# Patient Record
Sex: MALE | Race: WHITE | NOT HISPANIC OR LATINO | Employment: PART TIME | URBAN - METROPOLITAN AREA
[De-identification: names, ages, dates, MRNs, and addresses within clinical notes are randomized per-mention and may not be internally consistent; named-entity substitution may affect disease eponyms.]

---

## 2020-07-07 ENCOUNTER — HISTORICAL (OUTPATIENT)
Dept: LAB | Facility: HOSPITAL | Age: 55
End: 2020-07-07

## 2020-07-07 LAB — SARS-COV-2 RNA RESP QL NAA+PROBE: NOT DETECTED

## 2020-12-01 ENCOUNTER — HISTORICAL (OUTPATIENT)
Dept: ADMINISTRATIVE | Facility: HOSPITAL | Age: 55
End: 2020-12-01

## 2020-12-23 LAB
ABS NEUT (OLG): 4.56 X10(3)/MCL (ref 2.1–9.2)
BUN SERPL-MCNC: 22.6 MG/DL (ref 8.4–25.7)
CALCIUM SERPL-MCNC: 9.1 MG/DL (ref 8.4–10.2)
CHLORIDE SERPL-SCNC: 110 MMOL/L (ref 98–107)
CO2 SERPL-SCNC: 22 MMOL/L (ref 22–29)
CREAT SERPL-MCNC: 1.94 MG/DL (ref 0.72–1.25)
CREAT/UREA NIT SERPL: 12
ERYTHROCYTE [DISTWIDTH] IN BLOOD BY AUTOMATED COUNT: 13.6 % (ref 11.5–17)
GLUCOSE SERPL-MCNC: 118 MG/DL (ref 74–100)
HCT VFR BLD AUTO: 40 % (ref 42–52)
HGB BLD-MCNC: 12.5 GM/DL (ref 14–18)
MCH RBC QN AUTO: 26.3 PG (ref 27–31)
MCHC RBC AUTO-ENTMCNC: 31.3 GM/DL (ref 33–36)
MCV RBC AUTO: 84.2 FL (ref 80–94)
NRBC BLD AUTO-RTO: 0 % (ref 0–0.2)
PLATELET # BLD AUTO: 260 X10(3)/MCL (ref 130–400)
PMV BLD AUTO: 11.3 FL (ref 7.4–10.4)
POTASSIUM SERPL-SCNC: 3.8 MMOL/L (ref 3.5–5.1)
RBC # BLD AUTO: 4.75 X10(6)/MCL (ref 4.7–6.1)
SARS-COV-2 RNA RESP QL NAA+PROBE: NOT DETECTED
SODIUM SERPL-SCNC: 142 MMOL/L (ref 136–145)
WBC # SPEC AUTO: 6.7 X10(3)/MCL (ref 4.5–11.5)

## 2020-12-28 ENCOUNTER — HISTORICAL (OUTPATIENT)
Dept: SURGERY | Facility: HOSPITAL | Age: 55
End: 2020-12-28

## 2021-03-19 ENCOUNTER — HISTORICAL (OUTPATIENT)
Dept: RADIOLOGY | Facility: HOSPITAL | Age: 56
End: 2021-03-19

## 2021-03-19 LAB
ABS NEUT (OLG): 4.8 X10(3)/MCL (ref 2.1–9.2)
ALBUMIN SERPL-MCNC: 3.9 GM/DL (ref 3.5–5)
ALBUMIN/GLOB SERPL: 1.2 RATIO (ref 1.1–2)
ALP SERPL-CCNC: 74 UNIT/L (ref 40–150)
ALT SERPL-CCNC: 34 UNIT/L (ref 0–55)
APPEARANCE, UA: CLEAR
AST SERPL-CCNC: 37 UNIT/L (ref 5–34)
BACTERIA SPEC CULT: ABNORMAL /HPF
BILIRUB SERPL-MCNC: 0.5 MG/DL (ref 0.2–1.2)
BILIRUB UR QL STRIP: NEGATIVE
BILIRUBIN DIRECT+TOT PNL SERPL-MCNC: 0.2 MG/DL (ref 0–0.5)
BILIRUBIN DIRECT+TOT PNL SERPL-MCNC: 0.3 MG/DL (ref 0–0.8)
BUN SERPL-MCNC: 44.9 MG/DL (ref 8.4–25.7)
CALCIUM SERPL-MCNC: 9.8 MG/DL (ref 8.4–10.2)
CHLORIDE SERPL-SCNC: 106 MMOL/L (ref 98–107)
CO2 SERPL-SCNC: 25 MMOL/L (ref 22–29)
COLOR UR: YELLOW
CREAT SERPL-MCNC: 2.71 MG/DL (ref 0.72–1.25)
CREAT UR-MCNC: 56.2 MG/DL (ref 58–161)
ERYTHROCYTE [DISTWIDTH] IN BLOOD BY AUTOMATED COUNT: 15.3 % (ref 11.5–17)
GLOBULIN SER-MCNC: 3.3 GM/DL (ref 2.4–3.5)
GLUCOSE (UA): ABNORMAL
GLUCOSE SERPL-MCNC: 122 MG/DL (ref 74–100)
HBV SURFACE AG SERPL QL IA: NONREACTIVE
HCT VFR BLD AUTO: 40 % (ref 42–52)
HCV AB SERPL QL IA: REACTIVE
HGB BLD-MCNC: 12.6 GM/DL (ref 14–18)
HGB UR QL STRIP: NEGATIVE
KETONES UR QL STRIP: NEGATIVE
LEUKOCYTE ESTERASE UR QL STRIP: NEGATIVE
MCH RBC QN AUTO: 26.6 PG (ref 27–31)
MCHC RBC AUTO-ENTMCNC: 31.5 GM/DL (ref 33–36)
MCV RBC AUTO: 84.4 FL (ref 80–94)
NITRITE UR QL STRIP: NEGATIVE
NRBC BLD AUTO-RTO: 0 % (ref 0–0.2)
PH UR STRIP: 7 [PH] (ref 5–7)
PHOSPHATE SERPL-MCNC: 3.8 MG/DL (ref 2.3–4.7)
PLATELET # BLD AUTO: 287 X10(3)/MCL (ref 130–400)
PMV BLD AUTO: 10.1 FL (ref 7.4–10.4)
POTASSIUM SERPL-SCNC: 4.8 MMOL/L (ref 3.5–5.1)
PROT SERPL-MCNC: 6.8 GM/DL
PROT SERPL-MCNC: 7.2 GM/DL (ref 6.4–8.3)
PROT UR QL STRIP: ABNORMAL
PROT UR STRIP-MCNC: 420.9 MG/DL
PROT/CREAT UR-RTO: 7489.3 MG/GM CR
PTH-INTACT SERPL-MCNC: 66.6 PG/ML (ref 8.7–77.1)
RBC # BLD AUTO: 4.74 X10(6)/MCL (ref 4.7–6.1)
RBC #/AREA URNS HPF: 0 /[HPF]
SODIUM SERPL-SCNC: 138 MMOL/L (ref 136–145)
SP GR UR STRIP: 1.02 (ref 1–1.03)
SQUAMOUS EPITHELIAL, UA: ABNORMAL /LPF
UROBILINOGEN UR STRIP-ACNC: NEGATIVE
WBC # SPEC AUTO: 7 X10(3)/MCL (ref 4.5–11.5)
WBC #/AREA URNS HPF: ABNORMAL /HPF

## 2021-04-19 ENCOUNTER — HISTORICAL (OUTPATIENT)
Dept: ADMINISTRATIVE | Facility: HOSPITAL | Age: 56
End: 2021-04-19

## 2021-04-30 ENCOUNTER — HISTORICAL (OUTPATIENT)
Dept: RADIOLOGY | Facility: HOSPITAL | Age: 56
End: 2021-04-30

## 2021-07-08 ENCOUNTER — HISTORICAL (OUTPATIENT)
Dept: RADIOLOGY | Facility: HOSPITAL | Age: 56
End: 2021-07-08

## 2021-07-21 ENCOUNTER — HISTORICAL (OUTPATIENT)
Dept: ADMINISTRATIVE | Facility: HOSPITAL | Age: 56
End: 2021-07-21

## 2021-07-21 LAB
ABS NEUT (OLG): 4.89 X10(3)/MCL (ref 2.1–9.2)
ALBUMIN SERPL-MCNC: 2.9 GM/DL (ref 3.5–5)
ALBUMIN/GLOB SERPL: 0.9 RATIO (ref 1.1–2)
ALP SERPL-CCNC: 72 UNIT/L (ref 40–150)
ALT SERPL-CCNC: 40 UNIT/L (ref 0–55)
APPEARANCE, UA: CLEAR
AST SERPL-CCNC: 39 UNIT/L (ref 5–34)
BACTERIA SPEC CULT: ABNORMAL /HPF
BASOPHILS # BLD AUTO: 0.1 X10(3)/MCL (ref 0–0.2)
BASOPHILS NFR BLD AUTO: 1 %
BILIRUB SERPL-MCNC: 0.4 MG/DL
BILIRUB UR QL STRIP: NEGATIVE
BILIRUBIN DIRECT+TOT PNL SERPL-MCNC: 0.2 MG/DL (ref 0–0.5)
BILIRUBIN DIRECT+TOT PNL SERPL-MCNC: 0.2 MG/DL (ref 0–0.8)
BUN SERPL-MCNC: 18.8 MG/DL (ref 8.4–25.7)
CALCIUM SERPL-MCNC: 7.4 MG/DL (ref 8.4–10.2)
CHLORIDE SERPL-SCNC: 107 MMOL/L (ref 98–107)
CHOLEST SERPL-MCNC: 184 MG/DL
CHOLEST/HDLC SERPL: 5 {RATIO} (ref 0–5)
CO2 SERPL-SCNC: 24 MMOL/L (ref 22–29)
COLOR UR: YELLOW
CREAT SERPL-MCNC: 2.32 MG/DL (ref 0.73–1.18)
CREAT UR-MCNC: 24.9 MG/DL (ref 58–161)
EOSINOPHIL # BLD AUTO: 0.3 X10(3)/MCL (ref 0–0.9)
EOSINOPHIL NFR BLD AUTO: 4 %
ERYTHROCYTE [DISTWIDTH] IN BLOOD BY AUTOMATED COUNT: 13.3 % (ref 11.5–17)
GLOBULIN SER-MCNC: 3.4 GM/DL (ref 2.4–3.5)
GLUCOSE (UA): ABNORMAL
GLUCOSE SERPL-MCNC: 237 MG/DL (ref 74–100)
HCT VFR BLD AUTO: 38.4 % (ref 42–52)
HDLC SERPL-MCNC: 36 MG/DL (ref 35–60)
HGB BLD-MCNC: 12.2 GM/DL (ref 14–18)
HGB UR QL STRIP: ABNORMAL
KETONES UR QL STRIP: NEGATIVE
LDLC SERPL CALC-MCNC: 75 MG/DL (ref 50–140)
LEUKOCYTE ESTERASE UR QL STRIP: NEGATIVE
LYMPHOCYTES # BLD AUTO: 1.1 X10(3)/MCL (ref 0.6–4.6)
LYMPHOCYTES NFR BLD AUTO: 16 %
MCH RBC QN AUTO: 27.5 PG (ref 27–31)
MCHC RBC AUTO-ENTMCNC: 31.8 GM/DL (ref 33–36)
MCV RBC AUTO: 86.5 FL (ref 80–94)
MICROALBUMIN UR-MCNC: >500 UG/ML
MICROALBUMIN/CREAT RATIO PNL UR: >2008 MG/GM CR (ref 0–30)
MONOCYTES # BLD AUTO: 0.5 X10(3)/MCL (ref 0.1–1.3)
MONOCYTES NFR BLD AUTO: 7 %
NEUTROPHILS # BLD AUTO: 4.89 X10(3)/MCL (ref 2.1–9.2)
NEUTROPHILS NFR BLD AUTO: 71 %
NITRITE UR QL STRIP: NEGATIVE
PH UR STRIP: 7.5 [PH] (ref 5–9)
PLATELET # BLD AUTO: 250 X10(3)/MCL (ref 130–400)
PMV BLD AUTO: 11.5 FL (ref 9.4–12.4)
POTASSIUM SERPL-SCNC: 5.5 MMOL/L (ref 3.5–5.1)
PROT SERPL-MCNC: 6.3 GM/DL (ref 6.4–8.3)
PROT UR QL STRIP: ABNORMAL
RBC # BLD AUTO: 4.44 X10(6)/MCL (ref 4.7–6.1)
RBC #/AREA URNS HPF: ABNORMAL /[HPF]
SODIUM SERPL-SCNC: 143 MMOL/L (ref 136–145)
SP GR UR STRIP: 1.01 (ref 1–1.03)
SQUAMOUS EPITHELIAL, UA: ABNORMAL /HPF (ref 0–4)
TRIGL SERPL-MCNC: 364 MG/DL (ref 34–140)
UROBILINOGEN UR STRIP-ACNC: 0.2
VLDLC SERPL CALC-MCNC: 73 MG/DL
WBC # SPEC AUTO: 6.9 X10(3)/MCL (ref 4.5–11.5)
WBC #/AREA URNS HPF: ABNORMAL /HPF

## 2021-09-03 ENCOUNTER — HISTORICAL (OUTPATIENT)
Dept: LAB | Facility: HOSPITAL | Age: 56
End: 2021-09-03

## 2021-09-03 LAB
ABS NEUT (OLG): 4.25 X10(3)/MCL (ref 2.1–9.2)
ALBUMIN SERPL-MCNC: 3.2 GM/DL (ref 3.5–5)
ALBUMIN/GLOB SERPL: 0.9 RATIO (ref 1.1–2)
ALP SERPL-CCNC: 61 UNIT/L (ref 40–150)
ALT SERPL-CCNC: 39 UNIT/L (ref 0–55)
APPEARANCE, UA: CLEAR
AST SERPL-CCNC: 40 UNIT/L (ref 5–34)
BACTERIA SPEC CULT: ABNORMAL
BASOPHILS # BLD AUTO: 0.11 X10(3)/MCL (ref 0–0.2)
BASOPHILS NFR BLD AUTO: 1.6 % (ref 0–0.9)
BILIRUB SERPL-MCNC: 0.4 MG/DL (ref 0.2–1.2)
BILIRUB UR QL STRIP: NEGATIVE
BILIRUBIN DIRECT+TOT PNL SERPL-MCNC: 0.1 MG/DL (ref 0–0.5)
BILIRUBIN DIRECT+TOT PNL SERPL-MCNC: 0.3 MG/DL (ref 0–0.8)
BUN SERPL-MCNC: 20.1 MG/DL (ref 8.4–25.7)
CALCIUM SERPL-MCNC: 8.9 MG/DL (ref 8.4–10.2)
CHLORIDE SERPL-SCNC: 109 MMOL/L (ref 98–107)
CHOLEST SERPL-MCNC: 195 MG/DL
CHOLEST/HDLC SERPL: 6 {RATIO} (ref 0–5)
CO2 SERPL-SCNC: 24 MMOL/L (ref 22–29)
COLOR UR: YELLOW
CREAT SERPL-MCNC: 2.79 MG/DL (ref 0.72–1.25)
CREAT UR-MCNC: 58.8 MG/DL (ref 58–161)
EOSINOPHIL # BLD AUTO: 0.31 X10(3)/MCL (ref 0–0.9)
EOSINOPHIL NFR BLD AUTO: 4.6 % (ref 0–6.5)
ERYTHROCYTE [DISTWIDTH] IN BLOOD BY AUTOMATED COUNT: 13.8 % (ref 11.5–17)
EST. AVERAGE GLUCOSE BLD GHB EST-MCNC: 214.5 MG/DL
GLOBULIN SER-MCNC: 3.5 GM/DL (ref 2.4–3.5)
GLUCOSE (UA): ABNORMAL
GLUCOSE SERPL-MCNC: 84 MG/DL (ref 74–100)
HBA1C MFR BLD: 9.1 %
HCT VFR BLD AUTO: 38.4 % (ref 42–52)
HDLC SERPL-MCNC: 33 MG/DL (ref 40–60)
HGB BLD-MCNC: 12.3 GM/DL (ref 14–18)
HGB UR QL STRIP: ABNORMAL
IMM GRANULOCYTES # BLD AUTO: 0.04 10*3/UL (ref 0–0.02)
IMM GRANULOCYTES NFR BLD AUTO: 0.6 % (ref 0–0.43)
KETONES UR QL STRIP: NEGATIVE
LDLC SERPL CALC-MCNC: 107 MG/DL (ref 50–140)
LEUKOCYTE ESTERASE UR QL STRIP: NEGATIVE
LYMPHOCYTES # BLD AUTO: 1.57 X10(3)/MCL (ref 0.6–4.6)
LYMPHOCYTES NFR BLD AUTO: 23.1 % (ref 16.2–38.3)
MCH RBC QN AUTO: 28.1 PG (ref 27–31)
MCHC RBC AUTO-ENTMCNC: 32 GM/DL (ref 33–36)
MCV RBC AUTO: 87.7 FL (ref 80–94)
MONOCYTES # BLD AUTO: 0.51 X10(3)/MCL (ref 0.1–1.3)
MONOCYTES NFR BLD AUTO: 7.5 % (ref 4.7–11.3)
NEUTROPHILS # BLD AUTO: 4.25 X10(3)/MCL (ref 2.1–9.2)
NEUTROPHILS NFR BLD AUTO: 62.6 % (ref 49.1–73.4)
NITRITE UR QL STRIP: NEGATIVE
NRBC BLD AUTO-RTO: 0 % (ref 0–0.2)
PH UR STRIP: 7 [PH] (ref 5–7)
PHOSPHATE SERPL-MCNC: 3.6 MG/DL (ref 2.3–4.7)
PLATELET # BLD AUTO: 283 X10(3)/MCL (ref 130–400)
PMV BLD AUTO: 10.4 FL (ref 7.4–10.4)
POTASSIUM SERPL-SCNC: 4.2 MMOL/L (ref 3.5–5.1)
PROT SERPL-MCNC: 6.7 GM/DL (ref 6.4–8.3)
PROT UR QL STRIP: ABNORMAL
PROT UR STRIP-MCNC: 701.6 MG/DL
PSA SERPL-MCNC: 0.69 NG/ML
PTH-INTACT SERPL-MCNC: 133 PG/ML (ref 8.7–77.1)
RBC # BLD AUTO: 4.38 X10(6)/MCL (ref 4.7–6.1)
RBC #/AREA URNS HPF: ABNORMAL /HPF
SODIUM SERPL-SCNC: 142 MMOL/L (ref 136–145)
SP GR UR STRIP: 1.02 (ref 1–1.03)
SQUAMOUS EPITHELIAL, UA: ABNORMAL /LPF
TRIGL SERPL-MCNC: 276 MG/DL (ref 0–150)
UROBILINOGEN UR STRIP-ACNC: NEGATIVE
VLDLC SERPL CALC-MCNC: 55 MG/DL
WBC # SPEC AUTO: 6.8 X10(3)/MCL (ref 4.5–11.5)
WBC #/AREA URNS HPF: ABNORMAL /HPF

## 2021-11-09 ENCOUNTER — HISTORICAL (OUTPATIENT)
Dept: ADMINISTRATIVE | Facility: HOSPITAL | Age: 56
End: 2021-11-09

## 2021-11-09 LAB
ABS NEUT (OLG): 5.47 X10(3)/MCL (ref 2.1–9.2)
BASOPHILS # BLD AUTO: 0.1 X10(3)/MCL (ref 0–0.2)
BASOPHILS NFR BLD AUTO: 1 %
BUN SERPL-MCNC: 41.3 MG/DL (ref 8.4–25.7)
CALCIUM SERPL-MCNC: 9.5 MG/DL (ref 8.7–10.5)
CHLORIDE SERPL-SCNC: 101 MMOL/L (ref 98–107)
CO2 SERPL-SCNC: 23 MMOL/L (ref 22–29)
CREAT SERPL-MCNC: 3.91 MG/DL (ref 0.73–1.18)
CREAT/UREA NIT SERPL: 11
EOSINOPHIL # BLD AUTO: 0.3 X10(3)/MCL (ref 0–0.9)
EOSINOPHIL NFR BLD AUTO: 4 %
ERYTHROCYTE [DISTWIDTH] IN BLOOD BY AUTOMATED COUNT: 12.2 % (ref 11.5–17)
GLUCOSE SERPL-MCNC: 407 MG/DL (ref 74–100)
HCT VFR BLD AUTO: 38.3 % (ref 42–52)
HGB BLD-MCNC: 12.7 GM/DL (ref 14–18)
LYMPHOCYTES # BLD AUTO: 1.6 X10(3)/MCL (ref 0.6–4.6)
LYMPHOCYTES NFR BLD AUTO: 20 %
MCH RBC QN AUTO: 28.7 PG (ref 27–31)
MCHC RBC AUTO-ENTMCNC: 33.2 GM/DL (ref 33–36)
MCV RBC AUTO: 86.7 FL (ref 80–94)
MONOCYTES # BLD AUTO: 0.7 X10(3)/MCL (ref 0.1–1.3)
MONOCYTES NFR BLD AUTO: 8 %
NEUTROPHILS # BLD AUTO: 5.47 X10(3)/MCL (ref 2.1–9.2)
NEUTROPHILS NFR BLD AUTO: 66 %
PLATELET # BLD AUTO: 304 X10(3)/MCL (ref 130–400)
PMV BLD AUTO: 11.5 FL (ref 9.4–12.4)
POTASSIUM SERPL-SCNC: 5.6 MMOL/L (ref 3.5–5.1)
RBC # BLD AUTO: 4.42 X10(6)/MCL (ref 4.7–6.1)
SODIUM SERPL-SCNC: 134 MMOL/L (ref 136–145)
WBC # SPEC AUTO: 8.3 X10(3)/MCL (ref 4.5–11.5)

## 2022-01-04 ENCOUNTER — HISTORICAL (OUTPATIENT)
Dept: RADIOLOGY | Facility: HOSPITAL | Age: 57
End: 2022-01-04

## 2022-01-20 ENCOUNTER — HISTORICAL (OUTPATIENT)
Dept: RADIOLOGY | Facility: HOSPITAL | Age: 57
End: 2022-01-20

## 2022-02-25 ENCOUNTER — HISTORICAL (OUTPATIENT)
Dept: ADMINISTRATIVE | Facility: HOSPITAL | Age: 57
End: 2022-02-25

## 2022-02-25 LAB — DEPRECATED CALCIDIOL+CALCIFEROL SERPL-MC: 32.3 NG/ML (ref 30–80)

## 2022-03-21 ENCOUNTER — HISTORICAL (OUTPATIENT)
Dept: ADMINISTRATIVE | Facility: HOSPITAL | Age: 57
End: 2022-03-21

## 2022-03-21 LAB
ABS NEUT (OLG): 6.85 (ref 2.1–9.2)
BASOPHILS # BLD AUTO: 0.1 10*3/UL (ref 0–0.2)
BASOPHILS NFR BLD AUTO: 1 %
EOSINOPHIL # BLD AUTO: 0.4 10*3/UL (ref 0–0.9)
EOSINOPHIL NFR BLD AUTO: 4 %
ERYTHROCYTE [DISTWIDTH] IN BLOOD BY AUTOMATED COUNT: 17.7 % (ref 11.5–17)
HCT VFR BLD AUTO: 27.7 % (ref 42–52)
HGB BLD-MCNC: 8.8 G/DL (ref 14–18)
LYMPHOCYTES # BLD AUTO: 1 10*3/UL (ref 0.6–4.6)
LYMPHOCYTES NFR BLD AUTO: 11 %
MANUAL DIFF? (OHS): NO
MCH RBC QN AUTO: 27.4 PG (ref 27–31)
MCHC RBC AUTO-ENTMCNC: 31.8 G/DL (ref 33–36)
MCV RBC AUTO: 86.3 FL (ref 80–94)
MONOCYTES # BLD AUTO: 0.8 10*3/UL (ref 0.1–1.3)
MONOCYTES NFR BLD AUTO: 9 %
NEUTROPHILS # BLD AUTO: 6.85 10*3/UL (ref 2.1–9.2)
NEUTROPHILS NFR BLD AUTO: 75 %
PLATELET # BLD AUTO: 342 10*3/UL (ref 130–400)
PMV BLD AUTO: 10.6 FL (ref 9.4–12.4)
RBC # BLD AUTO: 3.21 10*6/UL (ref 4.7–6.1)
WBC # SPEC AUTO: 9.2 10*3/UL (ref 4.5–11.5)

## 2022-04-10 ENCOUNTER — HISTORICAL (OUTPATIENT)
Dept: ADMINISTRATIVE | Facility: HOSPITAL | Age: 57
End: 2022-04-10
Payer: COMMERCIAL

## 2022-04-26 ENCOUNTER — HISTORICAL (OUTPATIENT)
Dept: ADMINISTRATIVE | Facility: HOSPITAL | Age: 57
End: 2022-04-26
Payer: COMMERCIAL

## 2022-04-26 VITALS
WEIGHT: 241.63 LBS | SYSTOLIC BLOOD PRESSURE: 140 MMHG | OXYGEN SATURATION: 97 % | HEIGHT: 69 IN | BODY MASS INDEX: 35.79 KG/M2 | DIASTOLIC BLOOD PRESSURE: 62 MMHG

## 2022-04-26 DIAGNOSIS — D64.9 ANEMIA, UNSPECIFIED TYPE: ICD-10-CM

## 2022-04-26 DIAGNOSIS — N18.4 CKD (CHRONIC KIDNEY DISEASE) STAGE 4, GFR 15-29 ML/MIN: Primary | ICD-10-CM

## 2022-04-26 DIAGNOSIS — I10 HYPERTENSION, UNSPECIFIED TYPE: ICD-10-CM

## 2022-04-26 LAB
FERRITIN SERPL-MCNC: 47.7 NG/ML (ref 21.81–274.66)
FOLATE SERPL-MCNC: 16.8 NG/ML (ref 7–31.4)
IRON SATN MFR SERPL: 48 % (ref 20–50)
IRON SERPL-MCNC: 170 UG/DL (ref 65–175)
TIBC SERPL-MCNC: 183 UG/DL (ref 69–240)
TIBC SERPL-MCNC: 353 UG/DL (ref 250–450)
TRANSFERRIN SERPL-MCNC: 321 MG/DL (ref 174–364)
VIT B12 SERPL-MCNC: 375 PG/ML (ref 213–816)

## 2022-04-28 ENCOUNTER — HISTORICAL (OUTPATIENT)
Dept: INFUSION THERAPY | Facility: HOSPITAL | Age: 57
End: 2022-04-28
Payer: COMMERCIAL

## 2022-05-03 NOTE — HISTORICAL [facility] CERNER
This is a historical note converted from [employer]. Formatting and pictures may have been removed.  Please reference [employer] for original formatting and attached multimedia. Chief Complaint  Pt here for bilateral hand pain x 6 months. Pt states having some numbness, itching, and tingling in hands. He reports having carpal tunnel sx in both hands about 20 years ago....NG  History of Present Illness  This is a patient that present today with wrist and hand pain. Patient also complains of numbness and tingling. Patient has been dealing with this pain for several weeks. The pain is moderate to severe. ?He did treat himself with wrist braces?and exercises.? He had?carpal tunnel surgery done 20+ years ago.  Review of Systems  All review of systems negative except for those stated in the HPI  Physical Exam  Vitals & Measurements  T:?98.1? ?F (Oral)? HR:?72(Peripheral)? BP:?157/70?  HT:?176.00?cm? WT:?107.950?kg? BMI:?34.85?  General: Well-developed, well-nourished.  Neuro: Alert and oriented x 3.  Psych: Normal mood and affect.  CV: Palpable radial pulses.  Resp: Smooth and unlabored.  Skin: No evidence of focal lesions or trauma.  Hem/Imm/Lymph: No evidence of lymphangitis or adenopathy.  Gait: No trendelenburg gait.  DTR: 2+, no hypo or hyperreflexia.  Coordination and Balance: No tremors or abnormal station.?  Bilateral?wrist Exam:  No obvious deformity. Negative tenderness over distal radius. Supination and pronation to 90 degrees and 90 degrees, respectively. Wrist flexion to 90 degrees and wrist extension to 70 degree. ?Positive flexion-compression test and Phanel?s test. Negative Finkelstein?s test. 4/5 strength, normal skin appearance and palpable pulses and CR<2.  Assessment/Plan  1.?Bilateral carpal tunnel syndrome?G56.03  ?We will send patient to neurologist to get a EMG/NCS test of both upper extremities.? I also placed him on gabapentin.  Ordered:  gabapentin, 300 mg = 1 cap(s), Oral, BID, # 60 tab(s), 0  Refill(s), Pharmacy: [id number], 176, cm, Height/Length Dosing, 12/01/20 12:56:00 CST, 107.95, kg, Weight Dosing, 12/01/20 12:56:00 CST  Office/Outpatient Visit Level 3 [facility] [id number] PC, Bilateral carpal tunnel syndrome, [facility], 12/01/20 13:01:00 CST  ?   Problem List/Past Medical History  Ongoing  Acid reflux  Cigarette smoker  Diabetes mellitus type 2  High cholesterol  Hypertension  Recurrent sinus infections  Historical  CVA (cerebral vascular accident)  Degenerative disc disease, lumbar  Diabetes mellitus  GERD (gastroesophageal reflux disease)  H/O: TIA  Lumbar stenosis  Numbness and tingling  Pain in back  Sleep apnea  Tinnitus  Procedure/Surgical History  Excision of intervertebral disc (04/16/2015)  Lumbar Laminectomy (.) (04/16/2015)  Other exploration and decompression of spinal canal (04/16/2015)  Ablation, soft tissue of inferior turbinates, unilateral or bilateral, any method (eg, electrocautery, radiofrequency ablation, or tissue volume reduction); intramural (ie, submucosal). (07/18/2012)  Adenoidectomy without tonsillectomy (07/18/2012)  Adenoidectomy, primary; age 12 or over. (07/18/2012)  Fracture nasal inferior turbinate(s), therapeutic. (07/18/2012)  Fracture of the turbinates (07/18/2012)  Nasal/sinus endoscopy, surgical; with ian bullosa resection. (07/18/2012)  Nasal/sinus endoscopy, surgical; with dilation of frontal sinus ostium (eg, balloon dilation). (07/18/2012)  Nasal/sinus endoscopy, surgical; with dilation of maxillary sinus ostium (eg, balloon dilation), transnasal or via canine fossa. (07/18/2012)  Other operations on nasal sinuses (07/18/2012)  Other operations on nasal sinuses (07/18/2012)  Other septoplasty (07/18/2012)  Other turbinectomy (07/18/2012)  Septoplasty or submucous resection, with or without cartilage scoring, contouring or replacement with graft. (07/18/2012)  Turbinectomy by diathermy or cryosurgery (07/18/2012)  right elbow surgery  (01/01/2012)  Myringotomy and insertion of long-term ventilation tube (07/20/2011)  Carpal tunnel release  FESS - Functional endoscopic sinus surgery  Repair of inguinal hernia  Vasectomy   Medications  Actos 30 mg oral tablet, Daily,? ?Not taking: Last Dose Date/Time Unknown  amlodipine 10 mg oral tablet, 10 mg= 1 tab(s), Oral, Daily,? ?Not taking: Last Dose Date/Time Unknown  aspirin 81 mg oral Delayed Release (EC) tablet, 81 mg= 1 tab(s), Oral, Daily  Centrum Silver Ultra Mens oral tablet, Oral, Daily  clopidogrel 75 mg oral tablet, 75 mg= 1 tab(s), Oral, Daily  Decara 50,000 intl units (1250 mcg) oral capsule, 19054 IntUnit= 1 cap(s), Oral, qWeek  fenofibrate 145 mg oral tablet, 145 mg= 1 tab(s), Oral, Daily  glimepiride 4 mg oral tablet, 4 mg= 1 tab(s), Oral, BID  Glucophage 850 mg oral tablet, BID,? ?Not taking: Last Dose Date/Time Unknown  hydrALAZINE 50 mg oral tablet, 50 mg= 1 tab(s), Oral, QID  Invokana 300 mg oral tablet, 300 mg= 1 tab(s), Oral, Daily  labetalol 100 mg oral tablet, 100 mg= 1 tab(s), Oral, BID  Lantus 100 units/mL subcutaneous inj., 20 units, Subcutaneous, At Bedtime  Lasix 20 mg oral tablet, 20 mg= 1 tab(s), Oral, Daily,? ?Not taking: Last Dose Date/Time Unknown  lisinopril 40 mg oral tablet, 40 mg= 1 tab(s), Oral, BID,? ?Not taking: Last Dose Date/Time Unknown  Pantoprazole 40 mg ORAL EC-Tablet, 40 mg= 1 tab(s), Oral, Daily  Protonix 40 mg oral granule, 1 tab(s), Oral, Daily  rosuvastatin 20 mg oral tablet, 20 mg= 1 tab(s), Oral, Daily  Stiolto Respimat 60 ACT 2.5 mcg-2.5 mcg/inh inhalation aerosol, 2 puff(s), INH, q24hr  telmisartan 80 mg oral tablet, 80 mg= 1 tab(s), Oral, Daily  Vascepa 1 g oral capsule, 2 gm= 2 cap(s), Oral, BID  Victoza 18 mg/3 mL subcutaneous injection, 1.8, Subcutaneous  Xyosted 100 mg/0.5 mL subcutaneous solution, 100 mg, Subcutaneous, qWeek  Allergies  No Known Allergies  Social History  Abuse/Neglect  No, 12/01/2020  Alcohol - Low Risk,  02/12/2015  Employment/School  Highest education level: University degree(s)., 07/14/2012  Home/Environment  Lives with Significant other. Home equipment: Glucose monitoring., 07/14/2012  Nutrition/Health  Diabetic, 07/14/2012  Substance Use - Denies Substance Abuse, 07/14/2012  Tobacco  Former smoker, quit more than 30 days ago, N/A, 12/01/2020  Family History  Family history is unknown  Immunizations  Vaccine Date Status   pneumococcal 23-polyvalent vaccine 02/13/2015 Given   Health Maintenance  Health Maintenance  ???Pending?(in the next year)  ??? ??OverDue  ??? ? ? ?Diabetes Maintenance-Fasting Lipid Profile due??02/12/16??and every 1??year(s)  ??? ? ? ?Diabetes Maintenance-Serum Creatinine due??02/14/16??and every 1??year(s)  ??? ??Due?  ??? ? ? ?ADL Screening due??12/01/20??and every 1??year(s)  ??? ? ? ?Hypertension Management-Education due??12/01/20??and every 1??year(s)  ??? ? ? ?Tetanus Vaccine due??12/01/20??and every 10??year(s)  ??? ??Due In Future?  ??? ? ? ?Obesity Screening not due until??01/01/21??and every 1??year(s)  ??? ? ? ?Alcohol Misuse Screening not due until??01/02/21??and every 1??year(s)  ???Satisfied?(in the past 1 year)  ??? ??Satisfied?  ??? ? ? ?Alcohol Misuse Screening on??12/01/20.??Satisfied by Rozina Lester  ??? ? ? ?Aspirin Therapy for CVD Prevention on??12/01/20.  ??? ? ? ?Blood Pressure Screening on??12/01/20.??Satisfied by Rozina Lester  ??? ? ? ?Body Mass Index Check on??12/01/20.??Satisfied by Rozina Lester  ??? ? ? ?Hypertension Management-Blood Pressure on??12/01/20.??Satisfied by Rozina Lester  ??? ? ? ?Obesity Screening on??12/01/20.??Satisfied by Roznia Lester  ?
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
## 2022-05-03 NOTE — HISTORICAL [facility] CERNER
This is a historical note converted from Hitesh. Formatting and pictures may have been removed.  Please reference Hitesh for original formatting and attached multimedia. OPERATIVE REPORT  ?  DATE: 12/28/2020  ?  ASSISTANT: romina Shay assistant  ?  PREOPERATIVE DIAGNOSIS:  1. ?Bilateral carpal tunnel syndrome  2. ?Left cubital tunnel syndrome  ?  POSTOPERATIVE DIAGNOSIS:  Same  ?  PROCEDURES:  1. ?Bilateral carpal tunnel release  2. ?Left cubital tunnel release  ?  ANESTHESIA:  General  ?  BLOOD LOSS:  None  ?  DVT PROPHYLAXIS:  None indicated  ?  INSTRUMENTATION:  None  ?  PROCEDURE IN DETAIL:  ?  Bilateral?carpal Tunnel Release  ?  The patient was brought to the operating room, placed on the table in supine position. Their upper extremity was prepped and draped in a normal sterile fashion. A time-out was performed to confirm the operative procedure, the patient, the allergies, and if antibiotics were given. ?  ?  A longitudinal 2cm incision was made just ulnar to the palmar crease. Carefully dissection was done towards the transverse carpal ligament. Then the transverse carpal ligament was longitudinally incised with a 15 blade. The tunnel was explored and there were no cyst or other lesions noted. The wound was thoroughly irrigated and closed with 4-0 nylon. Patient?s upper extremity was placed in a sterile dressing.?  ?  This was performed on the?right upper extremity first and then?the?same above-noted procedure?was done?on the left side afterwards.  ?  Left?cubital Tunnel Release  ?  A longitudinal 3cm incision was made between the olecranon process and medial epicondyle. Careful dissection was done until the ulnar nerve was seen. Then a vessel loop was placed around the nerve. The nerve was gently retracted out of the wound to release all adhesions with tenotomy scissors. The wound was thoroughly irrigated and closed with 2-0 vicryl and 4-0 nylon. Patient?s upper extremity was placed in a  sterile dressing.

## 2022-05-10 RX ORDER — TERAZOSIN 2 MG/1
2 CAPSULE ORAL 2 TIMES DAILY
COMMUNITY
Start: 2021-04-19

## 2022-05-10 RX ORDER — GLIMEPIRIDE 4 MG/1
4 TABLET ORAL 2 TIMES DAILY
COMMUNITY
Start: 2022-04-18 | End: 2023-07-24

## 2022-05-10 RX ORDER — TORSEMIDE 20 MG/1
40 TABLET ORAL DAILY
COMMUNITY
Start: 2022-01-25 | End: 2022-08-11 | Stop reason: SDUPTHER

## 2022-05-10 RX ORDER — NIFEDIPINE 60 MG/1
60 TABLET, EXTENDED RELEASE ORAL 2 TIMES DAILY
COMMUNITY
Start: 2021-12-07 | End: 2022-06-06

## 2022-05-10 RX ORDER — ICOSAPENT ETHYL 1000 MG/1
2 CAPSULE ORAL 2 TIMES DAILY
COMMUNITY
Start: 2022-02-01

## 2022-05-10 RX ORDER — CLOPIDOGREL BISULFATE 75 MG/1
75 TABLET ORAL DAILY
COMMUNITY
Start: 2022-01-25

## 2022-05-10 RX ORDER — HYDRALAZINE HYDROCHLORIDE 25 MG/1
25 TABLET, FILM COATED ORAL 3 TIMES DAILY
COMMUNITY
Start: 2021-11-23 | End: 2022-05-25

## 2022-05-10 RX ORDER — TELMISARTAN 80 MG/1
80 TABLET ORAL DAILY
COMMUNITY
Start: 2022-04-25 | End: 2022-05-25

## 2022-05-10 RX ORDER — TESTOSTERONE ENANTHATE 100 MG/.5ML
INJECTION SUBCUTANEOUS WEEKLY
COMMUNITY
Start: 2022-02-15 | End: 2022-06-06

## 2022-05-10 RX ORDER — INSULIN GLARGINE 100 [IU]/ML
50 INJECTION, SOLUTION SUBCUTANEOUS DAILY
COMMUNITY
Start: 2021-09-07 | End: 2022-07-20 | Stop reason: ALTCHOICE

## 2022-05-10 RX ORDER — DULAGLUTIDE 1.5 MG/.5ML
INJECTION, SOLUTION SUBCUTANEOUS WEEKLY
COMMUNITY
Start: 2022-02-01 | End: 2023-07-24

## 2022-05-10 RX ORDER — ESCITALOPRAM OXALATE 10 MG/1
10 TABLET ORAL DAILY
COMMUNITY
Start: 2022-04-08

## 2022-05-10 RX ORDER — DAPAGLIFLOZIN 10 MG/1
10 TABLET, FILM COATED ORAL DAILY
Status: ON HOLD | COMMUNITY
Start: 2022-04-11 | End: 2022-09-16 | Stop reason: HOSPADM

## 2022-05-10 RX ORDER — LABETALOL 200 MG/1
300 TABLET, FILM COATED ORAL 2 TIMES DAILY
COMMUNITY
Start: 2022-04-14

## 2022-05-10 RX ORDER — ROSUVASTATIN CALCIUM 20 MG/1
20 TABLET, FILM COATED ORAL DAILY
COMMUNITY
Start: 2022-02-01 | End: 2022-06-06

## 2022-05-10 RX ORDER — PANTOPRAZOLE SODIUM 40 MG/1
40 TABLET, DELAYED RELEASE ORAL DAILY
COMMUNITY
Start: 2022-04-13

## 2022-05-10 RX ORDER — HYDROCODONE BITARTRATE AND ACETAMINOPHEN 10; 325 MG/1; MG/1
1 TABLET ORAL EVERY 6 HOURS PRN
COMMUNITY
Start: 2021-11-27 | End: 2022-05-25

## 2022-05-10 RX ORDER — ASPIRIN 325 MG
1250 TABLET, DELAYED RELEASE (ENTERIC COATED) ORAL WEEKLY
Status: ON HOLD | COMMUNITY
Start: 2021-09-08 | End: 2022-09-16 | Stop reason: HOSPADM

## 2022-05-10 RX ORDER — FENOFIBRATE 145 MG/1
145 TABLET, FILM COATED ORAL DAILY
COMMUNITY
Start: 2022-03-16

## 2022-05-16 ENCOUNTER — HOSPITAL ENCOUNTER (OUTPATIENT)
Dept: RADIOLOGY | Facility: HOSPITAL | Age: 57
Discharge: HOME | End: 2022-05-16
Attending: INTERNAL MEDICINE
Payer: COMMERCIAL

## 2022-05-16 ENCOUNTER — OFFICE VISIT (OUTPATIENT)
Dept: NEPHROLOGY | Facility: CLINIC | Age: 57
End: 2022-05-16
Payer: COMMERCIAL

## 2022-05-16 VITALS
HEIGHT: 69 IN | DIASTOLIC BLOOD PRESSURE: 70 MMHG | TEMPERATURE: 98 F | RESPIRATION RATE: 20 BRPM | HEART RATE: 66 BPM | BODY MASS INDEX: 34.22 KG/M2 | SYSTOLIC BLOOD PRESSURE: 132 MMHG | WEIGHT: 231.06 LBS | OXYGEN SATURATION: 95 %

## 2022-05-16 DIAGNOSIS — I10 HYPERTENSION, UNSPECIFIED TYPE: ICD-10-CM

## 2022-05-16 DIAGNOSIS — D63.1 ANEMIA IN CHRONIC KIDNEY DISEASE, UNSPECIFIED CKD STAGE: ICD-10-CM

## 2022-05-16 DIAGNOSIS — N18.9 ANEMIA IN CHRONIC KIDNEY DISEASE, UNSPECIFIED CKD STAGE: ICD-10-CM

## 2022-05-16 DIAGNOSIS — E11.21 TYPE 2 DIABETES MELLITUS WITH DIABETIC NEPHROPATHY, UNSPECIFIED WHETHER LONG TERM INSULIN USE: ICD-10-CM

## 2022-05-16 DIAGNOSIS — R06.02 SHORTNESS OF BREATH: Primary | ICD-10-CM

## 2022-05-16 DIAGNOSIS — N18.4 CHRONIC KIDNEY DISEASE, STAGE IV (SEVERE): ICD-10-CM

## 2022-05-16 DIAGNOSIS — N18.4 CKD (CHRONIC KIDNEY DISEASE) STAGE 4, GFR 15-29 ML/MIN: Primary | ICD-10-CM

## 2022-05-16 DIAGNOSIS — R06.02 SHORTNESS OF BREATH: ICD-10-CM

## 2022-05-16 PROCEDURE — 99999 PR PBB SHADOW E&M-EST. PATIENT-LVL V: ICD-10-PCS | Mod: PBBFAC,,, | Performed by: INTERNAL MEDICINE

## 2022-05-16 PROCEDURE — 99214 PR OFFICE/OUTPT VISIT, EST, LEVL IV, 30-39 MIN: ICD-10-PCS | Mod: S$GLB,,, | Performed by: INTERNAL MEDICINE

## 2022-05-16 PROCEDURE — 99999 PR PBB SHADOW E&M-EST. PATIENT-LVL V: CPT | Mod: PBBFAC,,, | Performed by: INTERNAL MEDICINE

## 2022-05-16 PROCEDURE — 99214 OFFICE O/P EST MOD 30 MIN: CPT | Mod: S$GLB,,, | Performed by: INTERNAL MEDICINE

## 2022-05-16 PROCEDURE — 71046 X-RAY EXAM CHEST 2 VIEWS: CPT | Mod: TC

## 2022-05-16 RX ORDER — ASCORBIC ACID 500 MG
500 TABLET ORAL DAILY
COMMUNITY
End: 2023-07-24

## 2022-05-16 RX ORDER — ACETAMINOPHEN 500 MG
5000 TABLET ORAL DAILY
COMMUNITY
End: 2022-06-06

## 2022-05-16 NOTE — PROGRESS NOTES
"[facility] Ambulatory Progress Note      HPI   Nicholas Vera is a 56 y.o. male here for a follow up visit for CKD 4 related to diabetic nephropathy and nephrosclerosis. His renal indices vary based on fluid status (cardiorenal component).  He is complaining of worsening SOB and BLLE swelling. He has CXR scheduled for today order by PCP. He is not coughing. He denies hx of blood clots. He does complain of some epigastric CP when he is "run down" or overexerts himself. He is seeing cardiologist tomorrow.  He reports receiving 2 units PRBC 2 weeks ago after our last visit. H&H stable as of 5/12/22 (10&33)      Medical History:   Past Medical History:   Diagnosis Date    Acid reflux     Anxiety     Atrial fibrillation     CAD (coronary artery disease)     Carpal tunnel syndrome     Cervical radiculopathy     CVA (cerebral vascular accident)     GERD (gastroesophageal reflux disease)     High cholesterol     HTN (hypertension)     Sleep apnea     TIA (transient ischemic attack)     Type 2 diabetes mellitus        Surgical History:   Past Surgical History:   Procedure Laterality Date    CARPAL TUNNEL RELEASE Bilateral     CERVICAL FUSION      COLONOSCOPY      CORONARY ANGIOPLASTY WITH STENT PLACEMENT      ELBOW SURGERY Right     ELBOW SURGERY Left     EXCISION OF EXTERNAL EAR      FUNCTIONAL ENDOSCOPIC SINUS SURGERY (FESS)      INGUINAL HERNIA REPAIR      LUMBAR LAMINECTOMY      MYRINGOTOMY W/ TUBES      NASAL SEPTOPLASTY      NEUROPLASTY      SINUS ENDOSCOPY      TONSILLECTOMY, ADENOIDECTOMY      VASECTOMY         Family History:   History reviewed. No pertinent family history..     Social History:   Social History     Tobacco Use    Smoking status: Former Smoker    Smokeless tobacco: Never Used   Substance Use Topics    Alcohol use: Never       Allergies:  Review of patient's allergies indicates:  No Known Allergies    Medications:    Current Outpatient Medications:     ascorbic " acid, vitamin C, (VITAMIN C) 500 MG tablet, Take 500 mg by mouth once daily., Disp: , Rfl:     cholecalciferol, vitamin D3, (VITAMIN D3) 125 mcg (5,000 unit) Tab, Take 5,000 Units by mouth once daily., Disp: , Rfl:     cholecalciferol, vitamin D3, 1,250 mcg (50,000 unit) capsule, Take 1,250 mcg by mouth once a week., Disp: , Rfl:     clopidogreL (PLAVIX) 75 mg tablet, Take 75 mg by mouth once daily at 6am., Disp: , Rfl:     CRESTOR 20 mg tablet, Take 20 mg by mouth once daily at 6am., Disp: , Rfl:     EScitalopram oxalate (LEXAPRO) 10 MG tablet, Take 10 mg by mouth once daily at 6am., Disp: , Rfl:     FARXIGA 10 mg tablet, Take 10 mg by mouth once daily at 6am., Disp: , Rfl:     fenofibrate (TRICOR) 145 MG tablet, Take 145 mg by mouth once daily at 6am., Disp: , Rfl:     ferrous sulfate/ascorbic acid (MOL-IRON WITH VITAMIN C ORAL), Take 2 capsules by mouth once daily at 6am., Disp: , Rfl:     glimepiride (AMARYL) 4 MG tablet, Take 4 mg by mouth 2 (two) times a day., Disp: , Rfl:     hydrALAZINE (APRESOLINE) 25 MG tablet, Take 25 mg by mouth 3 (three) times daily., Disp: , Rfl:     insulin glargine (LANTUS U-100 INSULIN) 100 unit/mL injection, Inject 50 Units into the skin once daily at 6am., Disp: , Rfl:     labetaloL (NORMODYNE) 200 MG tablet, Take 200 mg by mouth 2 (two) times a day., Disp: , Rfl:     multivit-mins/iron/folic/lycop (CENTRUM MEN ORAL), Take 1 tablet by mouth once daily at 6am., Disp: , Rfl:     NIFEdipine (ADALAT CC) 60 MG TbSR, Take 60 mg by mouth 2 (two) times a day., Disp: , Rfl:     pantoprazole (PROTONIX) 40 MG tablet, Take 40 mg by mouth once daily at 6am., Disp: , Rfl:     terazosin (HYTRIN) 2 MG capsule, Take 2 mg by mouth 2 (two) times a day., Disp: , Rfl:     torsemide (DEMADEX) 20 MG Tab, Take 40 mg by mouth once daily at 6am., Disp: , Rfl:     TRULICITY 1.5 mg/0.5 mL pen injector, once a week., Disp: , Rfl:     VASCEPA 1 gram Cap, Take 2 tablets by mouth 2 (two) times  "a day., Disp: , Rfl:     XYOSTED 100 mg/0.5 mL AtIn, once a week., Disp: , Rfl:     HYDROcodone-acetaminophen (NORCO)  mg per tablet, Take 1 tablet by mouth every 6 (six) hours as needed., Disp: , Rfl:     telmisartan (MICARDIS) 80 MG Tab, Take 80 mg by mouth once daily at 6am., Disp: , Rfl:        ROS:    Constitutional: No fever, fatigue or weight loss  Skin: No wounds, no rashes; no itching  EENT: no acute visual changes, tinnitus, congestion, or sore throat  Respiratory:  No cough or wheezing; +increase SOB with very little ambulation  Cardiovascular: No chest pain, palpitations, or swelling  Gastrointestional: No abdominal pain, n/v/d/c  Genitourinary: no dysuria, hematuria, or incontinence; no foamy or foul-smelling urine; able to empty bladder  Musculoskeletal: no joint pain, myalgias, back pain, or focal weakness  Neurological: No headaches, seizures, numbness, tingling, or weakness  Hematological: No unusual bruising or bleeding  Psychiatric: No psychiatric concerns, hallucinations, or depression; no confusion      Vital Signs:  /70 (BP Location: Left arm, Patient Position: Sitting)   Pulse 66   Temp 98.4 °F (36.9 °C) (Oral)   Resp 20   Ht 5' 9" (1.753 m)   Wt 104.8 kg (231 lb 0.7 oz)   SpO2 95%   BMI 34.12 kg/m²   Body mass index is 34.12 kg/m².      Physical Exam:    General: no acute distress, awake, alert, well-nourished  Eyes: PERRLA, EOMI, conjunctiva clear with no exudate, mild periorbital edema  HENT: atraumatic, oropharynx and nasal mucosa patent without drainage  Neck: full ROM, no JVD, no thyromegaly or lymphadenopathy  Respiratory: equal, unlabored, fine basilar rales bilaterally  Cardiovascular: RRR without  rub; radial and pedal pulses intact  Edema: trace  Gastrointestinal: soft, non-tender, non-distended; positive bowel sounds; no masses to palpation  Genitourinary: no CVA tenderness upon palpation  Musculoskeletal: full ROM of all extremities and spine without " limitation or discomfort  Integumentary: warm, dry; no rashes or skin lesions  Neurological: oriented x 4, appropriate, no acute deficits      Labs:    5/12/22:  Cr: 3.8  H&H 10/33      Impression:    1. CKD (chronic kidney disease) stage 4, GFR 15-29 ml/min     2. Hypertension, unspecified type     3. Anemia in chronic kidney disease, unspecified CKD stage     4. Type 2 diabetes mellitus with diabetic nephropathy, unspecified whether long term insulin use       Cr down from 4 to 3.8 after changing meds last visit (trinidad'd micardis)    Plan:  Discussed future possibility of dialysis (HD and PD) and obtaining access (fistula) to have ready for dialysis when it becomes necessary   Long discussion patient and family about different dialysis options and therapies  They are thinking and weighing options...  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

## 2022-05-17 DIAGNOSIS — D64.9 ANEMIA, UNSPECIFIED: Primary | ICD-10-CM

## 2022-05-17 RX ORDER — ISOSORBIDE MONONITRATE 30 MG/1
30 TABLET, EXTENDED RELEASE ORAL DAILY
Qty: 90 TABLET | Refills: 0 | Status: SHIPPED | OUTPATIENT
Start: 2022-05-17 | End: 2022-07-19

## 2022-05-19 DIAGNOSIS — D63.1 ANEMIA IN CHRONIC KIDNEY DISEASE, UNSPECIFIED CKD STAGE: ICD-10-CM

## 2022-05-19 DIAGNOSIS — N18.9 ANEMIA IN CHRONIC KIDNEY DISEASE, UNSPECIFIED CKD STAGE: ICD-10-CM

## 2022-05-19 DIAGNOSIS — N18.4 CKD (CHRONIC KIDNEY DISEASE) STAGE 4, GFR 15-29 ML/MIN: Primary | ICD-10-CM

## 2022-05-19 DIAGNOSIS — I10 HYPERTENSION, UNSPECIFIED TYPE: ICD-10-CM

## 2022-05-20 DIAGNOSIS — M47.12 CERVICAL SPONDYLOSIS WITH MYELOPATHY: Primary | ICD-10-CM

## 2022-05-23 ENCOUNTER — OFFICE VISIT (OUTPATIENT)
Dept: NEUROSURGERY | Facility: CLINIC | Age: 57
End: 2022-05-23
Payer: COMMERCIAL

## 2022-05-23 ENCOUNTER — HOSPITAL ENCOUNTER (OUTPATIENT)
Dept: RADIOLOGY | Facility: HOSPITAL | Age: 57
Discharge: HOME | End: 2022-05-23
Attending: PHYSICIAN ASSISTANT
Payer: COMMERCIAL

## 2022-05-23 VITALS
HEART RATE: 65 BPM | WEIGHT: 222 LBS | RESPIRATION RATE: 16 BRPM | DIASTOLIC BLOOD PRESSURE: 65 MMHG | HEIGHT: 69 IN | SYSTOLIC BLOOD PRESSURE: 124 MMHG | BODY MASS INDEX: 32.88 KG/M2

## 2022-05-23 DIAGNOSIS — M47.12 CERVICAL SPONDYLOSIS WITH MYELOPATHY: Primary | ICD-10-CM

## 2022-05-23 DIAGNOSIS — M47.12 CERVICAL SPONDYLOSIS WITH MYELOPATHY: ICD-10-CM

## 2022-05-23 PROCEDURE — 99213 OFFICE O/P EST LOW 20 MIN: CPT | Mod: ,,, | Performed by: PHYSICIAN ASSISTANT

## 2022-05-23 PROCEDURE — 99213 PR OFFICE/OUTPT VISIT, EST, LEVL III, 20-29 MIN: ICD-10-PCS | Mod: ,,, | Performed by: PHYSICIAN ASSISTANT

## 2022-05-23 PROCEDURE — 72052 X-RAY EXAM NECK SPINE 6/>VWS: CPT | Mod: TC

## 2022-05-23 RX ORDER — TESTOSTERONE ENANTHATE 100 MG/.5ML
INJECTION SUBCUTANEOUS WEEKLY
COMMUNITY
Start: 2022-05-17

## 2022-05-23 RX ORDER — IBUPROFEN 800 MG/1
TABLET ORAL
COMMUNITY
End: 2022-07-20

## 2022-05-23 RX ORDER — PANTOPRAZOLE SODIUM 40 MG/1
TABLET, DELAYED RELEASE ORAL DAILY
COMMUNITY
Start: 2022-04-13 | End: 2022-06-06

## 2022-05-23 RX ORDER — FENOFIBRATE 145 MG/1
TABLET, FILM COATED ORAL DAILY
COMMUNITY
Start: 2022-03-16 | End: 2022-06-06

## 2022-05-23 RX ORDER — TERAZOSIN 2 MG/1
CAPSULE ORAL 2 TIMES DAILY
COMMUNITY
Start: 2022-05-09 | End: 2022-06-06

## 2022-05-23 RX ORDER — ESCITALOPRAM OXALATE 10 MG/1
TABLET ORAL DAILY
COMMUNITY
Start: 2022-04-08 | End: 2022-06-06

## 2022-05-23 RX ORDER — AMLODIPINE BESYLATE 2.5 MG/1
TABLET ORAL DAILY
COMMUNITY
End: 2022-06-06

## 2022-05-23 RX ORDER — INSULIN GLARGINE 100 [IU]/ML
60 INJECTION, SOLUTION SUBCUTANEOUS NIGHTLY
COMMUNITY
Start: 2022-05-09

## 2022-05-23 RX ORDER — CLOPIDOGREL BISULFATE 75 MG/1
TABLET ORAL DAILY
COMMUNITY
Start: 2022-04-28 | End: 2022-06-06

## 2022-05-23 RX ORDER — TELMISARTAN 80 MG/1
TABLET ORAL DAILY
COMMUNITY
Start: 2022-04-25 | End: 2022-07-20 | Stop reason: ALTCHOICE

## 2022-05-23 RX ORDER — LABETALOL 200 MG/1
TABLET, FILM COATED ORAL DAILY
COMMUNITY
Start: 2022-04-14 | End: 2022-06-06

## 2022-05-23 RX ORDER — GLIMEPIRIDE 4 MG/1
TABLET ORAL DAILY
COMMUNITY
Start: 2022-04-18 | End: 2022-06-06

## 2022-05-23 RX ORDER — DAPAGLIFLOZIN 10 MG/1
TABLET, FILM COATED ORAL DAILY
COMMUNITY
Start: 2022-04-11 | End: 2022-06-06

## 2022-05-23 RX ORDER — ROSUVASTATIN CALCIUM 20 MG/1
20 TABLET, COATED ORAL DAILY
COMMUNITY
Start: 2022-05-12

## 2022-05-23 RX ORDER — NITROGLYCERIN 0.4 MG/1
TABLET SUBLINGUAL
COMMUNITY
Start: 2022-05-17

## 2022-05-23 RX ORDER — NIFEDIPINE 30 MG/1
30 TABLET, EXTENDED RELEASE ORAL DAILY
COMMUNITY
Start: 2022-05-19 | End: 2023-07-24

## 2022-05-23 RX ORDER — DULAGLUTIDE 1.5 MG/.5ML
INJECTION, SOLUTION SUBCUTANEOUS WEEKLY
COMMUNITY
Start: 2022-04-27 | End: 2022-06-06

## 2022-05-23 RX ORDER — UMECLIDINIUM BROMIDE AND VILANTEROL TRIFENATATE 62.5; 25 UG/1; UG/1
1 POWDER RESPIRATORY (INHALATION) DAILY
COMMUNITY
Start: 2022-04-27 | End: 2024-01-31

## 2022-05-23 RX ORDER — ASPIRIN 81 MG/1
TABLET ORAL DAILY
COMMUNITY
End: 2022-07-20

## 2022-05-23 RX ORDER — ICOSAPENT ETHYL 1000 MG/1
CAPSULE ORAL 2 TIMES DAILY
COMMUNITY
Start: 2022-04-28 | End: 2022-06-06

## 2022-05-23 RX ORDER — TORSEMIDE 20 MG/1
TABLET ORAL DAILY
COMMUNITY
Start: 2022-04-14 | End: 2022-06-06

## 2022-05-23 NOTE — PROGRESS NOTES
[facility]  History & Physical  Neurosurgery      Nicholas Vera   [id number]   1965       HPI:  Nicholas Vera is a 56 y.o. male who presents for neurosurgical evaluation.  On 11/26/2021, the patient underwent C5-6 and C6-7 ACDF with Dr. Jett.  Currently he denies pain.  His neck feels good.  The numbness and pain that he was experiencing in his hands has improved.  He only feels discomfort in the hands intermittently.  He has tolerated increasing his level of activity.  He is participating in all activities he desires at home and at work.  He also played golf recently and tolerated that well.  He seen today for follow-up at 6 months postop.      Past Medical History:   Diagnosis Date    Arthritis of left shoulder region     CAD (coronary artery disease)     Cervical spondylosis with myelopathy     Cervicalgia     COPD (chronic obstructive pulmonary disease)     CVA (cerebral vascular accident)     DM (diabetes mellitus)     GERD (gastroesophageal reflux disease)     History of kidney problems     HTN (hypertension)     Hyperlipidemia     Spinal stenosis in cervical region        Past Surgical History:   Procedure Laterality Date    ANTERIOR CERVICAL DISCECTOMY W/ FUSION  11/26/2021    C5-6, C6-7 ACDF- Dr. Jett    CARPAL TUNNEL RELEASE      bilateral CTR & left UND- 2004 & 2020    CORONARY STENT PLACEMENT  2019    1 vessel    MICRODISCECTOMY OF SPINE  04/17/2015    L4-5. L5-S1 decompression; left L4-5 microdiscectomy- Dr. Amaro    VASECTOMY         Family History   Problem Relation Age of Onset    Multiple sclerosis Daughter        Social History     Socioeconomic History    Marital status:    Tobacco Use    Smoking status: Former Smoker    Smokeless tobacco: Never Used   Substance and Sexual Activity    Alcohol use: Yes     Alcohol/week: 1.0 - 2.0 standard drink     Types: 1 - 2 Shots of liquor per week    Drug use: Not Currently       Current Outpatient  Medications   Medication Sig Dispense Refill    amLODIPine (NORVASC) 2.5 MG tablet once daily.      ANORO ELLIPTA 62.5-25 mcg/actuation DsDv as needed.      BASAGLAR KWIKPEN U-100 INSULIN glargine 100 units/mL (3mL) SubQ pen once daily.      BUTALBITAL-ASPIRIN-CAFFEINE ORAL as needed.      canagliflozin (INVOKANA ORAL) Invokana Take No date recorded No form recorded No frequency recorded No route recorded No set duration recorded No set duration amount recorded active No dosage strength recorded No dosage strength units of measure recorded      clopidogreL (PLAVIX) 75 mg tablet once daily.      EScitalopram oxalate (LEXAPRO) 10 MG tablet once daily.      EZETIMIBE ORAL ezetimibe Take No date recorded No form recorded No frequency recorded No route recorded No set duration recorded No set duration amount recorded active No dosage strength recorded No dosage strength units of measure recorded      FARXIGA 10 mg tablet once daily.      fenofibrate (TRICOR) 145 MG tablet once daily.      glimepiride (AMARYL) 4 MG tablet once daily.      labetaloL (NORMODYNE) 200 MG tablet once daily.      METOPROLOL SUCCINATE ORAL once daily.      NIFEdipine (ADALAT CC) 60 MG TbSR Take by mouth 2 (two) times a day.      nitroGLYCERIN (NITROSTAT) 0.4 MG SL tablet as needed.      pantoprazole (PROTONIX) 40 MG tablet once daily.      pioglitazone HCl (ACTOS ORAL) once daily.      rosuvastatin (CRESTOR) 20 MG tablet once daily.      terazosin (HYTRIN) 2 MG capsule 2 (two) times a day.      torsemide (DEMADEX) 20 MG Tab once daily.      TRULICITY 1.5 mg/0.5 mL pen injector once a week.      ubidecarenone (COENZYME Q10, BULK, MISC) once daily.      VASCEPA 1 gram Cap 2 (two) times a day.      XYOSTED 100 mg/0.5 mL AtIn Inject into the skin once a week.      aspirin (ECOTRIN) 81 MG EC tablet once daily.      ibuprofen (ADVIL,MOTRIN) 800 MG tablet as needed.      telmisartan (MICARDIS) 80 MG Tab once daily.       No  current facility-administered medications for this visit.       Review of patient's allergies indicates:  No Known Allergies     ROS:    Review of Systems   Constitutional: Negative for chills and fever.   HENT: Negative for nosebleeds and sore throat.    Eyes: Negative for pain and visual disturbance.   Respiratory: Negative for cough, chest tightness and shortness of breath.    Cardiovascular: Negative for chest pain.   Gastrointestinal: Negative for diarrhea, nausea and vomiting.   Genitourinary: Negative for difficulty urinating, dysuria and hematuria.   Musculoskeletal: Negative for gait problem and myalgias.   Skin: Negative for rash.   Neurological: Negative for dizziness, facial asymmetry and headaches.   Psychiatric/Behavioral: Negative for confusion and sleep disturbance. The patient is not nervous/anxious.        PE:    Vitals:    05/23/22 1307   BP: 124/65   Pulse: 65   Resp: 16       General:  Pleasant. Well-nourished. Well-groomed.    Lungs:  Quiet, non-labored     Neurological:    Oriented to Person, Place, Time   Gait is normal.  Coordination is normal    Cervical x-rays were obtained on 05/23/2022.  Position of the hardware is stable.  There has been superior migration of the plate that has not changed when compared to x-rays from 01/04/2022.  The fusion at C5-6 and C6-7 does not appear to be solid yet.  With flexion and extension, there is no movement at the C5-6 level.  There is slight movement at the C6-7 level.    ASSESSMENT/PLAN:     1. Cervical spondylosis with myelopathy  - X-Ray Cervical Spine 5 View W Flex Extxt; Future  - X-Ray Cervical Spine 5 View W Flex Extxt; Future       Overall, the patient is doing very well.  He has continued to wear his bone growth stimulator.  He will continue with this until he is fused.  He will return for follow-up at 1 year postop with x-rays.  If he desires to check his fusion at 9 months postop, he will obtain x-rays and I can call him with those results.   This will determine if he will be able to stop using the bone growth stimulator.  All of his questions were answered.    Chart review included Dr. Jett's operative report, and cervical x-rays comparing new to the old.

## 2022-05-25 ENCOUNTER — OFFICE VISIT (OUTPATIENT)
Dept: HEMATOLOGY/ONCOLOGY | Facility: CLINIC | Age: 57
End: 2022-05-25
Payer: COMMERCIAL

## 2022-05-25 VITALS
TEMPERATURE: 98 F | RESPIRATION RATE: 18 BRPM | WEIGHT: 222.69 LBS | HEART RATE: 54 BPM | SYSTOLIC BLOOD PRESSURE: 101 MMHG | HEIGHT: 69 IN | DIASTOLIC BLOOD PRESSURE: 50 MMHG | OXYGEN SATURATION: 96 % | BODY MASS INDEX: 32.98 KG/M2

## 2022-05-25 DIAGNOSIS — D50.0 IRON DEFICIENCY ANEMIA DUE TO CHRONIC BLOOD LOSS: ICD-10-CM

## 2022-05-25 DIAGNOSIS — N18.5 ANEMIA DUE TO STAGE 5 CHRONIC KIDNEY DISEASE, NOT ON CHRONIC DIALYSIS: ICD-10-CM

## 2022-05-25 DIAGNOSIS — D63.1 ANEMIA DUE TO STAGE 5 CHRONIC KIDNEY DISEASE, NOT ON CHRONIC DIALYSIS: ICD-10-CM

## 2022-05-25 LAB
ALBUMIN SERPL-MCNC: 4.3 GM/DL (ref 3.5–5)
ALBUMIN/GLOB SERPL: 1.3 RATIO (ref 1.1–2)
ALP SERPL-CCNC: 59 UNIT/L (ref 40–150)
ALT SERPL-CCNC: 33 UNIT/L (ref 0–55)
AST SERPL-CCNC: 37 UNIT/L (ref 5–34)
BASOPHILS # BLD AUTO: 0.09 X10(3)/MCL (ref 0–0.2)
BASOPHILS NFR BLD AUTO: 1.3 %
BILIRUBIN DIRECT+TOT PNL SERPL-MCNC: 0.5 MG/DL
BUN SERPL-MCNC: 54.7 MG/DL (ref 8.4–25.7)
CALCIUM SERPL-MCNC: 10.1 MG/DL (ref 8.4–10.2)
CHLORIDE SERPL-SCNC: 107 MMOL/L (ref 98–107)
CO2 SERPL-SCNC: 20 MMOL/L (ref 22–29)
CREAT SERPL-MCNC: 4.21 MG/DL (ref 0.73–1.18)
EOSINOPHIL # BLD AUTO: 0.29 X10(3)/MCL (ref 0–0.9)
EOSINOPHIL NFR BLD AUTO: 4 %
ERYTHROCYTE [DISTWIDTH] IN BLOOD BY AUTOMATED COUNT: 14.3 % (ref 11.5–17)
FERRITIN SERPL-MCNC: 100.44 NG/ML (ref 21.81–274.66)
GLOBULIN SER-MCNC: 3.4 GM/DL (ref 2.4–3.5)
GLUCOSE SERPL-MCNC: 171 MG/DL (ref 74–100)
HCT VFR BLD AUTO: 40 % (ref 42–52)
HGB BLD-MCNC: 12.5 GM/DL (ref 14–18)
IMM GRANULOCYTES # BLD AUTO: 0.03 X10(3)/MCL (ref 0–0.02)
IMM GRANULOCYTES NFR BLD AUTO: 0.4 % (ref 0–0.43)
IRON SATN MFR SERPL: 15 % (ref 20–50)
IRON SERPL-MCNC: 50 UG/DL (ref 65–175)
LYMPHOCYTES # BLD AUTO: 1.01 X10(3)/MCL (ref 0.6–4.6)
LYMPHOCYTES NFR BLD AUTO: 14.1 %
MCH RBC QN AUTO: 26.7 PG (ref 27–31)
MCHC RBC AUTO-ENTMCNC: 31.3 MG/DL (ref 33–36)
MCV RBC AUTO: 85.5 FL (ref 80–94)
MONOCYTES # BLD AUTO: 0.6 X10(3)/MCL (ref 0.1–1.3)
MONOCYTES NFR BLD AUTO: 8.4 %
NEUTROPHILS # BLD AUTO: 5.2 X10(3)/MCL (ref 2.1–9.2)
NEUTROPHILS NFR BLD AUTO: 71.8 %
PLATELET # BLD AUTO: 326 X10(3)/MCL (ref 130–400)
PMV BLD AUTO: 9.5 FL (ref 9.4–12.4)
POTASSIUM SERPL-SCNC: 5.6 MMOL/L (ref 3.5–5.1)
PROT SERPL-MCNC: 7.7 GM/DL (ref 6.4–8.3)
RBC # BLD AUTO: 4.68 X10(6)/MCL (ref 4.7–6.1)
SODIUM SERPL-SCNC: 138 MMOL/L (ref 136–145)
TIBC SERPL-MCNC: 280 UG/DL (ref 69–240)
TIBC SERPL-MCNC: 330 UG/DL (ref 250–450)
TRANSFERRIN SERPL-MCNC: 315 MG/DL (ref 174–364)
WBC # SPEC AUTO: 7.2 X10(3)/MCL (ref 4.5–11.5)

## 2022-05-25 PROCEDURE — 99999 PR PBB SHADOW E&M-EST. PATIENT-LVL V: ICD-10-PCS | Mod: PBBFAC,,, | Performed by: INTERNAL MEDICINE

## 2022-05-25 PROCEDURE — 82728 ASSAY OF FERRITIN: CPT | Performed by: INTERNAL MEDICINE

## 2022-05-25 PROCEDURE — 82668 ASSAY OF ERYTHROPOIETIN: CPT | Performed by: INTERNAL MEDICINE

## 2022-05-25 PROCEDURE — 36415 COLL VENOUS BLD VENIPUNCTURE: CPT | Performed by: INTERNAL MEDICINE

## 2022-05-25 PROCEDURE — 80053 COMPREHEN METABOLIC PANEL: CPT | Performed by: INTERNAL MEDICINE

## 2022-05-25 PROCEDURE — 99205 PR OFFICE/OUTPT VISIT, NEW, LEVL V, 60-74 MIN: ICD-10-PCS | Mod: S$GLB,,, | Performed by: INTERNAL MEDICINE

## 2022-05-25 PROCEDURE — 84165 PROTEIN E-PHORESIS SERUM: CPT | Performed by: INTERNAL MEDICINE

## 2022-05-25 PROCEDURE — 99999 PR PBB SHADOW E&M-EST. PATIENT-LVL V: CPT | Mod: PBBFAC,,, | Performed by: INTERNAL MEDICINE

## 2022-05-25 PROCEDURE — 83540 ASSAY OF IRON: CPT | Performed by: INTERNAL MEDICINE

## 2022-05-25 PROCEDURE — 99205 OFFICE O/P NEW HI 60 MIN: CPT | Mod: S$GLB,,, | Performed by: INTERNAL MEDICINE

## 2022-05-25 PROCEDURE — 85025 COMPLETE CBC W/AUTO DIFF WBC: CPT | Performed by: INTERNAL MEDICINE

## 2022-05-25 PROCEDURE — 82525 ASSAY OF COPPER: CPT | Performed by: INTERNAL MEDICINE

## 2022-05-25 NOTE — PROGRESS NOTES
Subjective:       Patient ID: Nicholas Vera is a 56 y.o. male.    Chief Complaint: Consult ([facility]--anemia)  Tired and anemic    Diagnosis:  1. Normocytic Anemia  2. Iron deficiency anemia  3. Acute blood loss anemia  4. CKD stage 5    Current Treatment:   1. Pending work-up    Treatment History:  1. Blood transfusions     HPI   Patient with coronary artery disease, CKD stage 5 (follows with Dr. Guido Lozada), diabetes type 2, hypertension, TIA who noticed blood per rectum in February 2022. He underwent a colonoscopy on 02/18/2022, unfortunately had a poor prep.  Repeat was done on 02/25/2022, also with poor prep.  The patient then had another colonoscopy on 03/14/2022 that revealed nonbleeding internal hemorrhoids, multiple small mouth diverticula in the sigmoid colon without evidence of bleed.  There are multiple polyps and there was a single solitary 15 mm ulcer in the transverse colon with stigmata of recent bleeding.  Three hemostatic clips were successfully placed.  Multiple biopsies were taken and these all returned benign.  The patient also had an EGD on 03/14/2022 that showed no evidence of bleeding.  Patient required multiple blood transfusions.  He was found to be iron deficient.  He also was found to have significantly elevated serum creatinine.  He was referred to Hematology for further evaluation of anemia.  I initially saw the patient on 05/25/2022.  He states he had very significant fatigue.      Interval History:   Patient presents to clinic for initial consult.    Past Medical History:   Diagnosis Date    Acid reflux     Anxiety     Atrial fibrillation     CAD (coronary artery disease)     Carpal tunnel syndrome     Cervical radiculopathy     CVA (cerebral vascular accident)     GERD (gastroesophageal reflux disease)     High cholesterol     HTN (hypertension)     Sleep apnea     TIA (transient ischemic attack)     Type 2 diabetes mellitus       Past Surgical History:   Procedure  Laterality Date    CARPAL TUNNEL RELEASE Bilateral     CERVICAL FUSION      COLONOSCOPY      CORONARY ANGIOPLASTY WITH STENT PLACEMENT      ELBOW SURGERY Right     ELBOW SURGERY Left     EXCISION OF EXTERNAL EAR      FUNCTIONAL ENDOSCOPIC SINUS SURGERY (FESS)      INGUINAL HERNIA REPAIR      LUMBAR LAMINECTOMY      MYRINGOTOMY W/ TUBES      NASAL SEPTOPLASTY      NEUROPLASTY      SINUS ENDOSCOPY      TONSILLECTOMY, ADENOIDECTOMY      VASECTOMY       Social History     Socioeconomic History    Marital status:    Tobacco Use    Smoking status: Former Smoker    Smokeless tobacco: Never Used   Substance and Sexual Activity    Alcohol use: Never    Drug use: Never      Family History   Family history unknown: Yes      Review of patient's allergies indicates:  No Known Allergies   Review of Systems   Constitutional: Positive for fatigue. Negative for appetite change and unexpected weight change.   HENT: Negative for mouth sores.    Eyes: Negative for visual disturbance.   Respiratory: Positive for shortness of breath. Negative for cough.    Cardiovascular: Negative for chest pain.   Gastrointestinal: Negative for abdominal pain and diarrhea.   Genitourinary: Negative for frequency.   Musculoskeletal: Negative for back pain.   Integumentary:  Negative for rash.   Neurological: Negative for headaches.   Hematological: Negative for adenopathy.   Psychiatric/Behavioral: The patient is not nervous/anxious.          Objective:      Physical Exam  Vitals reviewed.   Constitutional:       General: He is awake.      Appearance: Normal appearance.   HENT:      Head: Normocephalic and atraumatic.      Right Ear: Hearing normal.      Left Ear: Hearing normal.      Nose: Nose normal.   Eyes:      General: Lids are normal. Vision grossly intact.      Extraocular Movements: Extraocular movements intact.      Conjunctiva/sclera: Conjunctivae normal.   Cardiovascular:      Rate and Rhythm: Normal rate and  regular rhythm.      Pulses: Normal pulses.      Heart sounds: Normal heart sounds.   Pulmonary:      Effort: Pulmonary effort is normal.      Breath sounds: Normal breath sounds. No wheezing, rhonchi or rales.   Chest:   Breasts:      Right: No axillary adenopathy or supraclavicular adenopathy.      Left: No axillary adenopathy or supraclavicular adenopathy.       Abdominal:      General: Bowel sounds are normal.      Palpations: Abdomen is soft.      Tenderness: There is no abdominal tenderness.   Musculoskeletal:      Cervical back: Full passive range of motion without pain and normal range of motion.      Right lower leg: No edema.      Left lower leg: No edema.   Lymphadenopathy:      Cervical: No cervical adenopathy.      Upper Body:      Right upper body: No supraclavicular or axillary adenopathy.      Left upper body: No supraclavicular or axillary adenopathy.   Skin:     General: Skin is warm.   Neurological:      General: No focal deficit present.      Mental Status: He is alert and oriented to person, place, and time.      Cranial Nerves: Cranial nerves are intact.   Psychiatric:         Attention and Perception: Attention normal.         Mood and Affect: Mood and affect normal.         Behavior: Behavior is cooperative.         LABS AND IMAGING REVIEWED IN EPIC          Assessment:   1. Normocytic Anemia  2. Iron deficiency anemia  3. Acute blood loss anemia  4. CKD stage 5        Plan:       Patient has normocytic anemia.  Please note, he also has iron deficiency and CKD 5.  He will likely need hemodialysis in the near future.    Will check CBC, CMP, Iron profile, Copper, and Erythropoietin level today.  Patient may be eligible for erythropoietin stimulating agent injections.    Will also check serum protein electrophoresis and immunofixation due to kidney disease and anemia.  I do not think he has a plasma cell dyscrasia, but want to rule it out.    I did discuss the possibility of a bone marrow biopsy  with the patient.  I do not think it is necessary at this time, but explained that it might be something we should consider in the future if workup done today does not reveal any reason for his anemia.    Return to clinic in 4 weeks with repeat CBC and CMP    All questions were answered to the patient to the best of my ability and he understands the plan moving forward.    Miguelito Pineda II, MD

## 2022-05-26 LAB
IGA SERPL-MCNC: 174 MG/DL (ref 63–484)
IGG SERPL-MCNC: 1039 MG/DL (ref 540–1822)
IGM SERPL-MCNC: 164 MG/DL (ref 22–240)

## 2022-05-26 PROCEDURE — 36415 COLL VENOUS BLD VENIPUNCTURE: CPT | Performed by: INTERNAL MEDICINE

## 2022-05-26 PROCEDURE — 82784 ASSAY IGA/IGD/IGG/IGM EACH: CPT | Performed by: INTERNAL MEDICINE

## 2022-05-26 PROCEDURE — 83883 ASSAY NEPHELOMETRY NOT SPEC: CPT | Performed by: INTERNAL MEDICINE

## 2022-05-27 LAB
COPPER SERPL-MCNC: 1.09 MCG/ML (ref 0.75–1.45)
EPO SERPL-ACNC: 7 MIU/ML (ref 2.6–18.5)
KAPPA LC FREE SER-MCNC: 10.3 MG/DL (ref 0.33–1.94)
KAPPA LC FREE/LAMBDA FREE SER: 2.48 {RATIO} (ref 0.26–1.65)
LAMBDA LC FREE SERPL-MCNC: 4.16 MG/DL (ref 0.57–2.63)

## 2022-05-30 ENCOUNTER — TELEPHONE (OUTPATIENT)
Dept: NEPHROLOGY | Facility: CLINIC | Age: 57
End: 2022-05-30

## 2022-05-30 NOTE — TELEPHONE ENCOUNTER
Called patient regarding elevated Potassium, Dr Lozada ordered Lokelma 10gm daily and repeat CMP in 1 week. 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 He will do blood work at that time. Instructed patient on low potassium diet. Patient verbalized understanding.

## 2022-06-02 LAB
ALBUMIN % SPEP (OHS): 48.93 (ref 48.1–59.5)
ALBUMIN SERPL-MCNC: 3.8 GM/DL (ref 3.5–5)
ALBUMIN/GLOB SERPL: 1 RATIO (ref 1.1–2)
ALPHA 1 GLOB (OHS): 0.21 GM/DL (ref 0–0.4)
ALPHA 1 GLOB% (OHS): 2.66 (ref 2.3–4.9)
ALPHA 2 GLOB % (OHS): 16.48 (ref 6.9–13)
ALPHA 2 GLOB (OHS): 1.27 GM/DL (ref 0.4–1)
BETA GLOB (OHS): 1.2 GM/DL (ref 0.7–1.3)
BETA GLOB% (OHS): 15.56 (ref 13.8–19.7)
GAMMA GLOBULIN % (OHS): 16.37 (ref 10.1–21.9)
GAMMA GLOBULIN (OHS): 1.26 GM/DL (ref 0.4–1.8)
GLOBULIN SER-MCNC: 3.9 GM/DL (ref 2.4–3.5)
M SPIKE % (OHS): ABNORMAL
M SPIKE (OHS): ABNORMAL
PATH REV: NORMAL
PEP NOTE (OHS): ABNORMAL
PROT SERPL-MCNC: 7.7 GM/DL (ref 6.4–8.3)

## 2022-06-10 ENCOUNTER — LAB VISIT (OUTPATIENT)
Dept: LAB | Facility: HOSPITAL | Age: 57
End: 2022-06-10
Attending: INTERNAL MEDICINE
Payer: COMMERCIAL

## 2022-06-10 DIAGNOSIS — I10 HYPERTENSION, UNSPECIFIED TYPE: ICD-10-CM

## 2022-06-10 DIAGNOSIS — N18.4 CKD (CHRONIC KIDNEY DISEASE) STAGE 4, GFR 15-29 ML/MIN: ICD-10-CM

## 2022-06-10 DIAGNOSIS — N18.9 ANEMIA IN CHRONIC KIDNEY DISEASE, UNSPECIFIED CKD STAGE: ICD-10-CM

## 2022-06-10 DIAGNOSIS — D63.1 ANEMIA IN CHRONIC KIDNEY DISEASE, UNSPECIFIED CKD STAGE: ICD-10-CM

## 2022-06-10 LAB
APPEARANCE UR: CLEAR
BACTERIA #/AREA URNS AUTO: NORMAL /HPF
BILIRUB UR QL STRIP.AUTO: NEGATIVE MG/DL
COLOR UR AUTO: YELLOW
CREAT UR-MCNC: 58.8 MG/DL (ref 63–166)
GLUCOSE UR QL STRIP.AUTO: 500 MG/DL
KETONES UR QL STRIP.AUTO: NEGATIVE MG/DL
LEUKOCYTE ESTERASE UR QL STRIP.AUTO: NEGATIVE UNIT/L
NITRITE UR QL STRIP.AUTO: NEGATIVE
PH UR STRIP.AUTO: 7 [PH]
PROT UR QL STRIP.AUTO: >=300 MG/DL
PROT UR STRIP-MCNC: 278.7 MG/DL
RBC #/AREA URNS AUTO: NORMAL /HPF
RBC UR QL AUTO: NEGATIVE UNIT/L
SP GR UR STRIP.AUTO: 1.02
SQUAMOUS #/AREA URNS AUTO: NORMAL /LPF
UROBILINOGEN UR STRIP-ACNC: 0.2 MG/DL
WBC #/AREA URNS AUTO: NORMAL /HPF

## 2022-06-10 PROCEDURE — 82570 ASSAY OF URINE CREATININE: CPT

## 2022-06-10 PROCEDURE — 81001 URINALYSIS AUTO W/SCOPE: CPT

## 2022-06-10 PROCEDURE — 82042 OTHER SOURCE ALBUMIN QUAN EA: CPT

## 2022-06-13 ENCOUNTER — OFFICE VISIT (OUTPATIENT)
Dept: NEPHROLOGY | Facility: CLINIC | Age: 57
End: 2022-06-13
Payer: COMMERCIAL

## 2022-06-13 VITALS
WEIGHT: 230.81 LBS | TEMPERATURE: 99 F | DIASTOLIC BLOOD PRESSURE: 70 MMHG | HEIGHT: 68 IN | RESPIRATION RATE: 20 BRPM | OXYGEN SATURATION: 94 % | BODY MASS INDEX: 34.98 KG/M2 | HEART RATE: 65 BPM | SYSTOLIC BLOOD PRESSURE: 130 MMHG

## 2022-06-13 DIAGNOSIS — D63.1 ANEMIA IN CHRONIC KIDNEY DISEASE, UNSPECIFIED CKD STAGE: ICD-10-CM

## 2022-06-13 DIAGNOSIS — N18.4 CKD (CHRONIC KIDNEY DISEASE) STAGE 4, GFR 15-29 ML/MIN: Primary | ICD-10-CM

## 2022-06-13 DIAGNOSIS — N18.9 ANEMIA IN CHRONIC KIDNEY DISEASE, UNSPECIFIED CKD STAGE: ICD-10-CM

## 2022-06-13 DIAGNOSIS — I10 HYPERTENSION, UNSPECIFIED TYPE: ICD-10-CM

## 2022-06-13 PROCEDURE — 99214 OFFICE O/P EST MOD 30 MIN: CPT | Mod: S$GLB,,, | Performed by: INTERNAL MEDICINE

## 2022-06-13 PROCEDURE — 99999 PR PBB SHADOW E&M-EST. PATIENT-LVL V: ICD-10-PCS | Mod: PBBFAC,,, | Performed by: INTERNAL MEDICINE

## 2022-06-13 PROCEDURE — 99999 PR PBB SHADOW E&M-EST. PATIENT-LVL V: CPT | Mod: PBBFAC,,, | Performed by: INTERNAL MEDICINE

## 2022-06-13 PROCEDURE — 99214 PR OFFICE/OUTPT VISIT, EST, LEVL IV, 30-39 MIN: ICD-10-PCS | Mod: S$GLB,,, | Performed by: INTERNAL MEDICINE

## 2022-06-13 NOTE — PROGRESS NOTES
[facility] Progress Note      HPI  Nicholas Vera is a 56 y.o. male here for a follow up visit for CKD 4 related to diabetic nephropathy and nephrosclerosis. His renal indices fluctuate with cardiac output and diuresis.  He is feeling good. He denies SOB. His H&H has increased and he did not receive any epogen injections. He was found to have hyperkalemia on labs 5/25 but he was offshore and unable to take lokelma, he was instructed to follow low K diet which did bring K down to 4.6. Last visit we discussed long term access for possible HD in future. He is in agreement to go ahead and get the process started, as well as talking to family about possible kidney transplant. He would also be more interested in PD.    Medical History:   Past Medical History:   Diagnosis Date    Acid reflux     Anxiety     Arthritis of left shoulder region     Atrial fibrillation     CAD (coronary artery disease)     Carpal tunnel syndrome     Cervical radiculopathy     Cervical spondylosis with myelopathy     Cervicalgia     COPD (chronic obstructive pulmonary disease)     CVA (cerebral vascular accident)     DM (diabetes mellitus)     GERD (gastroesophageal reflux disease)     High cholesterol     History of kidney problems     HTN (hypertension)     Hyperlipidemia     Sleep apnea     Spinal stenosis in cervical region     TIA (transient ischemic attack)     Type 2 diabetes mellitus        Surgical History:   Past Surgical History:   Procedure Laterality Date    ANTERIOR CERVICAL DISCECTOMY W/ FUSION  11/26/2021    C5-6, C6-7 ACDF- Dr. Jett    CARPAL TUNNEL RELEASE      bilateral CTR & left UND- 2004 & 2020    CARPAL TUNNEL RELEASE Bilateral     CERVICAL FUSION      COLONOSCOPY      CORONARY ANGIOPLASTY WITH STENT PLACEMENT      CORONARY STENT PLACEMENT  2019    1 vessel    ELBOW SURGERY Right     ELBOW SURGERY Left     EXCISION OF EXTERNAL EAR      FUNCTIONAL ENDOSCOPIC SINUS SURGERY  (FESS)      INGUINAL HERNIA REPAIR      LUMBAR LAMINECTOMY      MICRODISCECTOMY OF SPINE  04/17/2015    L4-5. L5-S1 decompression; left L4-5 microdiscectomy- Dr. Amaro    MYRINGOTOMY W/ TUBES      NASAL SEPTOPLASTY      NEUROPLASTY      SINUS ENDOSCOPY      TONSILLECTOMY, ADENOIDECTOMY      VASECTOMY         Family History:   Family History   Problem Relation Age of Onset    Multiple sclerosis Daughter        Social History:   Social History     Tobacco Use    Smoking status: Former Smoker    Smokeless tobacco: Never Used   Substance Use Topics    Alcohol use: Never       Allergies:  Review of patient's allergies indicates:  No Known Allergies    Medications:    Current Outpatient Medications:     ANORO ELLIPTA 62.5-25 mcg/actuation DsDv, as needed., Disp: , Rfl:     ascorbic acid, vitamin C, (VITAMIN C) 500 MG tablet, Take 500 mg by mouth once daily., Disp: , Rfl:     cholecalciferol, vitamin D3, 1,250 mcg (50,000 unit) capsule, Take 1,250 mcg by mouth once a week., Disp: , Rfl:     clopidogreL (PLAVIX) 75 mg tablet, Take 75 mg by mouth once daily at 6am., Disp: , Rfl:     EScitalopram oxalate (LEXAPRO) 10 MG tablet, Take 10 mg by mouth once daily at 6am., Disp: , Rfl:     FARXIGA 10 mg tablet, Take 10 mg by mouth once daily at 6am., Disp: , Rfl:     fenofibrate (TRICOR) 145 MG tablet, Take 145 mg by mouth once daily at 6am., Disp: , Rfl:     ferrous sulfate/ascorbic acid (MOL-IRON WITH VITAMIN C ORAL), Take 2 capsules by mouth once daily at 6am., Disp: , Rfl:     glimepiride (AMARYL) 4 MG tablet, Take 4 mg by mouth 2 (two) times a day., Disp: , Rfl:     insulin glargine (LANTUS U-100 INSULIN) 100 unit/mL injection, Inject 50 Units into the skin once daily at 6am., Disp: , Rfl:     isosorbide mononitrate (IMDUR) 30 MG 24 hr tablet, Take 1 tablet (30 mg total) by mouth once daily., Disp: 90 tablet, Rfl: 0    labetaloL (NORMODYNE) 200 MG tablet, Take 200 mg by mouth 2 (two) times a day.,  Disp: , Rfl:     multivit-mins/iron/folic/lycop (CENTRUM MEN ORAL), Take 1 tablet by mouth once daily at 6am., Disp: , Rfl:     NIFEdipine (ADALAT CC) 60 MG TbSR, Take by mouth 2 (two) times a day., Disp: , Rfl:     nitroGLYCERIN (NITROSTAT) 0.4 MG SL tablet, as needed., Disp: , Rfl:     pantoprazole (PROTONIX) 40 MG tablet, Take 40 mg by mouth once daily at 6am., Disp: , Rfl:     rosuvastatin (CRESTOR) 20 MG tablet, Take 20 mg by mouth once daily., Disp: , Rfl:     terazosin (HYTRIN) 2 MG capsule, Take 2 mg by mouth 2 (two) times a day., Disp: , Rfl:     torsemide (DEMADEX) 20 MG Tab, Take 40 mg by mouth once daily at 6am., Disp: , Rfl:     TRULICITY 1.5 mg/0.5 mL pen injector, once a week., Disp: , Rfl:     VASCEPA 1 gram Cap, Take 2 tablets by mouth 2 (two) times a day., Disp: , Rfl:     XYOSTED 100 mg/0.5 mL AtIn, Inject into the skin once a week., Disp: , Rfl:     amlodipine-valsartan (EXFORGE)  mg per tablet, Exforge 10 mg-320 mg tablet, Disp: , Rfl:     aspirin (ECOTRIN) 81 MG EC tablet, once daily., Disp: , Rfl:     BASAGLAR KWIKPEN U-100 INSULIN glargine 100 units/mL (3mL) SubQ pen, once daily., Disp: , Rfl:     BUTALBITAL-ASPIRIN-CAFFEINE ORAL, as needed., Disp: , Rfl:     canagliflozin (INVOKANA ORAL), Invokana Take No date recorded No form recorded No frequency recorded No route recorded No set duration recorded No set duration amount recorded active No dosage strength recorded No dosage strength units of measure recorded, Disp: , Rfl:     EZETIMIBE ORAL, ezetimibe Take No date recorded No form recorded No frequency recorded No route recorded No set duration recorded No set duration amount recorded active No dosage strength recorded No dosage strength units of measure recorded, Disp: , Rfl:     ibuprofen (ADVIL,MOTRIN) 800 MG tablet, as needed., Disp: , Rfl:     METOPROLOL SUCCINATE ORAL, once daily., Disp: , Rfl:     pioglitazone HCl (ACTOS ORAL), once daily., Disp: , Rfl:      "sodium zirconium cyclosilicate (LOKELMA) 10 gram packet, Take 1 packet (10 g total) by mouth once daily. Mix entire contents of packet(s) into drinking glass containing 3 tablespoons of water; stir well and drink immediately. Add water and repeat until no powder remains to receive entire dose. (Patient not taking: Reported on 6/13/2022), Disp: 30 packet, Rfl: 0    telmisartan (MICARDIS) 80 MG Tab, once daily., Disp: , Rfl:     ubidecarenone (COENZYME Q10, BULK, MISC), once daily., Disp: , Rfl:        ROS:    Constitutional: Denies fever, fatigue, generalized weakness, night sweats, or acute weight change  Skin: Denies wounds, no rashes, no itching, no new skin lesions  HEENT: Denies acute change in hearing or vision, tinnitus, or dysphagia  Respiratory:  Denies cough, shortness of breath, or wheezing  Cardiovascular: Denies chest pain, palpitations, or swelling  Gastrointestional: Denies abdominal pain, nausea, vomiting, diarrhea, or constipation  Genitourinary: Denies dysuria, hematuria, or incontinence; reports able to empty bladder  Musculoskeletal: Denies myalgias, back pain, decreased ROM or focal weakness  Neurological: Denies headaches, seizures, dizziness, paresthesias or weakness  Hematological: Denies unusual bruising or bleeding  Psychiatric: Denies hallucinations, depression, or confusion      Vital Signs:  /70 (BP Location: Left arm, Patient Position: Sitting)   Pulse 65   Temp 99.1 °F (37.3 °C) (Oral)   Resp 20   Ht 5' 8" (1.727 m)   Wt 104.7 kg (230 lb 13.2 oz)   SpO2 (!) 94%   BMI 35.10 kg/m²   Body mass index is 35.1 kg/m².      Physical Exam:    General: no acute distress, awake, alert  Eyes: PERRLA, EOMI, conjunctiva clear, eyelids without swelling  HENT: atraumatic, oropharynx and nasal mucosa patent  Neck: full ROM, no JVD, no thyromegaly or lymphadenopathy  Respiratory: equal, unlabored, clear to auscultation A/P  Cardiovascular: RRR without murmur or rub; radial and pedal pulses " felt  Edema: tr to +1 lower ext  Gastrointestinal: soft, non-tender, non-distended; positive bowel sounds; no masses to palpation  Genitourinary: no CVA tenderness upon palpation  Musculoskeletal: full ROM without limitation or discomfort  Integumentary: warm, dry; no rashes, wounds, or skin lesions  Neurological: oriented, appropriate, no acute deficits      Labs:        Component Value Date/Time     06/10/2022 1021     05/25/2022 1410     09/21/2012 1041    K 4.6 06/10/2022 1021    K 5.6 (H) 05/25/2022 1410    K 4.5 09/21/2012 1041     09/21/2012 1041    CO2 21 (L) 06/10/2022 1021    CO2 20 (L) 05/25/2022 1410    CO2 22 (L) 09/21/2012 1041    BUN 42.7 (H) 06/10/2022 1021    BUN 54.7 (H) 05/25/2022 1410    BUN 12 09/21/2012 1041    CREATININE 3.72 (H) 06/10/2022 1021    CREATININE 4.21 (H) 05/25/2022 1410    CREATININE 0.6 09/21/2012 1041    CALCIUM 9.4 06/10/2022 1021    CALCIUM 10.1 05/25/2022 1410    CALCIUM 9.4 09/21/2012 1041    PHOS 5.4 (H) 05/25/2022 1410            Component Value Date/Time    WBC 7.4 06/10/2022 1021    WBC 7.2 05/25/2022 1410    WBC 9.26 09/21/2012 1041    HGB 11.1 (L) 06/10/2022 1021    HGB 12.5 (L) 05/25/2022 1410    HGB 13.4 (L) 09/21/2012 1041    HCT 34.9 (L) 06/10/2022 1021    HCT 40.0 (L) 05/25/2022 1410    HCT 40.2 09/21/2012 1041     06/10/2022 1021     05/25/2022 1410     09/21/2012 1041       @[id number](coloru,clarityu,specgrav,phur,proteinua,glucoseu,bilirubincon,bloodu,wbcu,rbcu,bacteria,mucus,nitrite,leukocytesur,urobilinogen,hyalinecasts)@      Impression:    Patient Active Problem List   Diagnosis    Anemia due to stage 5 chronic kidney disease, not on chronic dialysis    Iron deficiency anemia due to chronic blood loss    Cervical spondylosis with myelopathy     1. CKD (chronic kidney disease) stage 4, GFR 15-29 ml/min     2. Hypertension, unspecified type     3. Anemia in chronic kidney disease, unspecified CKD stage        Renal function surprisingly stable  K stable  Volume status stable  Hemodynamics stable    Plan:  Cont same meds/plans  Refer to Dr Barrow for AV fistula   Labs in 4 weeks and FU then       Guido Lozada

## 2022-06-15 ENCOUNTER — LAB VISIT (OUTPATIENT)
Dept: LAB | Facility: HOSPITAL | Age: 57
End: 2022-06-15
Attending: INTERNAL MEDICINE
Payer: COMMERCIAL

## 2022-06-15 DIAGNOSIS — I10 HYPERTENSION, UNSPECIFIED TYPE: ICD-10-CM

## 2022-06-15 DIAGNOSIS — D63.1 ANEMIA IN CHRONIC KIDNEY DISEASE, UNSPECIFIED CKD STAGE: ICD-10-CM

## 2022-06-15 DIAGNOSIS — N18.9 ANEMIA IN CHRONIC KIDNEY DISEASE, UNSPECIFIED CKD STAGE: ICD-10-CM

## 2022-06-15 DIAGNOSIS — N18.4 CKD (CHRONIC KIDNEY DISEASE) STAGE 4, GFR 15-29 ML/MIN: ICD-10-CM

## 2022-06-15 LAB
BASOPHILS # BLD AUTO: 0.09 X10(3)/MCL (ref 0–0.2)
BASOPHILS NFR BLD AUTO: 1.3 %
EOSINOPHIL # BLD AUTO: 0.38 X10(3)/MCL (ref 0–0.9)
EOSINOPHIL NFR BLD AUTO: 5.5 %
ERYTHROCYTE [DISTWIDTH] IN BLOOD BY AUTOMATED COUNT: 15.1 % (ref 11.5–17)
HCT VFR BLD AUTO: 32.9 % (ref 42–52)
HGB BLD-MCNC: 10.2 GM/DL (ref 14–18)
IMM GRANULOCYTES # BLD AUTO: 0.02 X10(3)/MCL (ref 0–0.02)
IMM GRANULOCYTES NFR BLD AUTO: 0.3 % (ref 0–0.43)
LYMPHOCYTES # BLD AUTO: 0.89 X10(3)/MCL (ref 0.6–4.6)
LYMPHOCYTES NFR BLD AUTO: 12.8 %
MCH RBC QN AUTO: 26.6 PG (ref 27–31)
MCHC RBC AUTO-ENTMCNC: 31 MG/DL (ref 33–36)
MCV RBC AUTO: 85.9 FL (ref 80–94)
MONOCYTES # BLD AUTO: 0.5 X10(3)/MCL (ref 0.1–1.3)
MONOCYTES NFR BLD AUTO: 7.2 %
NEUTROPHILS # BLD AUTO: 5.1 X10(3)/MCL (ref 2.1–9.2)
NEUTROPHILS NFR BLD AUTO: 72.9 %
PLATELET # BLD AUTO: 246 X10(3)/MCL (ref 130–400)
PMV BLD AUTO: 10.9 FL (ref 9.4–12.4)
RBC # BLD AUTO: 3.83 X10(6)/MCL (ref 4.7–6.1)
WBC # SPEC AUTO: 7 X10(3)/MCL (ref 4.5–11.5)

## 2022-06-15 PROCEDURE — 85025 COMPLETE CBC W/AUTO DIFF WBC: CPT

## 2022-06-15 PROCEDURE — 36415 COLL VENOUS BLD VENIPUNCTURE: CPT

## 2022-06-22 ENCOUNTER — LAB VISIT (OUTPATIENT)
Dept: LAB | Facility: HOSPITAL | Age: 57
End: 2022-06-22
Attending: INTERNAL MEDICINE
Payer: COMMERCIAL

## 2022-06-22 ENCOUNTER — OFFICE VISIT (OUTPATIENT)
Dept: HEMATOLOGY/ONCOLOGY | Facility: CLINIC | Age: 57
End: 2022-06-22
Payer: COMMERCIAL

## 2022-06-22 VITALS
OXYGEN SATURATION: 95 % | HEART RATE: 70 BPM | DIASTOLIC BLOOD PRESSURE: 77 MMHG | WEIGHT: 227.75 LBS | RESPIRATION RATE: 18 BRPM | HEIGHT: 69 IN | BODY MASS INDEX: 33.73 KG/M2 | SYSTOLIC BLOOD PRESSURE: 177 MMHG | TEMPERATURE: 98 F

## 2022-06-22 DIAGNOSIS — D63.1 ANEMIA DUE TO STAGE 5 CHRONIC KIDNEY DISEASE, NOT ON CHRONIC DIALYSIS: Primary | ICD-10-CM

## 2022-06-22 DIAGNOSIS — D63.1 ANEMIA DUE TO STAGE 5 CHRONIC KIDNEY DISEASE, NOT ON CHRONIC DIALYSIS: ICD-10-CM

## 2022-06-22 DIAGNOSIS — N18.5 ANEMIA DUE TO STAGE 5 CHRONIC KIDNEY DISEASE, NOT ON CHRONIC DIALYSIS: ICD-10-CM

## 2022-06-22 DIAGNOSIS — N18.5 ANEMIA DUE TO STAGE 5 CHRONIC KIDNEY DISEASE, NOT ON CHRONIC DIALYSIS: Primary | ICD-10-CM

## 2022-06-22 DIAGNOSIS — D50.0 IRON DEFICIENCY ANEMIA DUE TO CHRONIC BLOOD LOSS: ICD-10-CM

## 2022-06-22 LAB
ALBUMIN SERPL-MCNC: 4.1 GM/DL (ref 3.5–5)
ALBUMIN/GLOB SERPL: 1.2 RATIO (ref 1.1–2)
ALP SERPL-CCNC: 56 UNIT/L (ref 40–150)
ALT SERPL-CCNC: 25 UNIT/L (ref 0–55)
AST SERPL-CCNC: 34 UNIT/L (ref 5–34)
BASOPHILS # BLD AUTO: 0.06 X10(3)/MCL (ref 0–0.2)
BASOPHILS NFR BLD AUTO: 0.8 %
BILIRUBIN DIRECT+TOT PNL SERPL-MCNC: 0.5 MG/DL
BUN SERPL-MCNC: 49.2 MG/DL (ref 8.4–25.7)
CALCIUM SERPL-MCNC: 8.8 MG/DL (ref 8.4–10.2)
CHLORIDE SERPL-SCNC: 109 MMOL/L (ref 98–107)
CO2 SERPL-SCNC: 19 MMOL/L (ref 22–29)
CREAT SERPL-MCNC: 3.81 MG/DL (ref 0.73–1.18)
EOSINOPHIL # BLD AUTO: 0.36 X10(3)/MCL (ref 0–0.9)
EOSINOPHIL NFR BLD AUTO: 4.9 %
ERYTHROCYTE [DISTWIDTH] IN BLOOD BY AUTOMATED COUNT: 15 % (ref 11.5–17)
GLOBULIN SER-MCNC: 3.4 GM/DL (ref 2.4–3.5)
GLUCOSE SERPL-MCNC: 126 MG/DL (ref 74–100)
HCT VFR BLD AUTO: 34.5 % (ref 42–52)
HGB BLD-MCNC: 11.1 GM/DL (ref 14–18)
IMM GRANULOCYTES # BLD AUTO: 0.01 X10(3)/MCL (ref 0–0.02)
IMM GRANULOCYTES NFR BLD AUTO: 0.1 % (ref 0–0.43)
LYMPHOCYTES # BLD AUTO: 0.98 X10(3)/MCL (ref 0.6–4.6)
LYMPHOCYTES NFR BLD AUTO: 13.3 %
MCH RBC QN AUTO: 27 PG (ref 27–31)
MCHC RBC AUTO-ENTMCNC: 32.2 MG/DL (ref 33–36)
MCV RBC AUTO: 83.9 FL (ref 80–94)
MONOCYTES # BLD AUTO: 0.56 X10(3)/MCL (ref 0.1–1.3)
MONOCYTES NFR BLD AUTO: 7.6 %
NEUTROPHILS # BLD AUTO: 5.4 X10(3)/MCL (ref 2.1–9.2)
NEUTROPHILS NFR BLD AUTO: 73.3 %
PLATELET # BLD AUTO: 268 X10(3)/MCL (ref 130–400)
PMV BLD AUTO: 10.2 FL (ref 9.4–12.4)
POTASSIUM SERPL-SCNC: 4.8 MMOL/L (ref 3.5–5.1)
PROT SERPL-MCNC: 7.5 GM/DL (ref 6.4–8.3)
RBC # BLD AUTO: 4.11 X10(6)/MCL (ref 4.7–6.1)
SODIUM SERPL-SCNC: 137 MMOL/L (ref 136–145)
WBC # SPEC AUTO: 7.4 X10(3)/MCL (ref 4.5–11.5)

## 2022-06-22 PROCEDURE — 99214 OFFICE O/P EST MOD 30 MIN: CPT | Mod: S$GLB,,, | Performed by: INTERNAL MEDICINE

## 2022-06-22 PROCEDURE — 99999 PR PBB SHADOW E&M-EST. PATIENT-LVL V: CPT | Mod: PBBFAC,,, | Performed by: INTERNAL MEDICINE

## 2022-06-22 PROCEDURE — 99999 PR PBB SHADOW E&M-EST. PATIENT-LVL V: ICD-10-PCS | Mod: PBBFAC,,, | Performed by: INTERNAL MEDICINE

## 2022-06-22 PROCEDURE — 80053 COMPREHEN METABOLIC PANEL: CPT

## 2022-06-22 PROCEDURE — 85025 COMPLETE CBC W/AUTO DIFF WBC: CPT

## 2022-06-22 PROCEDURE — 36415 COLL VENOUS BLD VENIPUNCTURE: CPT

## 2022-06-22 PROCEDURE — 99214 PR OFFICE/OUTPT VISIT, EST, LEVL IV, 30-39 MIN: ICD-10-PCS | Mod: S$GLB,,, | Performed by: INTERNAL MEDICINE

## 2022-06-22 NOTE — PROGRESS NOTES
Subjective:       Patient ID: Nicholas Vera is a 56 y.o. male.    Chief Complaint: Follow-up (Patient stated no new problems )  Tired and anemic    Diagnosis:  1. Normocytic Anemia  2. Iron deficiency anemia  3. Acute blood loss anemia  4. CKD stage 5    Current Treatment:   1. Pending work-up    Treatment History:  1. Blood transfusions     Follow-up  Associated symptoms include fatigue. Pertinent negatives include no abdominal pain, chest pain, coughing, headaches or rash.      HPI:  Patient with coronary artery disease, CKD stage 5 (follows with Dr. Guido Lozada), diabetes type 2, hypertension, TIA who noticed blood per rectum in February 2022. He underwent a colonoscopy on 02/18/2022, unfortunately had a poor prep.  Repeat was done on 02/25/2022, also with poor prep.  The patient then had another colonoscopy on 03/14/2022 that revealed nonbleeding internal hemorrhoids, multiple small mouth diverticula in the sigmoid colon without evidence of bleed.  There are multiple polyps and there was a single solitary 15 mm ulcer in the transverse colon with stigmata of recent bleeding.  Three hemostatic clips were successfully placed.  Multiple biopsies were taken and these all returned benign.  The patient also had an EGD on 03/14/2022 that showed no evidence of bleeding.  Patient required multiple blood transfusions.  He was found to be iron deficient.  He also was found to have significantly elevated serum creatinine.  He was referred to Hematology for further evaluation of anemia.  I initially saw the patient on 05/25/2022.  He states he had very significant fatigue.  Blood work done on that day suggestive of anemia of chronic kidney disease.    Interval History:   Patient presents to clinic for schedule follow up appointment.  Overall, he is doing okay.  He did have a little drop in his blood work in between his last visit with me and today, this has corrected, but when it dropped, he felt fatigued.      Past  Medical History:   Diagnosis Date    Acid reflux     Anxiety     Arthritis of left shoulder region     Atrial fibrillation     CAD (coronary artery disease)     Carpal tunnel syndrome     Cervical radiculopathy     Cervical spondylosis with myelopathy     Cervicalgia     COPD (chronic obstructive pulmonary disease)     CVA (cerebral vascular accident)     DM (diabetes mellitus)     GERD (gastroesophageal reflux disease)     High cholesterol     History of kidney problems     HTN (hypertension)     Hyperlipidemia     Sleep apnea     Spinal stenosis in cervical region     TIA (transient ischemic attack)     Type 2 diabetes mellitus       Past Surgical History:   Procedure Laterality Date    ANTERIOR CERVICAL DISCECTOMY W/ FUSION  11/26/2021    C5-6, C6-7 ACDF- Dr. Jett    CARPAL TUNNEL RELEASE      bilateral CTR & left UND- 2004 & 2020    CARPAL TUNNEL RELEASE Bilateral     CERVICAL FUSION      COLONOSCOPY      CORONARY ANGIOPLASTY WITH STENT PLACEMENT      CORONARY STENT PLACEMENT  2019 1 vessel    ELBOW SURGERY Right     ELBOW SURGERY Left     EXCISION OF EXTERNAL EAR      FUNCTIONAL ENDOSCOPIC SINUS SURGERY (FESS)      INGUINAL HERNIA REPAIR      LUMBAR LAMINECTOMY      MICRODISCECTOMY OF SPINE  04/17/2015    L4-5. L5-S1 decompression; left L4-5 microdiscectomy- Dr. Amaro    MYRINGOTOMY W/ TUBES      NASAL SEPTOPLASTY      NEUROPLASTY      SINUS ENDOSCOPY      TONSILLECTOMY, ADENOIDECTOMY      VASECTOMY       Social History     Socioeconomic History    Marital status:    Tobacco Use    Smoking status: Former Smoker     Types: Cigarettes    Smokeless tobacco: Never Used   Substance and Sexual Activity    Alcohol use: Never    Drug use: Never   Social History Narrative    ** Merged History Encounter **           Family History   Problem Relation Age of Onset    Multiple sclerosis Daughter       Review of patient's allergies indicates:  No Known Allergies    Review of Systems   Constitutional: Positive for fatigue. Negative for appetite change and unexpected weight change.   HENT: Negative for mouth sores.    Eyes: Negative for visual disturbance.   Respiratory: Positive for shortness of breath. Negative for cough.    Cardiovascular: Negative for chest pain.   Gastrointestinal: Negative for abdominal pain and diarrhea.   Genitourinary: Negative for frequency.   Musculoskeletal: Negative for back pain.   Integumentary:  Negative for rash.   Neurological: Negative for headaches.   Hematological: Negative for adenopathy.   Psychiatric/Behavioral: The patient is not nervous/anxious.          Objective:      Physical Exam  Vitals reviewed.   Constitutional:       General: He is awake.      Appearance: Normal appearance.   HENT:      Head: Normocephalic and atraumatic.      Right Ear: Hearing normal.      Left Ear: Hearing normal.      Nose: Nose normal.   Eyes:      General: Lids are normal. Vision grossly intact.      Extraocular Movements: Extraocular movements intact.      Conjunctiva/sclera: Conjunctivae normal.   Cardiovascular:      Rate and Rhythm: Normal rate and regular rhythm.      Pulses: Normal pulses.      Heart sounds: Normal heart sounds.   Pulmonary:      Effort: Pulmonary effort is normal.      Breath sounds: Normal breath sounds. No wheezing, rhonchi or rales.   Chest:   Breasts:      Right: No axillary adenopathy or supraclavicular adenopathy.      Left: No axillary adenopathy or supraclavicular adenopathy.       Abdominal:      General: Bowel sounds are normal.      Palpations: Abdomen is soft.      Tenderness: There is no abdominal tenderness.   Musculoskeletal:      Cervical back: Full passive range of motion without pain and normal range of motion.      Right lower leg: No edema.      Left lower leg: No edema.   Lymphadenopathy:      Cervical: No cervical adenopathy.      Upper Body:      Right upper body: No supraclavicular or axillary adenopathy.       Left upper body: No supraclavicular or axillary adenopathy.   Skin:     General: Skin is warm.   Neurological:      General: No focal deficit present.      Mental Status: He is alert and oriented to person, place, and time.   Psychiatric:         Attention and Perception: Attention normal.         Mood and Affect: Mood and affect normal.         Behavior: Behavior is cooperative.         LABS AND IMAGING REVIEWED IN EPIC          Assessment:   1. Normocytic Anemia  2. Iron deficiency anemia  3. Acute blood loss anemia  4. CKD stage 5        Plan:       Patient has normocytic anemia.  Please note, he also has iron deficiency and CKD 5.  He will likely need hemodialysis in the near future.    Erythropoietin level inappropriately normal.  We will set him up for erythropoietin stimulating agent as needed for hemoglobin of less than 10.     Serum protein electrophoresis and immunofixation does not show any evidence of an M spike.  Immunoglobulins are within normal limits.  Elevated kappa/lambda ratio most likely from chronic kidney disease.    I did discuss the possibility of a bone marrow biopsy with the patient.  I do not think it is necessary at this time, but explained that it might be something we should consider in the future.  Patient okay holding off at this time.    Plan to start monthly procrit injections     Return to clinic in 3 months with repeat CBC and CMP    All questions were answered to the patient to the best of my ability and he understands the plan moving forward.    Miguelito Pineda II, MD

## 2022-07-08 ENCOUNTER — LAB VISIT (OUTPATIENT)
Dept: LAB | Facility: HOSPITAL | Age: 57
End: 2022-07-08
Attending: INTERNAL MEDICINE
Payer: COMMERCIAL

## 2022-07-08 ENCOUNTER — INFUSION (OUTPATIENT)
Dept: INFUSION THERAPY | Facility: HOSPITAL | Age: 57
End: 2022-07-08
Attending: INTERNAL MEDICINE
Payer: COMMERCIAL

## 2022-07-08 VITALS
TEMPERATURE: 98 F | SYSTOLIC BLOOD PRESSURE: 154 MMHG | OXYGEN SATURATION: 94 % | DIASTOLIC BLOOD PRESSURE: 54 MMHG | BODY MASS INDEX: 33.47 KG/M2 | WEIGHT: 226 LBS | HEART RATE: 66 BPM | HEIGHT: 69 IN

## 2022-07-08 DIAGNOSIS — D63.1 ANEMIA DUE TO STAGE 5 CHRONIC KIDNEY DISEASE, NOT ON CHRONIC DIALYSIS: ICD-10-CM

## 2022-07-08 DIAGNOSIS — N18.5 ANEMIA DUE TO STAGE 5 CHRONIC KIDNEY DISEASE, NOT ON CHRONIC DIALYSIS: ICD-10-CM

## 2022-07-08 DIAGNOSIS — N18.5 ANEMIA DUE TO STAGE 5 CHRONIC KIDNEY DISEASE, NOT ON CHRONIC DIALYSIS: Primary | ICD-10-CM

## 2022-07-08 DIAGNOSIS — D63.1 ANEMIA DUE TO STAGE 5 CHRONIC KIDNEY DISEASE, NOT ON CHRONIC DIALYSIS: Primary | ICD-10-CM

## 2022-07-08 LAB
BASOPHILS # BLD AUTO: 0.09 X10(3)/MCL (ref 0–0.2)
BASOPHILS NFR BLD AUTO: 1 %
EOSINOPHIL # BLD AUTO: 0.29 X10(3)/MCL (ref 0–0.9)
EOSINOPHIL NFR BLD AUTO: 3.3 %
ERYTHROCYTE [DISTWIDTH] IN BLOOD BY AUTOMATED COUNT: 15.1 % (ref 11.5–17)
HCT VFR BLD AUTO: 33.1 % (ref 42–52)
HGB BLD-MCNC: 10.5 GM/DL (ref 14–18)
IMM GRANULOCYTES # BLD AUTO: 0.03 X10(3)/MCL (ref 0–0.04)
IMM GRANULOCYTES NFR BLD AUTO: 0.3 %
LYMPHOCYTES # BLD AUTO: 1.28 X10(3)/MCL (ref 0.6–4.6)
LYMPHOCYTES NFR BLD AUTO: 14.3 %
MCH RBC QN AUTO: 26.4 PG (ref 27–31)
MCHC RBC AUTO-ENTMCNC: 31.7 MG/DL (ref 33–36)
MCV RBC AUTO: 83.4 FL (ref 80–94)
MONOCYTES # BLD AUTO: 0.62 X10(3)/MCL (ref 0.1–1.3)
MONOCYTES NFR BLD AUTO: 7 %
NEUTROPHILS # BLD AUTO: 6.6 X10(3)/MCL (ref 2.1–9.2)
NEUTROPHILS NFR BLD AUTO: 74.1 %
PLATELET # BLD AUTO: 269 X10(3)/MCL (ref 130–400)
PMV BLD AUTO: 9.9 FL (ref 7.4–10.4)
RBC # BLD AUTO: 3.97 X10(6)/MCL (ref 4.7–6.1)
WBC # SPEC AUTO: 8.9 X10(3)/MCL (ref 4.5–11.5)

## 2022-07-08 PROCEDURE — 85025 COMPLETE CBC W/AUTO DIFF WBC: CPT

## 2022-07-08 PROCEDURE — 63600175 PHARM REV CODE 636 W HCPCS: Mod: JG | Performed by: INTERNAL MEDICINE

## 2022-07-08 PROCEDURE — 36415 COLL VENOUS BLD VENIPUNCTURE: CPT

## 2022-07-08 PROCEDURE — 96372 THER/PROPH/DIAG INJ SC/IM: CPT

## 2022-07-08 RX ADMIN — EPOETIN ALFA-EPBX 20000 UNITS: 20000 INJECTION, SOLUTION INTRAVENOUS; SUBCUTANEOUS at 02:07

## 2022-07-08 NOTE — PLAN OF CARE
Provide a safe, barrier-free environment that encourages independent activity.  Keep care area uncluttered and well-lighted.  Determine need for increased observation or monitoring.  Avoid use of devices that minimize mobility, such as restraints or indwelling urinary catheter.

## 2022-07-11 NOTE — PROGRESS NOTES
Subjective:       Patient ID: Nicholas Vera is a 56 y.o. male.    Chief Complaint: Follow-up (Patient stated no new problems )  Tired and anemic    Diagnosis:  1. Normocytic Anemia  2. Iron deficiency anemia  3. Acute blood loss anemia  4. CKD stage 5    Current Treatment:   1. Pending work-up    Treatment History:  1. Blood transfusions     Follow-up  Associated symptoms include fatigue. Pertinent negatives include no abdominal pain, chest pain, coughing, headaches or rash.      HPI:  Patient with coronary artery disease, CKD stage 5 (follows with Dr. Guido Lozada), diabetes type 2, hypertension, TIA who noticed blood per rectum in February 2022. He underwent a colonoscopy on 02/18/2022, unfortunately had a poor prep.  Repeat was done on 02/25/2022, also with poor prep.  The patient then had another colonoscopy on 03/14/2022 that revealed nonbleeding internal hemorrhoids, multiple small mouth diverticula in the sigmoid colon without evidence of bleed.  There are multiple polyps and there was a single solitary 15 mm ulcer in the transverse colon with stigmata of recent bleeding.  Three hemostatic clips were successfully placed.  Multiple biopsies were taken and these all returned benign.  The patient also had an EGD on 03/14/2022 that showed no evidence of bleeding.  Patient required multiple blood transfusions.  He was found to be iron deficient.  He also was found to have significantly elevated serum creatinine.  He was referred to Hematology for further evaluation of anemia.  I initially saw the patient on 05/25/2022.  He states he had very significant fatigue.  Blood work done on that day suggestive of anemia of chronic kidney disease.    Interval History:   Patient presents to clinic for schedule follow up appointment.  He states that when his hemoglobin drops into the 10s, he does become pretty symptomatic.  He is also getting his AV fistula placed on 08/09/2022.  He has been talked to about possible renal  transplant.      Past Medical History:   Diagnosis Date    Acid reflux     Anxiety     Arthritis of left shoulder region     Atrial fibrillation     CAD (coronary artery disease)     Carpal tunnel syndrome     Cervical radiculopathy     Cervical spondylosis with myelopathy     Cervicalgia     COPD (chronic obstructive pulmonary disease)     CVA (cerebral vascular accident)     DM (diabetes mellitus)     GERD (gastroesophageal reflux disease)     High cholesterol     History of kidney problems     HTN (hypertension)     Hyperlipidemia     Sleep apnea     Spinal stenosis in cervical region     TIA (transient ischemic attack)     Type 2 diabetes mellitus       Past Surgical History:   Procedure Laterality Date    ANTERIOR CERVICAL DISCECTOMY W/ FUSION  11/26/2021    C5-6, C6-7 ACDF- Dr. Jett    CARPAL TUNNEL RELEASE      bilateral CTR & left UND- 2004 & 2020    CARPAL TUNNEL RELEASE Bilateral     CERVICAL FUSION      COLONOSCOPY      CORONARY ANGIOPLASTY WITH STENT PLACEMENT      CORONARY STENT PLACEMENT  2019 1 vessel    ELBOW SURGERY Right     ELBOW SURGERY Left     EXCISION OF EXTERNAL EAR      FUNCTIONAL ENDOSCOPIC SINUS SURGERY (FESS)      INGUINAL HERNIA REPAIR      LUMBAR LAMINECTOMY      MICRODISCECTOMY OF SPINE  04/17/2015    L4-5. L5-S1 decompression; left L4-5 microdiscectomy- Dr. Amaro    MYRINGOTOMY W/ TUBES      NASAL SEPTOPLASTY      NEUROPLASTY      SINUS ENDOSCOPY      TONSILLECTOMY, ADENOIDECTOMY      VASECTOMY       Social History     Socioeconomic History    Marital status:    Tobacco Use    Smoking status: Former Smoker     Types: Cigarettes    Smokeless tobacco: Never Used   Substance and Sexual Activity    Alcohol use: Never    Drug use: Never   Social History Narrative    ** Merged History Encounter **           Family History   Problem Relation Age of Onset    Multiple sclerosis Daughter       Review of patient's allergies  indicates:  No Known Allergies   Review of Systems   Constitutional: Positive for fatigue. Negative for appetite change and unexpected weight change.   HENT: Negative for mouth sores.    Eyes: Negative for visual disturbance.   Respiratory: Positive for shortness of breath. Negative for cough.    Cardiovascular: Negative for chest pain.   Gastrointestinal: Negative for abdominal pain and diarrhea.   Genitourinary: Negative for frequency.   Musculoskeletal: Negative for back pain.   Integumentary:  Negative for rash.   Neurological: Negative for headaches.   Hematological: Negative for adenopathy.   Psychiatric/Behavioral: The patient is not nervous/anxious.          Objective:      Physical Exam  Vitals reviewed.   Constitutional:       General: He is awake.      Appearance: Normal appearance.   HENT:      Head: Normocephalic and atraumatic.      Right Ear: Hearing normal.      Left Ear: Hearing normal.      Nose: Nose normal.   Eyes:      General: Lids are normal. Vision grossly intact.      Extraocular Movements: Extraocular movements intact.      Conjunctiva/sclera: Conjunctivae normal.   Cardiovascular:      Rate and Rhythm: Normal rate and regular rhythm.      Pulses: Normal pulses.      Heart sounds: Normal heart sounds.   Pulmonary:      Effort: Pulmonary effort is normal.      Breath sounds: Normal breath sounds. No wheezing, rhonchi or rales.   Chest:   Breasts:      Right: No axillary adenopathy or supraclavicular adenopathy.      Left: No axillary adenopathy or supraclavicular adenopathy.       Abdominal:      General: Bowel sounds are normal.      Palpations: Abdomen is soft.      Tenderness: There is no abdominal tenderness.   Musculoskeletal:      Cervical back: Full passive range of motion without pain and normal range of motion.      Right lower leg: No edema.      Left lower leg: No edema.   Lymphadenopathy:      Cervical: No cervical adenopathy.      Upper Body:      Right upper body: No  supraclavicular or axillary adenopathy.      Left upper body: No supraclavicular or axillary adenopathy.   Skin:     General: Skin is warm.   Neurological:      General: No focal deficit present.      Mental Status: He is alert and oriented to person, place, and time.   Psychiatric:         Attention and Perception: Attention normal.         Mood and Affect: Mood and affect normal.         Behavior: Behavior is cooperative.         LABS AND IMAGING REVIEWED IN EPIC          Assessment:     1. Normocytic Anemia  2. Iron deficiency anemia  3. Acute blood loss anemia  4. CKD stage 5        Plan:       Patient has normocytic anemia.  Please note, he also has iron deficiency and CKD 5.  He will likely need hemodialysis in the near future.  Getting his AV fistula placed on 08/09/2022.    Erythropoietin level inappropriately normal.  We will set him up for erythropoietin stimulating agent as needed for hemoglobin of less than 11.5 per protocol.     Serum protein electrophoresis and immunofixation does not show any evidence of an M spike.  Immunoglobulins are within normal limits.  Elevated kappa/lambda ratio most likely from chronic kidney disease.    Return to clinic in 3 months with repeat CBC and CMP    All questions were answered to the patient to the best of my ability and he understands the plan moving forward.    Miguelito Pineda II, MD

## 2022-07-12 ENCOUNTER — OFFICE VISIT (OUTPATIENT)
Dept: HEMATOLOGY/ONCOLOGY | Facility: CLINIC | Age: 57
End: 2022-07-12
Payer: COMMERCIAL

## 2022-07-12 VITALS
TEMPERATURE: 98 F | OXYGEN SATURATION: 96 % | SYSTOLIC BLOOD PRESSURE: 166 MMHG | BODY MASS INDEX: 33.73 KG/M2 | HEART RATE: 69 BPM | WEIGHT: 227.75 LBS | RESPIRATION RATE: 18 BRPM | HEIGHT: 69 IN | DIASTOLIC BLOOD PRESSURE: 74 MMHG

## 2022-07-12 DIAGNOSIS — D63.1 ANEMIA DUE TO STAGE 5 CHRONIC KIDNEY DISEASE, NOT ON CHRONIC DIALYSIS: Primary | ICD-10-CM

## 2022-07-12 DIAGNOSIS — D50.0 IRON DEFICIENCY ANEMIA DUE TO CHRONIC BLOOD LOSS: ICD-10-CM

## 2022-07-12 DIAGNOSIS — N18.5 ANEMIA DUE TO STAGE 5 CHRONIC KIDNEY DISEASE, NOT ON CHRONIC DIALYSIS: Primary | ICD-10-CM

## 2022-07-12 PROCEDURE — 99999 PR PBB SHADOW E&M-EST. PATIENT-LVL V: ICD-10-PCS | Mod: PBBFAC,,, | Performed by: INTERNAL MEDICINE

## 2022-07-12 PROCEDURE — 99999 PR PBB SHADOW E&M-EST. PATIENT-LVL V: CPT | Mod: PBBFAC,,, | Performed by: INTERNAL MEDICINE

## 2022-07-12 PROCEDURE — 99213 PR OFFICE/OUTPT VISIT, EST, LEVL III, 20-29 MIN: ICD-10-PCS | Mod: S$GLB,,, | Performed by: INTERNAL MEDICINE

## 2022-07-12 PROCEDURE — 99213 OFFICE O/P EST LOW 20 MIN: CPT | Mod: S$GLB,,, | Performed by: INTERNAL MEDICINE

## 2022-07-13 DIAGNOSIS — N18.4 CKD (CHRONIC KIDNEY DISEASE) STAGE 4, GFR 15-29 ML/MIN: Primary | ICD-10-CM

## 2022-07-13 LAB
APPEARANCE UR: CLEAR
BACTERIA #/AREA URNS AUTO: ABNORMAL /HPF
BILIRUB UR QL STRIP.AUTO: NEGATIVE MG/DL
COLOR UR AUTO: ABNORMAL
CREAT UR-MCNC: 45.5 MG/DL (ref 63–166)
CREAT UR-MCNC: 45.6 MG/DL (ref 63–166)
GLUCOSE UR QL STRIP.AUTO: >=1000 MG/DL
GRAN CASTS URNS QL MICRO: ABNORMAL /HPF
KETONES UR QL STRIP.AUTO: NEGATIVE MG/DL
LEUKOCYTE ESTERASE UR QL STRIP.AUTO: NEGATIVE UNIT/L
MUCOUS THREADS URNS QL MICRO: ABNORMAL /LPF
NITRITE UR QL STRIP.AUTO: NEGATIVE
PH UR STRIP.AUTO: 5.5 [PH]
PROT UR QL STRIP.AUTO: 100 MG/DL
PROT UR STRIP-MCNC: 188.8 MG/DL
PROT UR STRIP-MCNC: 189.1 MG/DL
PROT/CREAT UR-RTO: 4149.5 MG/GM CR
RBC #/AREA URNS AUTO: ABNORMAL /HPF
RBC UR QL AUTO: NEGATIVE UNIT/L
SP GR UR STRIP.AUTO: 1.02
SQUAMOUS #/AREA URNS AUTO: ABNORMAL /HPF
UROBILINOGEN UR STRIP-ACNC: 0.2 MG/DL
WBC #/AREA URNS AUTO: ABNORMAL /HPF

## 2022-07-13 PROCEDURE — 82570 ASSAY OF URINE CREATININE: CPT | Performed by: INTERNAL MEDICINE

## 2022-07-13 PROCEDURE — 81001 URINALYSIS AUTO W/SCOPE: CPT | Performed by: INTERNAL MEDICINE

## 2022-07-13 PROCEDURE — 82042 OTHER SOURCE ALBUMIN QUAN EA: CPT | Performed by: INTERNAL MEDICINE

## 2022-07-20 ENCOUNTER — OFFICE VISIT (OUTPATIENT)
Dept: NEPHROLOGY | Facility: CLINIC | Age: 57
End: 2022-07-20
Payer: COMMERCIAL

## 2022-07-20 VITALS
TEMPERATURE: 97 F | RESPIRATION RATE: 20 BRPM | WEIGHT: 230.63 LBS | HEIGHT: 68 IN | OXYGEN SATURATION: 94 % | DIASTOLIC BLOOD PRESSURE: 70 MMHG | HEART RATE: 62 BPM | BODY MASS INDEX: 34.95 KG/M2 | SYSTOLIC BLOOD PRESSURE: 120 MMHG

## 2022-07-20 DIAGNOSIS — N18.4 CKD (CHRONIC KIDNEY DISEASE) STAGE 4, GFR 15-29 ML/MIN: Primary | ICD-10-CM

## 2022-07-20 DIAGNOSIS — D63.1 ANEMIA IN CHRONIC KIDNEY DISEASE, UNSPECIFIED CKD STAGE: ICD-10-CM

## 2022-07-20 DIAGNOSIS — N18.9 ANEMIA IN CHRONIC KIDNEY DISEASE, UNSPECIFIED CKD STAGE: ICD-10-CM

## 2022-07-20 DIAGNOSIS — I10 HYPERTENSION, UNSPECIFIED TYPE: ICD-10-CM

## 2022-07-20 PROCEDURE — 99999 PR PBB SHADOW E&M-EST. PATIENT-LVL V: ICD-10-PCS | Mod: PBBFAC,,, | Performed by: INTERNAL MEDICINE

## 2022-07-20 PROCEDURE — 99999 PR PBB SHADOW E&M-EST. PATIENT-LVL V: CPT | Mod: PBBFAC,,, | Performed by: INTERNAL MEDICINE

## 2022-07-20 PROCEDURE — 99214 PR OFFICE/OUTPT VISIT, EST, LEVL IV, 30-39 MIN: ICD-10-PCS | Mod: S$GLB,,, | Performed by: INTERNAL MEDICINE

## 2022-07-20 PROCEDURE — 99214 OFFICE O/P EST MOD 30 MIN: CPT | Mod: S$GLB,,, | Performed by: INTERNAL MEDICINE

## 2022-07-20 RX ORDER — SODIUM BICARBONATE 650 MG/1
1300 TABLET ORAL 2 TIMES DAILY
Qty: 180 TABLET | Refills: 3 | Status: SHIPPED | OUTPATIENT
Start: 2022-07-20 | End: 2024-02-15

## 2022-07-20 NOTE — PROGRESS NOTES
[facility] [place]      HPI  Nicholas Vera, 56 y.o. male,  has a past medical history of Acid reflux, Anxiety, Arthritis of left shoulder region, Atrial fibrillation, CAD (coronary artery disease), Carpal tunnel syndrome, Cervical radiculopathy, Cervical spondylosis with myelopathy, Cervicalgia, COPD (chronic obstructive pulmonary disease), CVA (cerebral vascular accident), DM (diabetes mellitus), GERD (gastroesophageal reflux disease), High cholesterol, History of kidney problems, HTN (hypertension), Hyperlipidemia, Sleep apnea, Spinal stenosis in cervical region, TIA (transient ischemic attack), and Type 2 diabetes mellitus. Nicholas Vera presents to office for a 5 week follow up visit for CKD4 related to diabetic nephropathy and nephrosclerosis. His renal indices fluctuate with cardiac output and diuresis. His most recent Cr is up to 4.45. He is scheduled for AVF August 9th 2022. He is following low sodium diet and low K diet.    On epogen once monthly by Dr. Pineda for Hgb <11    Patient denies taking NSAIDs, new antibiotics or recreational drugs. Denies recent episode of dehydration, diarrhea, vomiting, acute illness, hospitalization, recent angiograms or exposure to IV radiocontrast.      Medical History:   Past Medical History:   Diagnosis Date    Acid reflux     Anxiety     Arthritis of left shoulder region     Atrial fibrillation     CAD (coronary artery disease)     Carpal tunnel syndrome     Cervical radiculopathy     Cervical spondylosis with myelopathy     Cervicalgia     COPD (chronic obstructive pulmonary disease)     CVA (cerebral vascular accident)     DM (diabetes mellitus)     GERD (gastroesophageal reflux disease)     High cholesterol     History of kidney problems     HTN (hypertension)     Hyperlipidemia     Sleep apnea     Spinal stenosis in cervical region     TIA (transient ischemic attack)     Type 2 diabetes mellitus        Surgical History:    Past Surgical History:   Procedure Laterality Date    ANTERIOR CERVICAL DISCECTOMY W/ FUSION  11/26/2021    C5-6, C6-7 ACDF- Dr. Jett    CARPAL TUNNEL RELEASE      bilateral CTR & left UND- 2004 & 2020    CARPAL TUNNEL RELEASE Bilateral     CERVICAL FUSION      COLONOSCOPY      CORONARY ANGIOPLASTY WITH STENT PLACEMENT      CORONARY STENT PLACEMENT  2019 1 vessel    ELBOW SURGERY Right     ELBOW SURGERY Left     EXCISION OF EXTERNAL EAR      FUNCTIONAL ENDOSCOPIC SINUS SURGERY (FESS)      INGUINAL HERNIA REPAIR      LUMBAR LAMINECTOMY      MICRODISCECTOMY OF SPINE  04/17/2015    L4-5. L5-S1 decompression; left L4-5 microdiscectomy- Dr. Amaro    MYRINGOTOMY W/ TUBES      NASAL SEPTOPLASTY      NEUROPLASTY      SINUS ENDOSCOPY      TONSILLECTOMY, ADENOIDECTOMY      VASECTOMY         Family History:   Family History   Problem Relation Age of Onset    Multiple sclerosis Daughter        Social History:   Social History     Tobacco Use    Smoking status: Former Smoker     Types: Cigarettes    Smokeless tobacco: Never Used   Substance Use Topics    Alcohol use: Never       Allergies:  Review of patient's allergies indicates:  No Known Allergies    Medications:    Current Outpatient Medications:     ANORO ELLIPTA 62.5-25 mcg/actuation DsDv, as needed., Disp: , Rfl:     ascorbic acid, vitamin C, (VITAMIN C) 500 MG tablet, Take 500 mg by mouth once daily., Disp: , Rfl:     BASAGLAR KWIKPEN U-100 INSULIN glargine 100 units/mL (3mL) SubQ pen, Inject 25 Units into the skin once daily., Disp: , Rfl:     cholecalciferol, vitamin D3, 1,250 mcg (50,000 unit) capsule, Take 1,250 mcg by mouth once a week., Disp: , Rfl:     clopidogreL (PLAVIX) 75 mg tablet, Take 75 mg by mouth once daily at 6am., Disp: , Rfl:     EScitalopram oxalate (LEXAPRO) 10 MG tablet, Take 10 mg by mouth once daily at 6am., Disp: , Rfl:     FARXIGA 10 mg tablet, Take 10 mg by mouth once daily at 6am., Disp: , Rfl:      fenofibrate (TRICOR) 145 MG tablet, Take 145 mg by mouth once daily at 6am., Disp: , Rfl:     glimepiride (AMARYL) 4 MG tablet, Take 4 mg by mouth 2 (two) times a day., Disp: , Rfl:     isosorbide mononitrate (IMDUR) 30 MG 24 hr tablet, TAKE 1 TABLET BY MOUTH ONCE DAILY, Disp: 90 tablet, Rfl: 0    labetaloL (NORMODYNE) 200 MG tablet, Take 200 mg by mouth 2 (two) times a day., Disp: , Rfl:     multivit-mins/iron/folic/lycop (CENTRUM MEN ORAL), Take 1 tablet by mouth once daily at 6am., Disp: , Rfl:     NIFEdipine (ADALAT CC) 60 MG TbSR, Take by mouth 2 (two) times a day., Disp: , Rfl:     nitroGLYCERIN (NITROSTAT) 0.4 MG SL tablet, as needed., Disp: , Rfl:     pantoprazole (PROTONIX) 40 MG tablet, Take 40 mg by mouth once daily at 6am., Disp: , Rfl:     rosuvastatin (CRESTOR) 20 MG tablet, Take 20 mg by mouth once daily., Disp: , Rfl:     terazosin (HYTRIN) 2 MG capsule, Take 2 mg by mouth 2 (two) times a day., Disp: , Rfl:     torsemide (DEMADEX) 20 MG Tab, Take 40 mg by mouth once daily at 6am., Disp: , Rfl:     TRULICITY 1.5 mg/0.5 mL pen injector, once a week., Disp: , Rfl:     VASCEPA 1 gram Cap, Take 2 tablets by mouth 2 (two) times a day., Disp: , Rfl:     XYOSTED 100 mg/0.5 mL AtIn, Inject into the skin once a week., Disp: , Rfl:        ROS:    Constitutional: Denies fever, fatigue, generalized weakness, night sweats, or acute weight change  Skin: Denies wounds, no rashes, no itching, no new skin lesions  HEENT: Denies acute change in hearing or vision, tinnitus, or dysphagia  Respiratory:  Denies cough, shortness of breath, or wheezing  Cardiovascular: Denies chest pain, palpitations, or swelling  Gastrointestional: Denies abdominal pain, nausea, vomiting, diarrhea, or constipation  Genitourinary: Denies dysuria, hematuria, or incontinence; reports able to empty bladder  Musculoskeletal: Denies myalgias, back pain, decreased ROM or focal weakness  Neurological: Denies headaches, seizures,  "dizziness, paresthesias or weakness  Hematological: Denies unusual bruising or bleeding  Psychiatric: Denies hallucinations, depression, or confusion      Vital Signs:  /70 (BP Location: Right arm, Patient Position: Sitting)   Pulse 62   Temp 97.3 °F (36.3 °C) (Oral)   Resp 20   Ht 5' 8" (1.727 m)   Wt 104.6 kg (230 lb 9.6 oz)   SpO2 (!) 94%   BMI 35.06 kg/m²   Body mass index is 35.06 kg/m².      Physical Exam:    General: no acute distress, awake, alert  Eyes: PERRLA, EOMI, conjunctiva clear, eyelids without swelling  HENT: atraumatic, oropharynx and nasal mucosa patent  Neck: full ROM, no JVD, no thyromegaly or lymphadenopathy  Respiratory: equal, unlabored, clear to auscultation A/P  Cardiovascular: RRR without murmur or rub; radial and pedal pulses felt  Edema: none  Gastrointestinal: soft, non-tender, non-distended; positive bowel sounds; no masses to palpation  Genitourinary: no CVA tenderness upon palpation  Musculoskeletal: full ROM without limitation or discomfort  Integumentary: warm, dry; no rashes, wounds, or skin lesions  Neurological: oriented, appropriate, no acute deficits      Labs:        Component Value Date/Time     07/13/2022 1441     06/22/2022 1507     09/21/2012 1041    K 5.4 (H) 07/13/2022 1441    K 4.8 06/22/2022 1507    K 4.5 09/21/2012 1041     09/21/2012 1041    CO2 19 (L) 07/13/2022 1441    CO2 19 (L) 06/22/2022 1507    CO2 22 (L) 09/21/2012 1041    BUN 52.3 (H) 07/13/2022 1441    BUN 49.2 (H) 06/22/2022 1507    BUN 12 09/21/2012 1041    CREATININE 4.45 (H) 07/13/2022 1441    CREATININE 3.81 (H) 06/22/2022 1507    CREATININE 0.6 09/21/2012 1041    CALCIUM 9.0 07/13/2022 1441    CALCIUM 8.8 06/22/2022 1507    CALCIUM 9.4 09/21/2012 1041    PHOS 4.7 07/13/2022 1441            Component Value Date/Time    WBC 7.9 07/13/2022 1441    WBC 8.9 07/08/2022 1324    WBC 9.26 09/21/2012 1041    HGB 11.2 (L) 07/13/2022 1441    HGB 10.5 (L) 07/08/2022 1324    HGB " 13.4 (L) 09/21/2012 1041    HCT 35.2 (L) 07/13/2022 1441    HCT 33.1 (L) 07/08/2022 1324    HCT 40.2 09/21/2012 1041     07/13/2022 1441     07/08/2022 1324     09/21/2012 1041         Impression:    Patient Active Problem List   Diagnosis    Anemia due to stage 5 chronic kidney disease, not on chronic dialysis    Iron deficiency anemia due to chronic blood loss    Cervical spondylosis with myelopathy     1. CKD (chronic kidney disease) stage 4, GFR 15-29 ml/min     2. Hypertension, unspecified type     3. Anemia in chronic kidney disease, unspecified CKD stage       CKD4 related to diabetic nephropathy and nephrosclerosis. His renal indices fluctuate with cardiac output and diuresis.  HTN controlled  Secondary hyperparathyroidism: monitor PTH closely, may consider calcitriol next visit    Plan:  Low potassium diet  Low sodium diet  Control BP  Control K, acidity of blood and fluid status is main goal until he gets placed on dialysis  AVF placement scheduled  Rx: Sodium bicarb 1300 mg BID  Avoid NSAIDs (Aleve, Mobic, Celebrex, Ibuprofen, Advil, Toradol and Diclofenac).       Guido Lozada MD

## 2022-08-05 ENCOUNTER — INFUSION (OUTPATIENT)
Dept: INFUSION THERAPY | Facility: HOSPITAL | Age: 57
End: 2022-08-05
Attending: INTERNAL MEDICINE
Payer: COMMERCIAL

## 2022-08-05 VITALS
RESPIRATION RATE: 18 BRPM | DIASTOLIC BLOOD PRESSURE: 57 MMHG | OXYGEN SATURATION: 92 % | TEMPERATURE: 98 F | SYSTOLIC BLOOD PRESSURE: 132 MMHG | HEART RATE: 67 BPM

## 2022-08-05 DIAGNOSIS — D63.1 ANEMIA DUE TO STAGE 5 CHRONIC KIDNEY DISEASE, NOT ON CHRONIC DIALYSIS: Primary | ICD-10-CM

## 2022-08-05 DIAGNOSIS — N18.5 ANEMIA DUE TO STAGE 5 CHRONIC KIDNEY DISEASE, NOT ON CHRONIC DIALYSIS: Primary | ICD-10-CM

## 2022-08-05 PROCEDURE — 96372 THER/PROPH/DIAG INJ SC/IM: CPT

## 2022-08-05 PROCEDURE — 63600175 PHARM REV CODE 636 W HCPCS: Mod: JG | Performed by: INTERNAL MEDICINE

## 2022-08-05 RX ADMIN — EPOETIN ALFA-EPBX 20000 UNITS: 20000 INJECTION, SOLUTION INTRAVENOUS; SUBCUTANEOUS at 11:08

## 2022-08-11 ENCOUNTER — TELEPHONE (OUTPATIENT)
Dept: NEPHROLOGY | Facility: CLINIC | Age: 57
End: 2022-08-11
Payer: COMMERCIAL

## 2022-08-11 ENCOUNTER — LAB VISIT (OUTPATIENT)
Dept: LAB | Facility: HOSPITAL | Age: 57
End: 2022-08-11
Attending: INTERNAL MEDICINE
Payer: COMMERCIAL

## 2022-08-11 DIAGNOSIS — D63.1 ANEMIA IN CHRONIC KIDNEY DISEASE, UNSPECIFIED CKD STAGE: ICD-10-CM

## 2022-08-11 DIAGNOSIS — N18.4 CKD (CHRONIC KIDNEY DISEASE) STAGE 4, GFR 15-29 ML/MIN: ICD-10-CM

## 2022-08-11 DIAGNOSIS — N18.5 ANEMIA DUE TO STAGE 5 CHRONIC KIDNEY DISEASE, NOT ON CHRONIC DIALYSIS: ICD-10-CM

## 2022-08-11 DIAGNOSIS — Z12.5 SCREENING PSA (PROSTATE SPECIFIC ANTIGEN): ICD-10-CM

## 2022-08-11 DIAGNOSIS — N18.9 ANEMIA IN CHRONIC KIDNEY DISEASE, UNSPECIFIED CKD STAGE: ICD-10-CM

## 2022-08-11 DIAGNOSIS — N18.4 CKD (CHRONIC KIDNEY DISEASE) STAGE 4, GFR 15-29 ML/MIN: Primary | ICD-10-CM

## 2022-08-11 DIAGNOSIS — D64.9 ANEMIA, UNSPECIFIED TYPE: ICD-10-CM

## 2022-08-11 DIAGNOSIS — I10 HYPERTENSION, UNSPECIFIED TYPE: ICD-10-CM

## 2022-08-11 DIAGNOSIS — D63.1 ANEMIA DUE TO STAGE 5 CHRONIC KIDNEY DISEASE, NOT ON CHRONIC DIALYSIS: ICD-10-CM

## 2022-08-11 DIAGNOSIS — D50.0 IRON DEFICIENCY ANEMIA DUE TO CHRONIC BLOOD LOSS: ICD-10-CM

## 2022-08-11 LAB
ALBUMIN SERPL-MCNC: 3.9 GM/DL (ref 3.5–5)
ALBUMIN/GLOB SERPL: 1 RATIO (ref 1.1–2)
ALP SERPL-CCNC: 53 UNIT/L (ref 40–150)
ALT SERPL-CCNC: 13 UNIT/L (ref 0–55)
AST SERPL-CCNC: 30 UNIT/L (ref 5–34)
BASOPHILS # BLD AUTO: 0.05 X10(3)/MCL (ref 0–0.2)
BASOPHILS NFR BLD AUTO: 0.7 %
BILIRUBIN DIRECT+TOT PNL SERPL-MCNC: 0.6 MG/DL
BUN SERPL-MCNC: 62.3 MG/DL (ref 8.4–25.7)
CALCIUM SERPL-MCNC: 8.8 MG/DL (ref 8.4–10.2)
CHLORIDE SERPL-SCNC: 108 MMOL/L (ref 98–107)
CHOLEST SERPL-MCNC: 106 MG/DL
CHOLEST/HDLC SERPL: 4 {RATIO} (ref 0–5)
CO2 SERPL-SCNC: 20 MMOL/L (ref 22–29)
CREAT SERPL-MCNC: 4.39 MG/DL (ref 0.73–1.18)
EOSINOPHIL # BLD AUTO: 0.28 X10(3)/MCL (ref 0–0.9)
EOSINOPHIL NFR BLD AUTO: 3.7 %
ERYTHROCYTE [DISTWIDTH] IN BLOOD BY AUTOMATED COUNT: 15.8 % (ref 11.5–17)
EST. AVERAGE GLUCOSE BLD GHB EST-MCNC: 122.6 MG/DL
GFR SERPLBLD CREATININE-BSD FMLA CKD-EPI: 15 MLS/MIN/1.73/M2
GLOBULIN SER-MCNC: 3.9 GM/DL (ref 2.4–3.5)
GLUCOSE SERPL-MCNC: 50 MG/DL (ref 74–100)
HBA1C MFR BLD: 5.9 %
HCT VFR BLD AUTO: 32.1 % (ref 42–52)
HDLC SERPL-MCNC: 25 MG/DL (ref 35–60)
HGB BLD-MCNC: 10.3 GM/DL (ref 14–18)
IMM GRANULOCYTES # BLD AUTO: 0.02 X10(3)/MCL (ref 0–0.04)
IMM GRANULOCYTES NFR BLD AUTO: 0.3 %
LDLC SERPL CALC-MCNC: 47 MG/DL (ref 50–140)
LYMPHOCYTES # BLD AUTO: 0.97 X10(3)/MCL (ref 0.6–4.6)
LYMPHOCYTES NFR BLD AUTO: 12.7 %
MCH RBC QN AUTO: 27.4 PG (ref 27–31)
MCHC RBC AUTO-ENTMCNC: 32.1 MG/DL (ref 33–36)
MCV RBC AUTO: 85.4 FL (ref 80–94)
MONOCYTES # BLD AUTO: 0.5 X10(3)/MCL (ref 0.1–1.3)
MONOCYTES NFR BLD AUTO: 6.6 %
NEUTROPHILS # BLD AUTO: 5.8 X10(3)/MCL (ref 2.1–9.2)
NEUTROPHILS NFR BLD AUTO: 76 %
NRBC BLD AUTO-RTO: 0 %
PHOSPHATE SERPL-MCNC: 4.2 MG/DL (ref 2.3–4.7)
PLATELET # BLD AUTO: 269 X10(3)/MCL (ref 130–400)
PMV BLD AUTO: 10.9 FL (ref 7.4–10.4)
POTASSIUM SERPL-SCNC: 4.5 MMOL/L (ref 3.5–5.1)
PROT SERPL-MCNC: 7.8 GM/DL (ref 6.4–8.3)
PSA SERPL-MCNC: 0.81 NG/ML
PTH-INTACT SERPL-MCNC: 251 PG/ML (ref 8.7–77)
RBC # BLD AUTO: 3.76 X10(6)/MCL (ref 4.7–6.1)
SODIUM SERPL-SCNC: 144 MMOL/L (ref 136–145)
TRIGL SERPL-MCNC: 171 MG/DL (ref 34–140)
TSH SERPL-ACNC: 3.84 UIU/ML (ref 0.35–4.94)
VLDLC SERPL CALC-MCNC: 34 MG/DL
WBC # SPEC AUTO: 7.6 X10(3)/MCL (ref 4.5–11.5)

## 2022-08-11 PROCEDURE — 83970 ASSAY OF PARATHORMONE: CPT

## 2022-08-11 PROCEDURE — 84100 ASSAY OF PHOSPHORUS: CPT

## 2022-08-11 PROCEDURE — 84153 ASSAY OF PSA TOTAL: CPT

## 2022-08-11 PROCEDURE — 36415 COLL VENOUS BLD VENIPUNCTURE: CPT

## 2022-08-11 PROCEDURE — 80053 COMPREHEN METABOLIC PANEL: CPT

## 2022-08-11 PROCEDURE — 83036 HEMOGLOBIN GLYCOSYLATED A1C: CPT

## 2022-08-11 PROCEDURE — 85025 COMPLETE CBC W/AUTO DIFF WBC: CPT

## 2022-08-11 PROCEDURE — 80061 LIPID PANEL: CPT

## 2022-08-11 PROCEDURE — 84443 ASSAY THYROID STIM HORMONE: CPT

## 2022-08-11 RX ORDER — TORSEMIDE 20 MG/1
40 TABLET ORAL 2 TIMES DAILY
Qty: 60 TABLET | Refills: 1 | Status: ON HOLD | OUTPATIENT
Start: 2022-08-11 | End: 2022-09-16 | Stop reason: HOSPADM

## 2022-08-11 NOTE — TELEPHONE ENCOUNTER
Patient called regarding 10 pound weight gain in 2 days, Dr Lozada increased Torsemide 40 mg to bid, Patient verbalized understanding.

## 2022-08-15 ENCOUNTER — HOSPITAL ENCOUNTER (INPATIENT)
Facility: HOSPITAL | Age: 57
LOS: 2 days | Discharge: HOME | DRG: 177 | End: 2022-08-17
Attending: EMERGENCY MEDICINE | Admitting: STUDENT IN AN ORGANIZED HEALTH CARE EDUCATION/TRAINING PROGRAM
Payer: COMMERCIAL

## 2022-08-15 DIAGNOSIS — I24.9 ACS (ACUTE CORONARY SYNDROME): ICD-10-CM

## 2022-08-15 DIAGNOSIS — I16.1 HYPERTENSIVE EMERGENCY: ICD-10-CM

## 2022-08-15 DIAGNOSIS — R06.02 SOB (SHORTNESS OF BREATH): ICD-10-CM

## 2022-08-15 DIAGNOSIS — U07.1 COVID-19: Primary | ICD-10-CM

## 2022-08-15 DIAGNOSIS — I50.9 CHF (CONGESTIVE HEART FAILURE): ICD-10-CM

## 2022-08-15 DIAGNOSIS — R07.9 CHEST PAIN: ICD-10-CM

## 2022-08-15 LAB
ALBUMIN SERPL-MCNC: 3.8 GM/DL (ref 3.5–5)
ALBUMIN/GLOB SERPL: 0.9 RATIO (ref 1.1–2)
ALP SERPL-CCNC: 62 UNIT/L (ref 40–150)
ALT SERPL-CCNC: 18 UNIT/L (ref 0–55)
AST SERPL-CCNC: 35 UNIT/L (ref 5–34)
BASOPHILS # BLD AUTO: 0.05 X10(3)/MCL (ref 0–0.2)
BASOPHILS NFR BLD AUTO: 0.9 %
BILIRUBIN DIRECT+TOT PNL SERPL-MCNC: 0.8 MG/DL
BNP BLD-MCNC: 425.4 PG/ML
BUN SERPL-MCNC: 37.9 MG/DL (ref 8.4–25.7)
CALCIUM SERPL-MCNC: 9.6 MG/DL (ref 8.4–10.2)
CHLORIDE SERPL-SCNC: 108 MMOL/L (ref 98–107)
CO2 SERPL-SCNC: 20 MMOL/L (ref 22–29)
CREAT SERPL-MCNC: 4.01 MG/DL (ref 0.73–1.18)
CRP SERPL HS-MCNC: 11.06 MG/L
D DIMER PPP IA.FEU-MCNC: 1.28 UG/ML FEU (ref 0–0.5)
EOSINOPHIL # BLD AUTO: 0.11 X10(3)/MCL (ref 0–0.9)
EOSINOPHIL NFR BLD AUTO: 2.1 %
ERYTHROCYTE [DISTWIDTH] IN BLOOD BY AUTOMATED COUNT: 15.4 % (ref 11.5–17)
ERYTHROCYTE [SEDIMENTATION RATE] IN BLOOD: 75 MM/HR (ref 0–15)
FERRITIN SERPL-MCNC: 180.82 NG/ML (ref 21.81–274.66)
FLUAV AG UPPER RESP QL IA.RAPID: NOT DETECTED
FLUBV AG UPPER RESP QL IA.RAPID: NOT DETECTED
GFR SERPLBLD CREATININE-BSD FMLA CKD-EPI: 17 MLS/MIN/1.73/M2
GLOBULIN SER-MCNC: 4.1 GM/DL (ref 2.4–3.5)
GLUCOSE SERPL-MCNC: 41 MG/DL (ref 74–100)
HCT VFR BLD AUTO: 34.7 % (ref 42–52)
HGB BLD-MCNC: 10.8 GM/DL (ref 14–18)
IMM GRANULOCYTES # BLD AUTO: 0.02 X10(3)/MCL (ref 0–0.04)
IMM GRANULOCYTES NFR BLD AUTO: 0.4 %
LYMPHOCYTES # BLD AUTO: 0.4 X10(3)/MCL (ref 0.6–4.6)
LYMPHOCYTES NFR BLD AUTO: 7.6 %
MCH RBC QN AUTO: 26.9 PG (ref 27–31)
MCHC RBC AUTO-ENTMCNC: 31.1 MG/DL (ref 33–36)
MCV RBC AUTO: 86.5 FL (ref 80–94)
MONOCYTES # BLD AUTO: 0.77 X10(3)/MCL (ref 0.1–1.3)
MONOCYTES NFR BLD AUTO: 14.6 %
NEUTROPHILS # BLD AUTO: 3.9 X10(3)/MCL (ref 2.1–9.2)
NEUTROPHILS NFR BLD AUTO: 74.4 %
NRBC BLD AUTO-RTO: 0 %
PLATELET # BLD AUTO: 278 X10(3)/MCL (ref 130–400)
PMV BLD AUTO: 10.6 FL (ref 7.4–10.4)
POCT GLUCOSE: 104 MG/DL (ref 70–110)
POCT GLUCOSE: 158 MG/DL (ref 70–110)
POTASSIUM SERPL-SCNC: 4.5 MMOL/L (ref 3.5–5.1)
PROT SERPL-MCNC: 7.9 GM/DL (ref 6.4–8.3)
RBC # BLD AUTO: 4.01 X10(6)/MCL (ref 4.7–6.1)
SARS-COV-2 RNA RESP QL NAA+PROBE: DETECTED
SODIUM SERPL-SCNC: 138 MMOL/L (ref 136–145)
TROPONIN I SERPL-MCNC: 0.06 NG/ML (ref 0–0.04)
TROPONIN I SERPL-MCNC: 0.08 NG/ML (ref 0–0.04)
WBC # SPEC AUTO: 5.3 X10(3)/MCL (ref 4.5–11.5)

## 2022-08-15 PROCEDURE — 36415 COLL VENOUS BLD VENIPUNCTURE: CPT | Performed by: INTERNAL MEDICINE

## 2022-08-15 PROCEDURE — 85379 FIBRIN DEGRADATION QUANT: CPT | Performed by: INTERNAL MEDICINE

## 2022-08-15 PROCEDURE — 80053 COMPREHEN METABOLIC PANEL: CPT | Performed by: NURSE PRACTITIONER

## 2022-08-15 PROCEDURE — 85651 RBC SED RATE NONAUTOMATED: CPT | Performed by: INTERNAL MEDICINE

## 2022-08-15 PROCEDURE — 36415 COLL VENOUS BLD VENIPUNCTURE: CPT | Performed by: NURSE PRACTITIONER

## 2022-08-15 PROCEDURE — 93010 ELECTROCARDIOGRAM REPORT: CPT | Mod: ,,, | Performed by: INTERNAL MEDICINE

## 2022-08-15 PROCEDURE — 82962 GLUCOSE BLOOD TEST: CPT

## 2022-08-15 PROCEDURE — 96374 THER/PROPH/DIAG INJ IV PUSH: CPT

## 2022-08-15 PROCEDURE — 25000003 PHARM REV CODE 250: Performed by: NURSE PRACTITIONER

## 2022-08-15 PROCEDURE — 63600175 PHARM REV CODE 636 W HCPCS: Performed by: EMERGENCY MEDICINE

## 2022-08-15 PROCEDURE — 84484 ASSAY OF TROPONIN QUANT: CPT | Performed by: NURSE PRACTITIONER

## 2022-08-15 PROCEDURE — 11000001 HC ACUTE MED/SURG PRIVATE ROOM

## 2022-08-15 PROCEDURE — 83880 ASSAY OF NATRIURETIC PEPTIDE: CPT | Performed by: INTERNAL MEDICINE

## 2022-08-15 PROCEDURE — 82728 ASSAY OF FERRITIN: CPT | Performed by: INTERNAL MEDICINE

## 2022-08-15 PROCEDURE — 93010 EKG 12-LEAD: ICD-10-PCS | Mod: ,,, | Performed by: INTERNAL MEDICINE

## 2022-08-15 PROCEDURE — 25000003 PHARM REV CODE 250: Performed by: INTERNAL MEDICINE

## 2022-08-15 PROCEDURE — 63600175 PHARM REV CODE 636 W HCPCS: Performed by: INTERNAL MEDICINE

## 2022-08-15 PROCEDURE — 84484 ASSAY OF TROPONIN QUANT: CPT | Performed by: INTERNAL MEDICINE

## 2022-08-15 PROCEDURE — 99291 CRITICAL CARE FIRST HOUR: CPT | Mod: 25

## 2022-08-15 PROCEDURE — 25000003 PHARM REV CODE 250: Performed by: EMERGENCY MEDICINE

## 2022-08-15 PROCEDURE — 93005 ELECTROCARDIOGRAM TRACING: CPT

## 2022-08-15 PROCEDURE — 27000207 HC ISOLATION

## 2022-08-15 PROCEDURE — 87636 SARSCOV2 & INF A&B AMP PRB: CPT | Performed by: NURSE PRACTITIONER

## 2022-08-15 PROCEDURE — 85025 COMPLETE CBC W/AUTO DIFF WBC: CPT | Performed by: NURSE PRACTITIONER

## 2022-08-15 PROCEDURE — 86141 C-REACTIVE PROTEIN HS: CPT | Performed by: INTERNAL MEDICINE

## 2022-08-15 RX ORDER — NIFEDIPINE 60 MG/1
60 TABLET, EXTENDED RELEASE ORAL 2 TIMES DAILY
Status: DISCONTINUED | OUTPATIENT
Start: 2022-08-16 | End: 2022-08-17 | Stop reason: HOSPADM

## 2022-08-15 RX ORDER — MAG HYDROX/ALUMINUM HYD/SIMETH 200-200-20
30 SUSPENSION, ORAL (FINAL DOSE FORM) ORAL 4 TIMES DAILY PRN
Status: DISCONTINUED | OUTPATIENT
Start: 2022-08-15 | End: 2022-08-17 | Stop reason: HOSPADM

## 2022-08-15 RX ORDER — LABETALOL HYDROCHLORIDE 5 MG/ML
20 INJECTION, SOLUTION INTRAVENOUS
Status: DISCONTINUED | OUTPATIENT
Start: 2022-08-15 | End: 2022-08-17 | Stop reason: HOSPADM

## 2022-08-15 RX ORDER — TALC
6 POWDER (GRAM) TOPICAL NIGHTLY PRN
Status: DISCONTINUED | OUTPATIENT
Start: 2022-08-15 | End: 2022-08-17 | Stop reason: HOSPADM

## 2022-08-15 RX ORDER — TORSEMIDE 20 MG/1
40 TABLET ORAL 2 TIMES DAILY
Status: DISCONTINUED | OUTPATIENT
Start: 2022-08-16 | End: 2022-08-16

## 2022-08-15 RX ORDER — INSULIN ASPART 100 [IU]/ML
0-5 INJECTION, SOLUTION INTRAVENOUS; SUBCUTANEOUS
Status: DISCONTINUED | OUTPATIENT
Start: 2022-08-15 | End: 2022-08-17 | Stop reason: HOSPADM

## 2022-08-15 RX ORDER — HYDRALAZINE HYDROCHLORIDE 20 MG/ML
10 INJECTION INTRAMUSCULAR; INTRAVENOUS
Status: DISCONTINUED | OUTPATIENT
Start: 2022-08-15 | End: 2022-08-15

## 2022-08-15 RX ORDER — HYDRALAZINE HYDROCHLORIDE 50 MG/1
TABLET, FILM COATED ORAL
Status: ON HOLD | COMMUNITY
Start: 2022-06-25 | End: 2022-08-17 | Stop reason: HOSPADM

## 2022-08-15 RX ORDER — DEXAMETHASONE SODIUM PHOSPHATE 4 MG/ML
6 INJECTION, SOLUTION INTRA-ARTICULAR; INTRALESIONAL; INTRAMUSCULAR; INTRAVENOUS; SOFT TISSUE EVERY 24 HOURS
Status: DISCONTINUED | OUTPATIENT
Start: 2022-08-16 | End: 2022-08-16

## 2022-08-15 RX ORDER — ALBUTEROL SULFATE 90 UG/1
2 AEROSOL, METERED RESPIRATORY (INHALATION) EVERY 6 HOURS PRN
Status: DISCONTINUED | OUTPATIENT
Start: 2022-08-15 | End: 2022-08-17 | Stop reason: HOSPADM

## 2022-08-15 RX ORDER — BENZONATATE 100 MG/1
200 CAPSULE ORAL 3 TIMES DAILY PRN
Status: DISCONTINUED | OUTPATIENT
Start: 2022-08-15 | End: 2022-08-17 | Stop reason: HOSPADM

## 2022-08-15 RX ORDER — LABETALOL 200 MG/1
200 TABLET, FILM COATED ORAL
Status: COMPLETED | OUTPATIENT
Start: 2022-08-15 | End: 2022-08-15

## 2022-08-15 RX ORDER — LABETALOL HYDROCHLORIDE 5 MG/ML
10 INJECTION, SOLUTION INTRAVENOUS
Status: DISCONTINUED | OUTPATIENT
Start: 2022-08-15 | End: 2022-08-15

## 2022-08-15 RX ORDER — ACETAMINOPHEN 325 MG/1
650 TABLET ORAL EVERY 4 HOURS PRN
Status: DISCONTINUED | OUTPATIENT
Start: 2022-08-15 | End: 2022-08-17 | Stop reason: HOSPADM

## 2022-08-15 RX ORDER — HYDRALAZINE HYDROCHLORIDE 50 MG/1
50 TABLET, FILM COATED ORAL
Status: COMPLETED | OUTPATIENT
Start: 2022-08-15 | End: 2022-08-15

## 2022-08-15 RX ORDER — HYDRALAZINE HYDROCHLORIDE 20 MG/ML
20 INJECTION INTRAMUSCULAR; INTRAVENOUS
Status: DISCONTINUED | OUTPATIENT
Start: 2022-08-15 | End: 2022-08-17 | Stop reason: HOSPADM

## 2022-08-15 RX ORDER — POLYETHYLENE GLYCOL 3350 17 G/17G
17 POWDER, FOR SOLUTION ORAL 2 TIMES DAILY PRN
Status: DISCONTINUED | OUTPATIENT
Start: 2022-08-15 | End: 2022-08-17 | Stop reason: HOSPADM

## 2022-08-15 RX ORDER — HYDRALAZINE HYDROCHLORIDE 20 MG/ML
20 INJECTION INTRAMUSCULAR; INTRAVENOUS
Status: COMPLETED | OUTPATIENT
Start: 2022-08-15 | End: 2022-08-15

## 2022-08-15 RX ORDER — ATORVASTATIN CALCIUM 40 MG/1
40 TABLET, FILM COATED ORAL NIGHTLY
Status: DISCONTINUED | OUTPATIENT
Start: 2022-08-16 | End: 2022-08-17 | Stop reason: HOSPADM

## 2022-08-15 RX ORDER — CLOPIDOGREL BISULFATE 75 MG/1
75 TABLET ORAL DAILY
Status: DISCONTINUED | OUTPATIENT
Start: 2022-08-16 | End: 2022-08-17 | Stop reason: HOSPADM

## 2022-08-15 RX ORDER — ONDANSETRON 2 MG/ML
4 INJECTION INTRAMUSCULAR; INTRAVENOUS EVERY 4 HOURS PRN
Status: DISCONTINUED | OUTPATIENT
Start: 2022-08-15 | End: 2022-08-17 | Stop reason: HOSPADM

## 2022-08-15 RX ORDER — IBUPROFEN 200 MG
24 TABLET ORAL
Status: DISCONTINUED | OUTPATIENT
Start: 2022-08-15 | End: 2022-08-17 | Stop reason: HOSPADM

## 2022-08-15 RX ORDER — GUAIFENESIN/DEXTROMETHORPHAN 100-10MG/5
5 SYRUP ORAL EVERY 4 HOURS PRN
Status: DISCONTINUED | OUTPATIENT
Start: 2022-08-15 | End: 2022-08-17 | Stop reason: HOSPADM

## 2022-08-15 RX ORDER — IBUPROFEN 200 MG
16 TABLET ORAL
Status: DISCONTINUED | OUTPATIENT
Start: 2022-08-15 | End: 2022-08-17 | Stop reason: HOSPADM

## 2022-08-15 RX ORDER — DEXAMETHASONE SODIUM PHOSPHATE 4 MG/ML
6 INJECTION, SOLUTION INTRA-ARTICULAR; INTRALESIONAL; INTRAMUSCULAR; INTRAVENOUS; SOFT TISSUE
Status: COMPLETED | OUTPATIENT
Start: 2022-08-15 | End: 2022-08-15

## 2022-08-15 RX ORDER — PANTOPRAZOLE SODIUM 40 MG/1
40 TABLET, DELAYED RELEASE ORAL DAILY
Status: DISCONTINUED | OUTPATIENT
Start: 2022-08-16 | End: 2022-08-17 | Stop reason: HOSPADM

## 2022-08-15 RX ORDER — SODIUM CHLORIDE 0.9 % (FLUSH) 0.9 %
10 SYRINGE (ML) INJECTION
Status: DISCONTINUED | OUTPATIENT
Start: 2022-08-15 | End: 2022-08-17 | Stop reason: HOSPADM

## 2022-08-15 RX ORDER — GLUCAGON 1 MG
1 KIT INJECTION
Status: DISCONTINUED | OUTPATIENT
Start: 2022-08-15 | End: 2022-08-17 | Stop reason: HOSPADM

## 2022-08-15 RX ORDER — LABETALOL 100 MG/1
200 TABLET, FILM COATED ORAL 2 TIMES DAILY
Status: DISCONTINUED | OUTPATIENT
Start: 2022-08-16 | End: 2022-08-17 | Stop reason: HOSPADM

## 2022-08-15 RX ORDER — ESCITALOPRAM OXALATE 10 MG/1
10 TABLET ORAL DAILY
Status: DISCONTINUED | OUTPATIENT
Start: 2022-08-16 | End: 2022-08-17 | Stop reason: HOSPADM

## 2022-08-15 RX ORDER — HYDRALAZINE HYDROCHLORIDE 50 MG/1
100 TABLET, FILM COATED ORAL 3 TIMES DAILY
Status: DISCONTINUED | OUTPATIENT
Start: 2022-08-16 | End: 2022-08-17 | Stop reason: HOSPADM

## 2022-08-15 RX ORDER — DOXAZOSIN 1 MG/1
4 TABLET ORAL DAILY
Status: DISCONTINUED | OUTPATIENT
Start: 2022-08-16 | End: 2022-08-17 | Stop reason: HOSPADM

## 2022-08-15 RX ORDER — AMOXICILLIN 250 MG
1 CAPSULE ORAL 2 TIMES DAILY PRN
Status: DISCONTINUED | OUTPATIENT
Start: 2022-08-15 | End: 2022-08-17 | Stop reason: HOSPADM

## 2022-08-15 RX ORDER — ISOSORBIDE MONONITRATE 30 MG/1
30 TABLET, EXTENDED RELEASE ORAL DAILY
Status: DISCONTINUED | OUTPATIENT
Start: 2022-08-16 | End: 2022-08-17 | Stop reason: HOSPADM

## 2022-08-15 RX ORDER — FUROSEMIDE 10 MG/ML
80 INJECTION INTRAMUSCULAR; INTRAVENOUS ONCE
Status: COMPLETED | OUTPATIENT
Start: 2022-08-15 | End: 2022-08-15

## 2022-08-15 RX ORDER — SODIUM BICARBONATE 650 MG/1
1300 TABLET ORAL 2 TIMES DAILY
Status: DISCONTINUED | OUTPATIENT
Start: 2022-08-16 | End: 2022-08-17 | Stop reason: HOSPADM

## 2022-08-15 RX ORDER — ACETAMINOPHEN 500 MG
1000 TABLET ORAL EVERY 6 HOURS PRN
Status: DISCONTINUED | OUTPATIENT
Start: 2022-08-15 | End: 2022-08-17 | Stop reason: HOSPADM

## 2022-08-15 RX ORDER — CLONIDINE HYDROCHLORIDE 0.2 MG/1
0.2 TABLET ORAL 3 TIMES DAILY PRN
Status: DISCONTINUED | OUTPATIENT
Start: 2022-08-15 | End: 2022-08-17 | Stop reason: HOSPADM

## 2022-08-15 RX ORDER — NIFEDIPINE 60 MG/1
60 TABLET, EXTENDED RELEASE ORAL
Status: COMPLETED | OUTPATIENT
Start: 2022-08-15 | End: 2022-08-15

## 2022-08-15 RX ORDER — SIMETHICONE 80 MG
1 TABLET,CHEWABLE ORAL 4 TIMES DAILY PRN
Status: DISCONTINUED | OUTPATIENT
Start: 2022-08-15 | End: 2022-08-17 | Stop reason: HOSPADM

## 2022-08-15 RX ORDER — PROCHLORPERAZINE EDISYLATE 5 MG/ML
5 INJECTION INTRAMUSCULAR; INTRAVENOUS EVERY 6 HOURS PRN
Status: DISCONTINUED | OUTPATIENT
Start: 2022-08-15 | End: 2022-08-17 | Stop reason: HOSPADM

## 2022-08-15 RX ADMIN — HYDRALAZINE HYDROCHLORIDE 50 MG: 50 TABLET, FILM COATED ORAL at 06:08

## 2022-08-15 RX ADMIN — NIFEDIPINE 60 MG: 60 TABLET, FILM COATED, EXTENDED RELEASE ORAL at 06:08

## 2022-08-15 RX ADMIN — LABETALOL HYDROCHLORIDE 200 MG: 200 TABLET, FILM COATED ORAL at 06:08

## 2022-08-15 RX ADMIN — LABETALOL HYDROCHLORIDE 20 MG: 5 INJECTION, SOLUTION INTRAVENOUS at 10:08

## 2022-08-15 RX ADMIN — FUROSEMIDE 80 MG: 10 INJECTION, SOLUTION INTRAMUSCULAR; INTRAVENOUS at 10:08

## 2022-08-15 RX ADMIN — CLONIDINE HYDROCHLORIDE 0.2 MG: 0.2 TABLET ORAL at 11:08

## 2022-08-15 RX ADMIN — NITROGLYCERIN 2 INCH: 20 OINTMENT TOPICAL at 10:08

## 2022-08-15 RX ADMIN — HYDRALAZINE HYDROCHLORIDE 20 MG: 20 INJECTION INTRAMUSCULAR; INTRAVENOUS at 08:08

## 2022-08-15 RX ADMIN — DEXAMETHASONE SODIUM PHOSPHATE 6 MG: 4 INJECTION, SOLUTION INTRA-ARTICULAR; INTRALESIONAL; INTRAMUSCULAR; INTRAVENOUS; SOFT TISSUE at 06:08

## 2022-08-15 NOTE — FIRST PROVIDER EVALUATION
Medical screening exam completed.  I have conducted a focused provider triage encounter, findings are as follows:  Chief Complaint   Patient presents with    Cough     pT. C/O LOW OXYGEN WHEN AMBULATING.. DX WITH COVID... CAME FROM PCP OFFICE.. AMBULATORY TO TRIAGE WITH STEADY GAIT          Brief history of present illness:  55 y/o male who presents with sob, hypoxia, took covid19 test at [facility] and positive.     Vitals:    08/15/22 1508 08/15/22 1510   BP: (!) 181/73    Pulse: 73    Resp: (!) 26    Temp: 98.2 °F (36.8 °C)    SpO2: (!) 92% (!) 94%       Pertinent physical exam:  Alert, tachypneic, sweaty, ambulated and noticed to have dyspnea with doing so    Brief workup plan:  Ekg, labs, xray    Preliminary workup initiated; this workup will be continued and followed by the physician or advanced practice provider that is assigned to the patient when roomed.

## 2022-08-15 NOTE — ED NOTES
Assumed care, patient presents to the ed with complaints of SOB with ambulating / covid dx. Dx with Covid today, reports some diarrhea. Denies any nausea/vomiting/chestpain. Patient hx of DM, ESRD, new fistula in left arm. On cardiac resp monitor, AAOx4.

## 2022-08-15 NOTE — ED PROVIDER NOTES
Encounter Date: 8/15/2022       History     Chief Complaint   Patient presents with    Cough     pT. C/O LOW OXYGEN WHEN AMBULATING.. DX WITH COVID... CAME FROM PCP OFFICE.. AMBULATORY TO TRIAGE WITH STEADY GAIT      Patient presents with a 2 day history of shortness of breath and cough this is a gentleman with diabetes high blood pressure and stage 4 kidney disease currently anticipating future needed dialysis and that he just had an AV fistula placed couple weeks ago.  Patient states that 2 days ago he started getting flushed and having a cough and a low-grade temp he did a home COVID tests which was weakly positive he went to [facility] and was strongly positive followed up with his primary care via video any looked bad enough to have them telling the present to the emergency room.  Patient states he H very short of breath when he exerts himself minimally no chest pain no nausea no vomiting patient states he has had 2 vaccines in a booster        Review of patient's allergies indicates:  No Known Allergies  Past Medical History:   Diagnosis Date    Acid reflux     Anxiety     Arthritis of left shoulder region     Atrial fibrillation     CAD (coronary artery disease)     Carpal tunnel syndrome     Cervical radiculopathy     Cervical spondylosis with myelopathy     Cervicalgia     COPD (chronic obstructive pulmonary disease)     CVA (cerebral vascular accident)     DM (diabetes mellitus)     GERD (gastroesophageal reflux disease)     High cholesterol     History of kidney problems     HTN (hypertension)     Hyperlipidemia     Sleep apnea     Spinal stenosis in cervical region     TIA (transient ischemic attack)     Type 2 diabetes mellitus      Past Surgical History:   Procedure Laterality Date    ANTERIOR CERVICAL DISCECTOMY W/ FUSION  11/26/2021    C5-6, C6-7 ACDF- Dr. Jett    CARPAL TUNNEL RELEASE      bilateral CTR & left UND- 2004 & 2020    CARPAL TUNNEL RELEASE Bilateral      CERVICAL FUSION      COLONOSCOPY      CORONARY ANGIOPLASTY WITH STENT PLACEMENT      CORONARY STENT PLACEMENT  2019    1 vessel    ELBOW SURGERY Right     ELBOW SURGERY Left     EXCISION OF EXTERNAL EAR      FUNCTIONAL ENDOSCOPIC SINUS SURGERY (FESS)      INGUINAL HERNIA REPAIR      LUMBAR LAMINECTOMY      MICRODISCECTOMY OF SPINE  04/17/2015    L4-5. L5-S1 decompression; left L4-5 microdiscectomy- Dr. Amaro    MYRINGOTOMY W/ TUBES      NASAL SEPTOPLASTY      NEUROPLASTY      SINUS ENDOSCOPY      TONSILLECTOMY, ADENOIDECTOMY      VASECTOMY       Family History   Problem Relation Age of Onset    Multiple sclerosis Daughter      Social History     Tobacco Use    Smoking status: Former Smoker     Types: Cigarettes    Smokeless tobacco: Never Used   Substance Use Topics    Alcohol use: Never    Drug use: Never     Review of Systems   Constitutional: Positive for fatigue and fever.   HENT: Negative for sore throat.    Respiratory: Positive for cough and shortness of breath.    Cardiovascular: Negative for chest pain.   Gastrointestinal: Negative for nausea.   Genitourinary: Negative for dysuria.   Musculoskeletal: Negative for back pain.   Skin: Negative for rash.   Neurological: Negative for weakness.   Hematological: Does not bruise/bleed easily.       Physical Exam     Initial Vitals [08/15/22 1508]   BP Pulse Resp Temp SpO2   (!) 181/73 73 (!) 26 98.2 °F (36.8 °C) (!) 92 %      MAP       --         Physical Exam    Nursing note and vitals reviewed.  Constitutional: He appears well-developed and well-nourished. He is diaphoretic. He appears distressed.   HENT:   Head: Normocephalic and atraumatic.   Eyes: EOM are normal. Pupils are equal, round, and reactive to light.   Neck:   Normal range of motion.  Cardiovascular: Normal rate, regular rhythm, normal heart sounds and intact distal pulses.   Pulmonary/Chest: He is in respiratory distress. He has wheezes. He has rhonchi. He has rales.    Abdominal: Abdomen is soft. Bowel sounds are normal.   Musculoskeletal:         General: Normal range of motion.      Cervical back: Normal range of motion.      Comments: Left arm with ecchymosis     Neurological: He is alert and oriented to person, place, and time. He has normal strength. GCS score is 15. GCS eye subscore is 4. GCS verbal subscore is 5. GCS motor subscore is 6.   Skin: Skin is warm. Capillary refill takes less than 2 seconds.   Psychiatric: He has a normal mood and affect. Judgment normal.         ED Course   Critical Care    Date/Time: 8/15/2022 6:57 PM  Performed by: Indio Garza III, MD  Authorized by: Indio Garza III, MD   Direct patient critical care time: 30 minutes  Documentation critical care time: 5 minutes  Consulting other physicians critical care time: 5 minutes  Total critical care time (exclusive of procedural time) : 40 minutes  Critical care was necessary to treat or prevent imminent or life-threatening deterioration of the following conditions: metabolic crisis.  Critical care was time spent personally by me on the following activities: discussions with primary provider, discussions with consultants, examination of patient, re-evaluation of patient's condition, review of old charts, ordering and review of laboratory studies and ordering and performing treatments and interventions.        Labs Reviewed   CBC WITH DIFFERENTIAL - Abnormal; Notable for the following components:       Result Value    RBC 4.01 (*)     Hgb 10.8 (*)     Hct 34.7 (*)     MCH 26.9 (*)     MCHC 31.1 (*)     MPV 10.6 (*)     Lymph # 0.40 (*)     All other components within normal limits   COMPREHENSIVE METABOLIC PANEL - Abnormal; Notable for the following components:    Chloride 108 (*)     Carbon Dioxide 20 (*)     Glucose Level 41 (*)     Blood Urea Nitrogen 37.9 (*)     Creatinine 4.01 (*)     Globulin 4.1 (*)     Albumin/Globulin Ratio 0.9 (*)     Aspartate Aminotransferase 35 (*)     All  other components within normal limits   TROPONIN I - Abnormal; Notable for the following components:    Troponin-I 0.059 (*)     All other components within normal limits   COVID/FLU A&B PCR - Abnormal; Notable for the following components:    SARS-CoV-2 PCR Detected (*)     All other components within normal limits   POCT GLUCOSE, HAND-HELD DEVICE   POCT GLUCOSE          Imaging Results          X-Ray Chest PA And Lateral (Final result)  Result time 08/15/22 16:47:19    Final result by Josi Lopez MD (08/15/22 16:47:19)                 Impression:      Prominent interstitial markings in the lung bases with trace bilateral pleural effusions.      Electronically signed by: Josi Lopez  Date:    08/15/2022  Time:    16:47             Narrative:    EXAMINATION:  XR CHEST PA AND LATERAL    CLINICAL HISTORY:  sob;    TECHNIQUE:  PA and lateral chest radiographs    COMPARISON:  Chest x-ray dated 05/16/2022    FINDINGS:  The heart is stable in size.  There are prominent interstitial markings in the lung bases without focal consolidation.  There are trace bilateral pleural effusions.  There is no pneumothorax.  There are degenerative changes of the thoracic spine.                                 Medications   dexamethasone injection 6 mg (6 mg Intravenous Given 8/15/22 1815)   labetaloL tablet 200 mg (200 mg Oral Given 8/15/22 1854)   NIFEdipine 24 hr tablet 60 mg (60 mg Oral Given 8/15/22 1854)   hydrALAZINE tablet 50 mg (50 mg Oral Given 8/15/22 1854)     Medical Decision Making:   Clinical Tests:   Lab Tests: Ordered and Reviewed  The following lab test(s) were unremarkable: CBC, CMP and Urinalysis  Radiological Study: Ordered and Reviewed  ED Management:  Patient with felt his blood sugar was low was fed and his blood sugar did turn out to be 40 recheck will keep patient head with oral feeds patient given Decadron for acute COVID-19 also will help with his hyperglycemia patient will be admitted to Hospital  Medicine to evaluate treatment options             ED Course as of 08/15/22 1901   Mon Aug 15, 2022   1819 Pagedelma hospitalist [MS]   1832 Spoke to hospitalist [MS]      ED Course User Index  [MS] Papoallegra Loera             Clinical Impression:   Final diagnoses:  [R06.02] SOB (shortness of breath)  [U07.1] COVID-19 (Primary)  [I24.9] ACS (acute coronary syndrome)          ED Disposition Condition    Admit               Indio Garza III, MD  08/15/22 1901       Indio Garza III, MD  08/15/22 1954

## 2022-08-16 LAB
POCT GLUCOSE: 391 MG/DL (ref 70–110)
POCT GLUCOSE: 475 MG/DL (ref 70–110)
POCT GLUCOSE: 476 MG/DL (ref 70–110)

## 2022-08-16 PROCEDURE — 27000207 HC ISOLATION

## 2022-08-16 PROCEDURE — 25000242 PHARM REV CODE 250 ALT 637 W/ HCPCS: Performed by: INTERNAL MEDICINE

## 2022-08-16 PROCEDURE — 11000001 HC ACUTE MED/SURG PRIVATE ROOM

## 2022-08-16 PROCEDURE — 99254 PR INITIAL INPATIENT CONSULT,LEVL IV: ICD-10-PCS | Mod: ,,, | Performed by: INTERNAL MEDICINE

## 2022-08-16 PROCEDURE — 25000003 PHARM REV CODE 250: Performed by: INTERNAL MEDICINE

## 2022-08-16 PROCEDURE — 99254 IP/OBS CNSLTJ NEW/EST MOD 60: CPT | Mod: ,,, | Performed by: INTERNAL MEDICINE

## 2022-08-16 PROCEDURE — 63600175 PHARM REV CODE 636 W HCPCS: Performed by: INTERNAL MEDICINE

## 2022-08-16 RX ORDER — FUROSEMIDE 10 MG/ML
80 INJECTION INTRAMUSCULAR; INTRAVENOUS
Status: DISCONTINUED | OUTPATIENT
Start: 2022-08-16 | End: 2022-08-17

## 2022-08-16 RX ORDER — ERGOCALCIFEROL 1.25 MG/1
50000 CAPSULE ORAL
Status: DISCONTINUED | OUTPATIENT
Start: 2022-08-16 | End: 2022-08-17 | Stop reason: HOSPADM

## 2022-08-16 RX ORDER — TORSEMIDE 20 MG/1
40 TABLET ORAL 2 TIMES DAILY
Status: DISCONTINUED | OUTPATIENT
Start: 2022-08-16 | End: 2022-08-16

## 2022-08-16 RX ORDER — ALBUTEROL SULFATE 90 UG/1
2 AEROSOL, METERED RESPIRATORY (INHALATION)
Status: DISCONTINUED | OUTPATIENT
Start: 2022-08-16 | End: 2022-08-17 | Stop reason: HOSPADM

## 2022-08-16 RX ORDER — ASCORBIC ACID 500 MG
500 TABLET ORAL DAILY
Status: DISCONTINUED | OUTPATIENT
Start: 2022-08-16 | End: 2022-08-17 | Stop reason: HOSPADM

## 2022-08-16 RX ORDER — ZINC SULFATE 50(220)MG
220 CAPSULE ORAL DAILY
Status: DISCONTINUED | OUTPATIENT
Start: 2022-08-16 | End: 2022-08-17 | Stop reason: HOSPADM

## 2022-08-16 RX ORDER — ENOXAPARIN SODIUM 100 MG/ML
30 INJECTION SUBCUTANEOUS EVERY 24 HOURS
Status: DISCONTINUED | OUTPATIENT
Start: 2022-08-16 | End: 2022-08-17 | Stop reason: HOSPADM

## 2022-08-16 RX ADMIN — NIFEDIPINE 60 MG: 60 TABLET, FILM COATED, EXTENDED RELEASE ORAL at 08:08

## 2022-08-16 RX ADMIN — ENOXAPARIN SODIUM 30 MG: 30 INJECTION SUBCUTANEOUS at 05:08

## 2022-08-16 RX ADMIN — DOXAZOSIN 4 MG: 4 TABLET ORAL at 12:08

## 2022-08-16 RX ADMIN — ALBUTEROL SULFATE 2 PUFF: 90 AEROSOL, METERED RESPIRATORY (INHALATION) at 09:08

## 2022-08-16 RX ADMIN — FUROSEMIDE 80 MG: 10 INJECTION, SOLUTION INTRAMUSCULAR; INTRAVENOUS at 06:08

## 2022-08-16 RX ADMIN — SODIUM BICARBONATE 650 MG TABLET 1300 MG: at 08:08

## 2022-08-16 RX ADMIN — ZINC SULFATE 220 MG (50 MG) CAPSULE 220 MG: CAPSULE at 09:08

## 2022-08-16 RX ADMIN — HYDRALAZINE HYDROCHLORIDE 100 MG: 50 TABLET, FILM COATED ORAL at 08:08

## 2022-08-16 RX ADMIN — METHYLPREDNISOLONE SODIUM SUCCINATE 60 MG: 40 INJECTION, POWDER, FOR SOLUTION INTRAMUSCULAR; INTRAVENOUS at 06:08

## 2022-08-16 RX ADMIN — NIFEDIPINE 60 MG: 60 TABLET, FILM COATED, EXTENDED RELEASE ORAL at 09:08

## 2022-08-16 RX ADMIN — HYDRALAZINE HYDROCHLORIDE 100 MG: 50 TABLET, FILM COATED ORAL at 04:08

## 2022-08-16 RX ADMIN — ATORVASTATIN CALCIUM 40 MG: 40 TABLET, FILM COATED ORAL at 08:08

## 2022-08-16 RX ADMIN — CLOPIDOGREL 75 MG: 75 TABLET, FILM COATED ORAL at 09:08

## 2022-08-16 RX ADMIN — ALBUTEROL SULFATE 2 PUFF: 90 AEROSOL, METERED RESPIRATORY (INHALATION) at 08:08

## 2022-08-16 RX ADMIN — SODIUM BICARBONATE 650 MG TABLET 1300 MG: at 09:08

## 2022-08-16 RX ADMIN — INSULIN ASPART 3 UNITS: 100 INJECTION, SOLUTION INTRAVENOUS; SUBCUTANEOUS at 11:08

## 2022-08-16 RX ADMIN — HYDRALAZINE HYDROCHLORIDE 100 MG: 50 TABLET, FILM COATED ORAL at 09:08

## 2022-08-16 RX ADMIN — ALBUTEROL SULFATE 2 PUFF: 90 AEROSOL, METERED RESPIRATORY (INHALATION) at 12:08

## 2022-08-16 RX ADMIN — PANTOPRAZOLE SODIUM 40 MG: 40 TABLET, DELAYED RELEASE ORAL at 06:08

## 2022-08-16 RX ADMIN — ERGOCALCIFEROL 50000 UNITS: 1.25 CAPSULE ORAL at 09:08

## 2022-08-16 RX ADMIN — INSULIN ASPART 5 UNITS: 100 INJECTION, SOLUTION INTRAVENOUS; SUBCUTANEOUS at 12:08

## 2022-08-16 RX ADMIN — DOXAZOSIN 4 MG: 4 TABLET ORAL at 09:08

## 2022-08-16 RX ADMIN — ISOSORBIDE MONONITRATE 30 MG: 30 TABLET, EXTENDED RELEASE ORAL at 09:08

## 2022-08-16 RX ADMIN — INSULIN ASPART 5 UNITS: 100 INJECTION, SOLUTION INTRAVENOUS; SUBCUTANEOUS at 05:08

## 2022-08-16 RX ADMIN — LABETALOL HYDROCHLORIDE 200 MG: 200 TABLET, FILM COATED ORAL at 09:08

## 2022-08-16 RX ADMIN — ALBUTEROL SULFATE 2 PUFF: 90 AEROSOL, METERED RESPIRATORY (INHALATION) at 04:08

## 2022-08-16 RX ADMIN — LABETALOL HYDROCHLORIDE 200 MG: 200 TABLET, FILM COATED ORAL at 08:08

## 2022-08-16 RX ADMIN — ESCITALOPRAM OXALATE 10 MG: 10 TABLET ORAL at 09:08

## 2022-08-16 RX ADMIN — OXYCODONE HYDROCHLORIDE AND ACETAMINOPHEN 500 MG: 500 TABLET ORAL at 09:08

## 2022-08-16 NOTE — CONSULTS
[facility] New Consult Note    Patient Name: Nicholas Vera  Admission Date: 8/15/2022  Hospital Length of Stay: 1 days  Code Status: Full Code   Attending Physician: Nola Escalante MD   Primary Care Physician: Saqib Emery Jr, MD  Principal Problem:Hypertensive emergency    HPI:    Nicholas Vera 56 y.o. male  has a past medical history of Anxiety, CAD (coronary artery disease), CKD (chronic kidney disease) stage 4, GFR 15-29 ml/min, COPD (chronic obstructive pulmonary disease), DDD (degenerative disc disease), cervical and lumbar s/p surgery, GERD (gastroesophageal reflux disease), HTN (hypertension), Hyperlipidemia, SHAYLEE on CPAP, PSVT (paroxysmal supraventricular tachycardia), TIA (transient ischemic attack), and Type 2 diabetes mellitus. presents for had concerns including Cough (pT. C/O LOW OXYGEN WHEN AMBULATING.. DX WITH COVID... CAME FROM PCP OFFICE.. AMBULATORY TO TRIAGE WITH STEADY GAIT ). on 8/15/2022.     Patient had recent AV fistula placed around week ago the left forearm   For the past few days he has been having weight gain, we increased his diuretic dose.  Unfortunately recently he has been having generalized body ache fever and coughing.  He checked his oxygen at home and his sat was reading low.  He had a COVID test at [facility] became positive patient was referred to the emergency room where he indeed tested positive\.  Patient was given IV Lasix following which he is feeling much better  Still complaining of some cough but no increased dyspnea his blood pressure was very high last night but that was attributed to him not taking his blood pressure medication  He denies any chest pain abdominal pain or vomiting         Medical History:   Past Medical History:   Diagnosis Date    Anxiety     CAD (coronary artery disease)     CKD (chronic kidney disease) stage 4, GFR 15-29 ml/min     COPD (chronic obstructive pulmonary disease)     DDD (degenerative disc disease), cervical and lumbar  s/p surgery     GERD (gastroesophageal reflux disease)     HTN (hypertension)     Hyperlipidemia     SHAYLEE on CPAP     PSVT (paroxysmal supraventricular tachycardia)     TIA (transient ischemic attack)     Type 2 diabetes mellitus        Surgical History:   Past Surgical History:   Procedure Laterality Date    ANTERIOR CERVICAL DISCECTOMY W/ FUSION  11/26/2021    C5-6, C6-7 ACDF- Dr. Jett    CARPAL TUNNEL RELEASE Bilateral     bilateral CTR & left UND- 2004 & 2020    COLONOSCOPY      CORONARY STENT PLACEMENT  2018    RCA    ELBOW SURGERY Bilateral     FUNCTIONAL ENDOSCOPIC SINUS SURGERY (FESS)      INGUINAL HERNIA REPAIR      MICRODISCECTOMY OF SPINE  04/17/2015    L4-5. L5-S1 decompression; left L4-5 microdiscectomy- Dr. Amaro    MYRINGOTOMY W/ TUBES      NASAL SEPTOPLASTY      SINUS ENDOSCOPY      TONSILLECTOMY, ADENOIDECTOMY      VASECTOMY         Family History:   Family History   Problem Relation Age of Onset    Multiple sclerosis Daughter    .     Social History:   Social History     Tobacco Use    Smoking status: Former Smoker     Types: Cigarettes    Smokeless tobacco: Never Used   Substance Use Topics    Alcohol use: Never       Allergies:  Review of patient's allergies indicates:  No Known Allergies      Review of Systems:  Constitutional:  Dyspnea and cough as per present illness  Skin: Denies wounds, rashes, no skin lesions or itching  EENT: Denies acute hearing/vision changes, tinnitus, or dysphagia  Respiratory:  Cough and dyspnea.  Feels better since he received IV Lasix  Cardiovascular: Denies chest pain, palpitations, edema better since diuretic dose increased  Gastrointestional: Denies abdominal pain, nausea, vomiting, diarrhea or constipation  Genitourinary: Denies dysuria, hematuria, or incontinence; able to empty bladder  Musculoskeletal:  Generalized body aches attributed to the COVID infection  Neurological: Denies headaches, seizures, paresthesias, dizziness or  weakness  Hematological: Denies unusual bruising or bleeding  Psychiatric: Denies psychiatric concerns, hallucinations, or depression; no confusion    Medications:    Current Facility-Administered Medications:     acetaminophen tablet 1,000 mg, 1,000 mg, Oral, Q6H PRN, Nola Escalante MD    acetaminophen tablet 650 mg, 650 mg, Oral, Q4H PRN, Nola Escalante MD    albuterol inhaler 2 puff, 2 puff, Inhalation, Q6H PRN, Nola Escalante MD    albuterol inhaler 2 puff, 2 puff, Inhalation, QID WAKE **AND** MDI Q4H WAKE, , , Q4H WAKE, Nola Escalante MD    aluminum-magnesium hydroxide-simethicone 200-200-20 mg/5 mL suspension 30 mL, 30 mL, Oral, QID PRN, Nola Escalante MD    ascorbic acid (vitamin C) tablet 500 mg, 500 mg, Oral, Daily, Nola Escalante MD    atorvastatin tablet 40 mg, 40 mg, Oral, QHS, Nola Escalante MD    benzonatate capsule 200 mg, 200 mg, Oral, TID PRN, Nola Escalante MD    cloNIDine tablet 0.2 mg, 0.2 mg, Oral, TID PRN, Nola Escalante MD, 0.2 mg at 08/15/22 2332    clopidogreL tablet 75 mg, 75 mg, Oral, Daily, Nola Escalante MD    dextromethorphan-guaiFENesin  mg/5 ml liquid 5 mL, 5 mL, Oral, Q4H PRN, Nola Escalante MD    dextrose 10% bolus 125 mL, 12.5 g, Intravenous, PRN, Nola Escalante MD    dextrose 10% bolus 250 mL, 25 g, Intravenous, PRN, Nola Escalante MD    doxazosin tablet 4 mg, 4 mg, Oral, Daily, Nola Escalante MD, 4 mg at 08/16/22 0043    enoxaparin injection 30 mg, 30 mg, Subcutaneous, Daily, Nola Escalante MD    ergocalciferol capsule 50,000 Units, 50,000 Units, Oral, Q7 Days, Nola Escalante MD    EScitalopram oxalate tablet 10 mg, 10 mg, Oral, Daily, Nola Escalante MD    furosemide injection 80 mg, 80 mg, Intravenous, Q12H, Nola Escalante MD, 80 mg at 08/16/22 0630    glucagon (human recombinant) injection 1 mg, 1 mg, Intramuscular, PRN, Nola Escalante MD    glucose chewable tablet 16 g, 16 g, Oral, PRN, Nola Escalante MD    glucose chewable tablet 24 g, 24 g, Oral, PRN, Nola Escalante MD    hydrALAZINE  injection 20 mg, 20 mg, Intravenous, Q2H PRN, Ria Doshi AGACNP-BC    hydrALAZINE tablet 100 mg, 100 mg, Oral, TID, Nola Escalante MD    insulin aspart U-100 injection 0-5 Units, 0-5 Units, Subcutaneous, QID (AC + HS) PRN, Nola Escalante MD    isosorbide mononitrate 24 hr tablet 30 mg, 30 mg, Oral, Daily, Nola Escalante MD    labetaloL injection 20 mg, 20 mg, Intravenous, Q2H PRN, JOVAN MonsonCNP-BC, 20 mg at 08/15/22 2248    labetaloL tablet 200 mg, 200 mg, Oral, BID, Nola Escalante MD    melatonin tablet 6 mg, 6 mg, Oral, Nightly PRN, Nola Escalante MD    methylPREDNISolone sodium succinate injection 60 mg, 60 mg, Intravenous, Q8H, Nola Escalante MD, 60 mg at 08/16/22 0632    NIFEdipine 24 hr tablet 60 mg, 60 mg, Oral, BID, Nola Escalante MD    ondansetron injection 4 mg, 4 mg, Intravenous, Q4H PRN, Nola Escalante MD    pantoprazole EC tablet 40 mg, 40 mg, Oral, Daily, Nola Escalante MD, 40 mg at 08/16/22 0632    polyethylene glycol packet 17 g, 17 g, Oral, BID PRN, Nola Escalante MD    prochlorperazine injection Soln 5 mg, 5 mg, Intravenous, Q6H PRN, Nola Escalante MD    senna-docusate 8.6-50 mg per tablet 1 tablet, 1 tablet, Oral, BID PRN, Nola Escalante MD    simethicone chewable tablet 80 mg, 1 tablet, Oral, QID PRN, Nola Escalante MD    sodium bicarbonate tablet 1,300 mg, 1,300 mg, Oral, BID, Nola Escalante MD    sodium chloride 0.9% flush 10 mL, 10 mL, Intravenous, PRN, Nola Escalante MD    umeclidinium-vilanteroL 62.5-25 mcg/actuation DsDv 1 puff, 1 puff, Inhalation, Daily, Nola Escalante MD    zinc sulfate capsule 220 mg, 220 mg, Oral, Daily, Nola Escalante MD    Current Outpatient Medications:     ANORO ELLIPTA 62.5-25 mcg/actuation DsDv, as needed., Disp: , Rfl:     ascorbic acid, vitamin C, (VITAMIN C) 500 MG tablet, Take 500 mg by mouth once daily., Disp: , Rfl:     BASAGLAR KWIKPEN U-100 INSULIN glargine 100 units/mL (3mL) SubQ pen, Inject 25 Units into the skin once daily., Disp: , Rfl:      cholecalciferol, vitamin D3, 1,250 mcg (50,000 unit) capsule, Take 1,250 mcg by mouth once a week., Disp: , Rfl:     clopidogreL (PLAVIX) 75 mg tablet, Take 75 mg by mouth once daily at 6am., Disp: , Rfl:     EScitalopram oxalate (LEXAPRO) 10 MG tablet, Take 10 mg by mouth once daily at 6am., Disp: , Rfl:     FARXIGA 10 mg tablet, Take 10 mg by mouth once daily at 6am., Disp: , Rfl:     fenofibrate (TRICOR) 145 MG tablet, Take 145 mg by mouth once daily at 6am., Disp: , Rfl:     glimepiride (AMARYL) 4 MG tablet, Take 4 mg by mouth 2 (two) times a day., Disp: , Rfl:     hydrALAZINE (APRESOLINE) 50 MG tablet, , Disp: , Rfl:     isosorbide mononitrate (IMDUR) 30 MG 24 hr tablet, TAKE 1 TABLET BY MOUTH ONCE DAILY, Disp: 90 tablet, Rfl: 0    labetaloL (NORMODYNE) 200 MG tablet, Take 200 mg by mouth 2 (two) times a day., Disp: , Rfl:     multivit-mins/iron/folic/lycop (CENTRUM MEN ORAL), Take 1 tablet by mouth once daily at 6am., Disp: , Rfl:     NIFEdipine (ADALAT CC) 60 MG TbSR, Take by mouth 2 (two) times a day., Disp: , Rfl:     nitroGLYCERIN (NITROSTAT) 0.4 MG SL tablet, as needed., Disp: , Rfl:     pantoprazole (PROTONIX) 40 MG tablet, Take 40 mg by mouth once daily at 6am., Disp: , Rfl:     rosuvastatin (CRESTOR) 20 MG tablet, Take 20 mg by mouth once daily., Disp: , Rfl:     sodium bicarbonate 650 MG tablet, Take 2 tablets (1,300 mg total) by mouth 2 (two) times daily., Disp: 180 tablet, Rfl: 3    terazosin (HYTRIN) 2 MG capsule, Take 2 mg by mouth 2 (two) times a day., Disp: , Rfl:     torsemide (DEMADEX) 20 MG Tab, Take 2 tablets (40 mg total) by mouth 2 (two) times a day., Disp: 60 tablet, Rfl: 1    TRULICITY 1.5 mg/0.5 mL pen injector, once a week., Disp: , Rfl:     VASCEPA 1 gram Cap, Take 2 tablets by mouth 2 (two) times a day., Disp: , Rfl:     XYOSTED 100 mg/0.5 mL AtIn, Inject into the skin once a week., Disp: , Rfl:      Scheduled Meds:   albuterol  2 puff Inhalation QID WAKE     ascorbic acid (vitamin C)  500 mg Oral Daily    atorvastatin  40 mg Oral QHS    clopidogreL  75 mg Oral Daily    doxazosin  4 mg Oral Daily    enoxaparin  30 mg Subcutaneous Daily    ergocalciferol  50,000 Units Oral Q7 Days    EScitalopram oxalate  10 mg Oral Daily    furosemide (LASIX) injection  80 mg Intravenous Q12H    hydrALAZINE  100 mg Oral TID    isosorbide mononitrate  30 mg Oral Daily    labetaloL  200 mg Oral BID    methylPREDNISolone sodium succinate injection  60 mg Intravenous Q8H    NIFEdipine  60 mg Oral BID    pantoprazole  40 mg Oral Daily    sodium bicarbonate  1,300 mg Oral BID    umeclidinium-vilanteroL  1 puff Inhalation Daily    zinc sulfate  220 mg Oral Daily     Continuous Infusions:      Objective:  BP (!) 141/55   Pulse 69   Temp 98.2 °F (36.8 °C)   Resp (!) 28   SpO2 (!) 93%  There is no height or weight on file to calculate BMI.    No intake/output data recorded.  No intake/output data recorded.        Physical Exam:  General: no acute distress, awake, alert  Eyes: PERRLA, EOMI, conjunctiva clear, eyelids without swelling  HENT: atraumatic, oropharynx and nasal mucosa patent, no difficulty hearing  Neck: full ROM, no JVD, no thyromegaly or lymphadenopathy  Respiratory: equal, unlabored, very fine rhonchi at the bases  Cardiovascular: RRR without rub; BL radial and pedal pulses felt  Edema: none in lower ext  Gastrointestinal: soft, non-tender, non-distended; positive bowel sounds; no masses to palpation  Genitourinary: no CVA tenderness upon palpation  Musculoskeletal: full ROM without limitation or discomfort  Integumentary: warm, dry; no rashes, wounds, or skin lesions  Neurological: oriented, appropriate, no acute deficits  Dialysis access:  L UE AV fistula, good thrill , not mature yet    Labs:  Chemistries:   Recent Labs   Lab 08/11/22  1145 08/15/22  1628    138   K 4.5 4.5   CO2 20* 20*   BUN 62.3* 37.9*   CREATININE 4.39* 4.01*   CALCIUM 8.8 9.6    BILITOT 0.6 0.8   ALKPHOS 53 62   ALT 13 18   AST 30 35*   GLUCOSE 50* 41*   PHOS 4.2  --         CBC/Anemia Labs: Coags:    Recent Labs   Lab 08/11/22  1145 08/15/22  1628   WBC 7.6 5.3   HGB 10.3* 10.8*   HCT 32.1* 34.7*    278   MCV 85.4 86.5   RDW 15.8 15.4   FERRITIN  --  180.82    No results for input(s): PT, INR, APTT in the last 168 hours.     POCT Glucose: HbA1c:    Recent Labs   Lab 08/15/22  1837 08/15/22  2207   POCTGLUCOSE 104 158*    Hemoglobin A1C   Date Value Ref Range Status   06/09/2020 8.4 (H) <=6.5 % Final   09/21/2012 7.1 (H) 4.0 - 6.2 % Final     Hemoglobin A1c   Date Value Ref Range Status   08/11/2022 5.9 <=7.0 % Final   09/03/2021 9.1 (H) <<=7.0 % Final        No results for input(s): COLORU, CLARITYU, SPECGRAV, PHUR, PROTEINUA, GLUCOSEU, BILIRUBINCON, BLOODU, WBCU, RBCU, BACTERIA, MUCUS, NITRITE, LEUKOCYTESUR, UROBILINOGEN, HYALINECASTS in the last 24 hours.      Impression:    Patient Active Problem List   Diagnosis    Anemia due to stage 5 chronic kidney disease, not on chronic dialysis    Iron deficiency anemia due to chronic blood loss    Cervical spondylosis with myelopathy    Hypertensive emergency    COVID-19     CKD 4 related to diabetic nephropathy  Volume overload better with IV diuresis  Hypertension more controlled    Plan:  Continue IV diuresis need to keep a close eye on his blood sugar since he is on steroids  Will continue monitoring with you  No acute indication for dialysis       Guido Lozada

## 2022-08-16 NOTE — H&P
[facility] Hospital Medicine   History & Physical Note      Patient Name: Nicholas Vera  : 1965  MRN: [id number]  PCP: Saqib Emery Jr, MD  Admitting Service: Hospital Medicine  Attending Physician: Nola Escalante MD  Admission Date: 8/15/2022 - IP- Inpatient   Length of Stay: 0  History source: EMR, patient and/or patient's family  Face-to-Face encounter date: 08/15/2022  Code status: Full    Chief Complaint   Cough, dyspnea on exertion, weight gain, COVID-19 positive at PCP office prior to arrival.    History of Present Illness   This is a 56-year-old male with medical history for CAD/stent, COPD, SHAYLEE/CPAP, HTN, HLD, PSVT, CKD stage 4 with recent AV fistula creation on 2022 for anticipation of hemodialysis initiation soon.  Present to the ED on 08/15/2022 with complaint of cough for 2 days, and worsening dyspnea on exertion over the past week, tested positive for COVID-19 at PCP office and immediately sent to the ED for further evaluation.  Report dyspnea on exertion started about a week ago and he gained about 11 lb, he contacted his nephrologist Dr. Lozada and torsemide increased to 40 mg twice daily.  Report cough started 2 days ago, minimally productive of clear sputum. Report nausea no vomiting.  Report loose stool 2 bowel movement per day for the past 2 days.  Report did not take his medications last night and this morning.  Denies chest pain, fever or chills.  Denies headache, blurry vision, extremity weakness or numbness.    On arrival to ED he was afebrile, hypertensive BP > 200/90, saturating 92% on room air.  EKG showed normal sinus rhythm.  Chest x-ray show prominent pulmonary vascular markings and trace bilateral pleural effusions.  Labs notable for creatinine 4.01, BUN 37, CO2 20, potassium 4.5, glucose 45, , troponin 0.083.  Repeat fingerstick immediately after labs resulted showed glucose 105.    Has given dexamethasone 6 mg IV and  antihypertensives and referred to hospital medicine service for further evaluation and management.    ROS   Except as documented, all other systems reviewed and negative     Past Medical History   · CKD stage 4 - Diabetic nephropathy and HTN nephrosclerosis - AVF creation 8/9/2022  · T2DM  · HTN  · HLD  · Paroxysmal SVT (implantable loop recorder removed in 2018)  · Anemia of chronic disease - on Epogen for Hb < 11  · COPD  · SHAYLEE on CPAP  · CAD/stent   Echo 01/06/2021:  LVEF 52-62%, moderate LVH, normal diastolic filling pattern, moderately dilated LA, mild MR present.  [facility]: 7/16/2020 patent RCA stent,  mLAD 50%, mLCx 60, no intervention, Dr. Fuentes  [facility]: 9/18/2018 PCI/stent to RCA Dr. Murry  · TIA  · DDD/cervical and lumbar radiculopathy s/p surgery  · GERD  · Anxiety  · HX Post-polypectomy GI bleed    Past Surgical History   · Left arm AV fistula creation 8/9/2022  · Anterior cervical discectomy and fusion  · Carpal tunnel release  · LHC/PCI/stent  · Implantable loop recorder placement and removal 7/10/2018  · Bilateral elbow surgery  · Lumbar laminectomy and microdiscectomy  · Myringotomy, sinus surgery and nasal septoplasty  · Tonsillectomy and adenoidectomy  · Vasectomy  · Colonoscopy - 3/4/2022  · Peripheral angiogram 1/7/2021 mild calcific disease bilateral lower extremities without evidence of focal stenosis/    Social History     Social History     Tobacco Use    Smoking status: Former Smoker     Types: Cigarettes    Smokeless tobacco: Never Used   Substance Use Topics    Alcohol use: Never        Family History   Reviewed and negative    Allergies   Patient has no known allergies.    Home Medications      Medication Sig Start Date End Date Taking?   ANORO ELLIPTA 62.5-25 mcg/actuation DsDv as needed. 4/27/22     ascorbic acid, vitamin C, (VITAMIN C) 500 MG tablet Take 500 mg by mouth once daily.      BASAGLAR KWIKPEN U-100 INSULIN glargine 100 units/mL (3mL) SubQ pen Inject 25 Units into the skin  once daily. 5/9/22     cholecalciferol, vitamin D3, 1,250 mcg (50,000 unit) capsule Take 1,250 mcg by mouth once a week. 9/8/21     clopidogreL (PLAVIX) 75 mg tablet Take 75 mg by mouth once daily at 6am. 1/25/22     EScitalopram oxalate (LEXAPRO) 10 MG tablet Take 10 mg by mouth once daily at 6am. 4/8/22     FARXIGA 10 mg tablet Take 10 mg by mouth once daily at 6am. 4/11/22     fenofibrate (TRICOR) 145 MG tablet Take 145 mg by mouth once daily at 6am. 3/16/22     glimepiride (AMARYL) 4 MG tablet Take 4 mg by mouth 2 (two) times a day. 4/18/22     hydrALAZINE (APRESOLINE) 50 MG tablet  6/25/22     isosorbide mononitrate (IMDUR) 30 MG 24 hr tablet TAKE 1 TABLET BY MOUTH ONCE DAILY 7/19/22     labetaloL (NORMODYNE) 200 MG tablet Take 200 mg by mouth 2 (two) times a day. 4/14/22     multivit-mins/iron/folic/lycop (CENTRUM MEN ORAL) Take 1 tablet by mouth once daily at 6am.      NIFEdipine (ADALAT CC) 60 MG TbSR Take by mouth 2 (two) times a day. 5/19/22     nitroGLYCERIN (NITROSTAT) 0.4 MG SL tablet as needed. 5/17/22     pantoprazole (PROTONIX) 40 MG tablet Take 40 mg by mouth once daily at 6am. 4/13/22     rosuvastatin (CRESTOR) 20 MG tablet Take 20 mg by mouth once daily. 5/12/22     sodium bicarbonate 650 MG tablet Take 2 tablets (1,300 mg total) by mouth 2 (two) times daily. 7/20/22 7/20/23    terazosin (HYTRIN) 2 MG capsule Take 2 mg by mouth 2 (two) times a day. 4/19/21     torsemide (DEMADEX) 20 MG Tab Take 2 tablets (40 mg total) by mouth 2 (two) times a day. 8/11/22 10/10/22    TRULICITY 1.5 mg/0.5 mL pen injector once a week. 2/1/22     VASCEPA 1 gram Cap Take 2 tablets by mouth 2 (two) times a day. 2/1/22     XYOSTED 100 mg/0.5 mL AtIn Inject into the skin once a week. 5/17/22           Inpatient Medications   Scheduled Meds   albuterol  2 puff Inhalation QID WAKE    ascorbic acid (vitamin C)  500 mg Oral Daily    atorvastatin  40 mg Oral QHS    clopidogreL  75 mg Oral Daily    doxazosin  4 mg Oral  Daily    enoxaparin  30 mg Subcutaneous Daily    ergocalciferol  50,000 Units Oral Q7 Days    EScitalopram oxalate  10 mg Oral Daily    furosemide (LASIX) injection  80 mg Intravenous Q12H    hydrALAZINE  100 mg Oral TID    isosorbide mononitrate  30 mg Oral Daily    labetaloL  200 mg Oral BID    methylPREDNISolone sodium succinate injection  60 mg Intravenous Q8H    NIFEdipine  60 mg Oral BID    pantoprazole  40 mg Oral Daily    sodium bicarbonate  1,300 mg Oral BID    umeclidinium-vilanteroL  1 puff Inhalation Daily    zinc sulfate  220 mg Oral Daily     Continuous Infusions    PRN Meds  acetaminophen, acetaminophen, albuterol, aluminum-magnesium hydroxide-simethicone, benzonatate, cloNIDine, dextromethorphan-guaiFENesin  mg/5 ml, dextrose 10%, dextrose 10%, glucagon (human recombinant), glucose, glucose, hydrALAZINE, insulin aspart U-100, labetaloL, melatonin, ondansetron, polyethylene glycol, prochlorperazine, senna-docusate 8.6-50 mg, simethicone, sodium chloride 0.9%    Physical Exam   Vital Signs  Temp:  [98.2 °F (36.8 °C)]   Pulse:  [73-85]   Resp:  [26]   BP: (179-246)/(73-97)   SpO2:  [92 %-96 %]    General: Appears comfortable  HEENT: NC/AT  Neck:  No JVD  Chest:  bilateral expiratory wheezing and crackles at the bases  CVS: Regular rhythm. Normal S1/S2.  +1 pedal edema  Abdomen: nondistended, normoactive BS, soft and non-tender.  MSK:  left arm AV fistula site with ecchymosis  Skin: Warm and dry  Neuro: AAOx3, no focal neurological deficit  Psych: Cooperative    Labs     Recent Labs     08/15/22  1628   WBC 5.3   RBC 4.01*   HGB 10.8*   HCT 34.7*   MCV 86.5   MCH 26.9*   MCHC 31.1*   RDW 15.4           Recent Labs     08/15/22  1628      K 4.5   CHLORIDE 108*   CO2 20*   BUN 37.9*   CREATININE 4.01*   GLUCOSE 41*   CALCIUM 9.6   ALBUMIN 3.8   GLOBULIN 4.1*   ALKPHOS 62   ALT 18   AST 35*   BILITOT 0.8     Recent Labs     08/15/22  1628   TROPONINI 0.059*           Microbiology Results (last 7 days)     ** No results found for the last 168 hours. **         Imaging     X-Ray Chest PA And Lateral   Final Result      Prominent interstitial markings in the lung bases with trace bilateral pleural effusions.         Electronically signed by: Josi Lopez   Date:    08/15/2022   Time:    16:47        Assessment & Plan   1. Hypertensive emergency  2. NSTEMI probably type 2 secondary to hypertensive emergency/LV strain  3. Acute hypoxemic respiratory failure - multifactorial (COPD, volume overload, COVID-19)  4. COPD exacerbation  5. COVID-19 infection ?Pneumonitis  6. Acute on chronic  HFpEF/renal related volume overload  7. CKD stage 4 -- Cr and GFR at baseline  8. Hypoglycemia- resolved     HX T2DM, HTN, HLD, CAD/stent, PSVT, Anxiety    Plan:    Isolation precautions  Check D-dimer, ferritin, CRP, ESR --Add-on to previous labs  Solu-Medrol 60mg IV q8h  No remdesivir given CKD  Albuterol MDI q4h, resume LABA/LAMA INH  Incentive spirometry  Receive home CPAP at bedtime and nasal cannula as needed during day for saturation above 90%  Transthoracic echo ordered- pending  Troponin q6h x2  Lasix 80 mg IV q12h   Nitropaste 2 inches x1  Resume Nifedipine XL 60 mg BID, labetalol 200 mg BID, Imdur 30 mg daily, Terazosin equivalent   Increase hydralazine to 100mg TID  PRN IV antihypertensive for BP > 170/90  SSI ACHS, hold long-acting insulin and glimepiride  Remaining home medications reviewed and resumed  VTE Prophylaxis: SCD/Enoxaparin 30mg SC daily    Critical care time: 40 minutes  Critical care diagnosis:  Hypertensive emergency, hypoxemic respiratory failure, acute on chronic diastolic heart failure,    Nola Escalante MD  Internal Medicine

## 2022-08-17 ENCOUNTER — PATIENT MESSAGE (OUTPATIENT)
Dept: ADMINISTRATIVE | Facility: CLINIC | Age: 57
End: 2022-08-17
Payer: COMMERCIAL

## 2022-08-17 ENCOUNTER — PATIENT MESSAGE (OUTPATIENT)
Dept: ADMINISTRATIVE | Facility: OTHER | Age: 57
End: 2022-08-17
Payer: COMMERCIAL

## 2022-08-17 ENCOUNTER — NURSE TRIAGE (OUTPATIENT)
Dept: ADMINISTRATIVE | Facility: CLINIC | Age: 57
End: 2022-08-17
Payer: COMMERCIAL

## 2022-08-17 VITALS
OXYGEN SATURATION: 92 % | RESPIRATION RATE: 17 BRPM | TEMPERATURE: 98 F | WEIGHT: 230.63 LBS | HEIGHT: 67 IN | DIASTOLIC BLOOD PRESSURE: 65 MMHG | BODY MASS INDEX: 36.2 KG/M2 | SYSTOLIC BLOOD PRESSURE: 134 MMHG | HEART RATE: 82 BPM

## 2022-08-17 DIAGNOSIS — N18.4 CKD (CHRONIC KIDNEY DISEASE) STAGE 4, GFR 15-29 ML/MIN: Primary | ICD-10-CM

## 2022-08-17 LAB
ALBUMIN SERPL-MCNC: 3.5 GM/DL (ref 3.5–5)
ALBUMIN/GLOB SERPL: 1.1 RATIO (ref 1.1–2)
ALP SERPL-CCNC: 62 UNIT/L (ref 40–150)
ALT SERPL-CCNC: 23 UNIT/L (ref 0–55)
AST SERPL-CCNC: 29 UNIT/L (ref 5–34)
BASOPHILS # BLD AUTO: 0.03 X10(3)/MCL (ref 0–0.2)
BASOPHILS NFR BLD AUTO: 0.4 %
BILIRUBIN DIRECT+TOT PNL SERPL-MCNC: 0.5 MG/DL
BUN SERPL-MCNC: 68.2 MG/DL (ref 8.4–25.7)
CALCIUM SERPL-MCNC: 8.5 MG/DL (ref 8.4–10.2)
CHLORIDE SERPL-SCNC: 97 MMOL/L (ref 98–107)
CO2 SERPL-SCNC: 18 MMOL/L (ref 22–29)
CREAT SERPL-MCNC: 4.96 MG/DL (ref 0.73–1.18)
EOSINOPHIL # BLD AUTO: 0 X10(3)/MCL (ref 0–0.9)
EOSINOPHIL NFR BLD AUTO: 0 %
ERYTHROCYTE [DISTWIDTH] IN BLOOD BY AUTOMATED COUNT: 15 % (ref 11.5–17)
GFR SERPLBLD CREATININE-BSD FMLA CKD-EPI: 13 MLS/MIN/1.73/M2
GLOBULIN SER-MCNC: 3.3 GM/DL (ref 2.4–3.5)
GLUCOSE SERPL-MCNC: 377 MG/DL (ref 74–100)
HCT VFR BLD AUTO: 33.4 % (ref 42–52)
HGB BLD-MCNC: 10.6 GM/DL (ref 14–18)
IMM GRANULOCYTES # BLD AUTO: 0.04 X10(3)/MCL (ref 0–0.04)
IMM GRANULOCYTES NFR BLD AUTO: 0.5 %
LYMPHOCYTES # BLD AUTO: 0.9 X10(3)/MCL (ref 0.6–4.6)
LYMPHOCYTES NFR BLD AUTO: 10.6 %
MCH RBC QN AUTO: 26.6 PG (ref 27–31)
MCHC RBC AUTO-ENTMCNC: 31.7 MG/DL (ref 33–36)
MCV RBC AUTO: 83.7 FL (ref 80–94)
MONOCYTES # BLD AUTO: 1.26 X10(3)/MCL (ref 0.1–1.3)
MONOCYTES NFR BLD AUTO: 14.8 %
NEUTROPHILS # BLD AUTO: 6.3 X10(3)/MCL (ref 2.1–9.2)
NEUTROPHILS NFR BLD AUTO: 73.7 %
NRBC BLD AUTO-RTO: 0 %
PLATELET # BLD AUTO: 304 X10(3)/MCL (ref 130–400)
PMV BLD AUTO: 10.4 FL (ref 7.4–10.4)
POCT GLUCOSE: 337 MG/DL (ref 70–110)
POTASSIUM SERPL-SCNC: 4.3 MMOL/L (ref 3.5–5.1)
PROT SERPL-MCNC: 6.8 GM/DL (ref 6.4–8.3)
RBC # BLD AUTO: 3.99 X10(6)/MCL (ref 4.7–6.1)
SODIUM SERPL-SCNC: 130 MMOL/L (ref 136–145)
TROPONIN I SERPL-MCNC: 0.04 NG/ML (ref 0–0.04)
WBC # SPEC AUTO: 8.5 X10(3)/MCL (ref 4.5–11.5)

## 2022-08-17 PROCEDURE — 80053 COMPREHEN METABOLIC PANEL: CPT | Performed by: INTERNAL MEDICINE

## 2022-08-17 PROCEDURE — 25000003 PHARM REV CODE 250: Performed by: INTERNAL MEDICINE

## 2022-08-17 PROCEDURE — 99232 PR SUBSEQUENT HOSPITAL CARE,LEVL II: ICD-10-PCS | Mod: ,,, | Performed by: INTERNAL MEDICINE

## 2022-08-17 PROCEDURE — 85025 COMPLETE CBC W/AUTO DIFF WBC: CPT | Performed by: INTERNAL MEDICINE

## 2022-08-17 PROCEDURE — 99232 SBSQ HOSP IP/OBS MODERATE 35: CPT | Mod: ,,, | Performed by: INTERNAL MEDICINE

## 2022-08-17 PROCEDURE — 36415 COLL VENOUS BLD VENIPUNCTURE: CPT | Performed by: INTERNAL MEDICINE

## 2022-08-17 PROCEDURE — 36415 COLL VENOUS BLD VENIPUNCTURE: CPT | Performed by: HOSPITALIST

## 2022-08-17 PROCEDURE — 63600175 PHARM REV CODE 636 W HCPCS: Performed by: INTERNAL MEDICINE

## 2022-08-17 PROCEDURE — 25000242 PHARM REV CODE 250 ALT 637 W/ HCPCS: Performed by: INTERNAL MEDICINE

## 2022-08-17 PROCEDURE — 84484 ASSAY OF TROPONIN QUANT: CPT | Performed by: HOSPITALIST

## 2022-08-17 RX ORDER — HYDRALAZINE HYDROCHLORIDE 100 MG/1
100 TABLET, FILM COATED ORAL 3 TIMES DAILY
Qty: 90 TABLET | Refills: 11 | Status: ON HOLD | OUTPATIENT
Start: 2022-08-17 | End: 2022-09-16 | Stop reason: HOSPADM

## 2022-08-17 RX ADMIN — ALBUTEROL SULFATE 2 PUFF: 90 AEROSOL, METERED RESPIRATORY (INHALATION) at 09:08

## 2022-08-17 RX ADMIN — HYDRALAZINE HYDROCHLORIDE 100 MG: 50 TABLET, FILM COATED ORAL at 09:08

## 2022-08-17 RX ADMIN — SODIUM BICARBONATE 650 MG TABLET 1300 MG: at 09:08

## 2022-08-17 RX ADMIN — NIFEDIPINE 60 MG: 60 TABLET, FILM COATED, EXTENDED RELEASE ORAL at 09:08

## 2022-08-17 RX ADMIN — ISOSORBIDE MONONITRATE 30 MG: 30 TABLET, EXTENDED RELEASE ORAL at 09:08

## 2022-08-17 RX ADMIN — ZINC SULFATE 220 MG (50 MG) CAPSULE 220 MG: CAPSULE at 09:08

## 2022-08-17 RX ADMIN — UMECLIDINIUM BROMIDE AND VILANTEROL TRIFENATATE 1 PUFF: 62.5; 25 POWDER RESPIRATORY (INHALATION) at 09:08

## 2022-08-17 RX ADMIN — FUROSEMIDE 80 MG: 10 INJECTION, SOLUTION INTRAMUSCULAR; INTRAVENOUS at 05:08

## 2022-08-17 RX ADMIN — OXYCODONE HYDROCHLORIDE AND ACETAMINOPHEN 500 MG: 500 TABLET ORAL at 09:08

## 2022-08-17 RX ADMIN — ESCITALOPRAM OXALATE 10 MG: 10 TABLET ORAL at 09:08

## 2022-08-17 RX ADMIN — DOXAZOSIN 4 MG: 4 TABLET ORAL at 09:08

## 2022-08-17 RX ADMIN — PANTOPRAZOLE SODIUM 40 MG: 40 TABLET, DELAYED RELEASE ORAL at 05:08

## 2022-08-17 RX ADMIN — CLOPIDOGREL 75 MG: 75 TABLET, FILM COATED ORAL at 09:08

## 2022-08-17 NOTE — DISCHARGE SUMMARY
[facility] Hospital Medicine Discharge Summary    Admit Date: 8/15/2022  Discharge Date and Time: 8/17/20229:36 AM  Admitting Physician: Hospitalist team   Discharging Physician: Gage Slater MD.  Primary Care Physician: Saqib Emery Jr, MD      Discharge Diagnoses:  Hypertensive emergency  NSTEMI probably type 2 secondary to hypertensiveemergency/LV strain  Acute hypoxemic respiratory failure - multifactorial (COPD, volume overload, COVID-19)  COPD exacerbation  COVID-19 infection ?Pneumonitis  Acute on chronic  HFpEF/renal related volume overload  CKD stage 4 -- Cr and GFR at baseline  Hypoglycemia- resolved      HX T2DM, HTN, HLD, CAD/stent, PSVT, Anxiety    Hospital Course:   56-year-old male with medical history for CAD/stent, COPD, SHAYLEE/CPAP, HTN, HLD, PSVT, CKD stage 4 with recent AV fistula creation on 08/09/2022 for anticipation of hemodialysis initiation soon.  Present to the ED on 08/15/2022 with complaint of cough for 2 days, and worsening dyspnea on exertion over the past week, tested positive for COVID-19 at PCP office and immediately sent to the ED for further evaluation.  Report dyspnea on exertion started about a week ago and he gained about 11 lb, he contacted his nephrologist Dr. Lozada and torsemide increased to 40 mg twice daily.  Report cough started 2 days ago, minimally productive of clear sputum. Report nausea no vomiting.  Report loose stool 2 bowel movement per day for the past 2 days.  Report did not take his medications last night and this morning.  Denies chest pain, fever or chills.  Denies headache, blurry vision, extremity weakness or numbness.     On arrival to ED he was afebrile, hypertensive BP > 200/90, saturating 92% on room air.  EKG showed normal sinus rhythm.  Chest x-ray show prominent pulmonary vascular markings and trace bilateral pleural effusions.  Labs notable for creatinine 4.01, BUN 37, CO2 20, potassium 4.5, glucose 45, BNP  "425, troponin 0.083.  Repeat fingerstick immediately after labs resulted showed glucose 105.     Has given dexamethasone 6 mg IV and antihypertensives and referred to hospital medicine service for further evaluation and management.    Patient doing great this morning.  Remain on room air uses CPAP at night.  Feeling back at baseline.  Asking if he can go home today.  Discussed that from a COVID standpoint use stable.  Would not recommend any further steroids as he is on room air and it may his sugar elevated.  His creatinine did bump up to 2.96 this morning.  He is followed by Dr. Lozada as an outpatient.  Sodium also dropped to 130. Dr. Lozada will see him next week in clinic. He is feeling well enough to go home.        Vitals:  Blood pressure 134/65, pulse 82, temperature 97.5 °F (36.4 °C), resp. rate 17, height 5' 7" (1.702 m), weight 104.6 kg (230 lb 9.6 oz), SpO2 (!) 92 %..    Physical Exam:  Awake, Alert, Oriented x 3, No new focal Neurologic deficit, Normal Affect  NC/AT, PERRLA, EOMI  Supple neck, no JVD, No cervical lymphadenopathy  Symmetrical chest, Good air entry bilaterally. No rhonchi, wheezes, crackles appreciated  RRR, No gallop, rub or murmur  +ve Bowel sounds x4, Abd soft and non tender, no rebound, guarding or rigidity  No Cyanosis, cludding or edema, No new rash or bruises    Procedures Performed: No admission procedures for hospital encounter.     Significant Diagnostic Studies: See Full reports for all details  Admission on 08/15/2022   Component Date Value    WBC 08/15/2022 5.3     RBC 08/15/2022 4.01 (A)    Hgb 08/15/2022 10.8 (A)    Hct 08/15/2022 34.7 (A)    MCV 08/15/2022 86.5     MCH 08/15/2022 26.9 (A)    MCHC 08/15/2022 31.1 (A)    RDW 08/15/2022 15.4     Platelet 08/15/2022 278     MPV 08/15/2022 10.6 (A)    Neut % 08/15/2022 74.4     Lymph % 08/15/2022 7.6     Mono % 08/15/2022 14.6     Eos % 08/15/2022 2.1     Basophil % 08/15/2022 0.9     Lymph # 08/15/2022 0.40 (A) "    Neut # 08/15/2022 3.9     Mono # 08/15/2022 0.77     Eos # 08/15/2022 0.11     Baso # 08/15/2022 0.05     IG# 08/15/2022 0.02     IG% 08/15/2022 0.4     NRBC% 08/15/2022 0.0     Sodium Level 08/15/2022 138     Potassium Level 08/15/2022 4.5     Chloride 08/15/2022 108 (A)    Carbon Dioxide 08/15/2022 20 (A)    Glucose Level 08/15/2022 41 (A)    Blood Urea Nitrogen 08/15/2022 37.9 (A)    Creatinine 08/15/2022 4.01 (A)    Calcium Level Total 08/15/2022 9.6     Protein Total 08/15/2022 7.9     Albumin Level 08/15/2022 3.8     Globulin 08/15/2022 4.1 (A)    Albumin/Globulin Ratio 08/15/2022 0.9 (A)    Bilirubin Total 08/15/2022 0.8     Alkaline Phosphatase 08/15/2022 62     Alanine Aminotransferase 08/15/2022 18     Aspartate Aminotransfera* 08/15/2022 35 (A)    eGFR 08/15/2022 17     Troponin-I 08/15/2022 0.059 (A)    Influenza A PCR 08/15/2022 Not Detected     Influenza B PCR 08/15/2022 Not Detected     SARS-CoV-2 PCR 08/15/2022 Detected (A)    POCT Glucose 08/15/2022 104     Natriuretic Peptide 08/15/2022 425.4 (A)    D-Dimer 08/15/2022 1.28 (A)    Sed Rate 08/15/2022 75 (A)    C-Reactive Protein High * 08/15/2022 11.06 (A)    Ferritin Level 08/15/2022 180.82     Troponin-I 08/15/2022 0.083 (A)    POCT Glucose 08/15/2022 158 (A)    BSA 08/16/2022 2.24     TDI SEPTAL 08/16/2022 0.06     LV LATERAL E/E' RATIO 08/16/2022 12.44     LV SEPTAL E/E' RATIO 08/16/2022 18.67     Right Atrial Pressure (f* 08/16/2022 3     EF 08/16/2022 60     Left Ventricular Outflow* 08/16/2022 0.83     Left Ventricular Outflow* 08/16/2022 3.00     TDI LATERAL 08/16/2022 0.09     PV PEAK VELOCITY 08/16/2022 1.47     LVIDd 08/16/2022 5.66     IVS 08/16/2022 2.02 (A)    Posterior Wall 08/16/2022 1.75 (A)    LVIDs 08/16/2022 3.56     FS 08/16/2022 37     LV mass 08/16/2022 547.42     LA size 08/16/2022 5.00     RVDD 08/16/2022 3.35     TAPSE 08/16/2022 2.14     Left Ventricle Relative *  08/16/2022 0.62     AV mean gradient 08/16/2022 6     AV valve area 08/16/2022 2.20     AV Velocity Ratio 08/16/2022 0.66     AV index (prosthetic) 08/16/2022 0.58     MV mean gradient 08/16/2022 4     MV valve area p 1/2 meth* 08/16/2022 3.33     MV valve area by continu* 08/16/2022 2.31     E/A ratio 08/16/2022 1.03     Mean e' 08/16/2022 0.08     LVOT diameter 08/16/2022 2.20     LVOT area 08/16/2022 3.8     LVOT peak kun 08/16/2022 1.13     LVOT peak VTI 08/16/2022 23.70     Ao peak kun 08/16/2022 1.70     Ao VTI 08/16/2022 40.9     LVOT stroke volume 08/16/2022 90.05     AV peak gradient 08/16/2022 12     MV peak gradient 08/16/2022 7     TV rest pulmonary artery* 08/16/2022 31     E/E' ratio 08/16/2022 14.93     MV Peak E Kun 08/16/2022 1.12     TR Max Kun 08/16/2022 2.65     MV VTI 08/16/2022 38.9     MV stenosis pressure 1/2* 08/16/2022 66.00     MV Peak A Kun 08/16/2022 1.09     LV Systolic Volume 08/16/2022 53.00     LV Systolic Volume Index 08/16/2022 24.4     LV Diastolic Volume 08/16/2022 157.00     LV Diastolic Volume Index 08/16/2022 72.35     LV Mass Index 08/16/2022 252     Triscuspid Valve Regurgi* 08/16/2022 28     LA Volume Index (Mod) 08/16/2022 50.7     LA volume (mod) 08/16/2022 110.00     POCT Glucose 08/16/2022 476 (A)    POCT Glucose 08/16/2022 475 (A)    POCT Glucose 08/16/2022 391 (A)    Sodium Level 08/17/2022 130 (A)    Potassium Level 08/17/2022 4.3     Chloride 08/17/2022 97 (A)    Carbon Dioxide 08/17/2022 18 (A)    Glucose Level 08/17/2022 377 (A)    Blood Urea Nitrogen 08/17/2022 68.2 (A)    Creatinine 08/17/2022 4.96 (A)    Calcium Level Total 08/17/2022 8.5     Protein Total 08/17/2022 6.8     Albumin Level 08/17/2022 3.5     Globulin 08/17/2022 3.3     Albumin/Globulin Ratio 08/17/2022 1.1     Bilirubin Total 08/17/2022 0.5     Alkaline Phosphatase 08/17/2022 62     Alanine Aminotransferase 08/17/2022 23     Aspartate  Aminotransfera* 08/17/2022 29     eGFR 08/17/2022 13     WBC 08/17/2022 8.5     RBC 08/17/2022 3.99 (A)    Hgb 08/17/2022 10.6 (A)    Hct 08/17/2022 33.4 (A)    MCV 08/17/2022 83.7     MCH 08/17/2022 26.6 (A)    MCHC 08/17/2022 31.7 (A)    RDW 08/17/2022 15.0     Platelet 08/17/2022 304     MPV 08/17/2022 10.4     Neut % 08/17/2022 73.7     Lymph % 08/17/2022 10.6     Mono % 08/17/2022 14.8     Eos % 08/17/2022 0.0     Basophil % 08/17/2022 0.4     Lymph # 08/17/2022 0.90     Neut # 08/17/2022 6.3     Mono # 08/17/2022 1.26     Eos # 08/17/2022 0.00     Baso # 08/17/2022 0.03     IG# 08/17/2022 0.04     IG% 08/17/2022 0.5     NRBC% 08/17/2022 0.0     Troponin-I 08/17/2022 0.038     POCT Glucose 08/17/2022 337 (A)        Microbiology Results (last 7 days)     ** No results found for the last 168 hours. **           X-Ray Chest PA And Lateral    Result Date: 8/15/2022  EXAMINATION: XR CHEST PA AND LATERAL CLINICAL HISTORY: sob; TECHNIQUE: PA and lateral chest radiographs COMPARISON: Chest x-ray dated 05/16/2022 FINDINGS: The heart is stable in size.  There are prominent interstitial markings in the lung bases without focal consolidation.  There are trace bilateral pleural effusions.  There is no pneumothorax.  There are degenerative changes of the thoracic spine.     Prominent interstitial markings in the lung bases with trace bilateral pleural effusions. Electronically signed by: Josi Lopez Date:    08/15/2022 Time:    16:47  - pulls last radiology orders        Medication List      CHANGE how you take these medications    hydrALAZINE 100 MG tablet  Commonly known as: APRESOLINE  Take 1 tablet (100 mg total) by mouth 3 (three) times daily.  What changed:   · medication strength  · how much to take  · how to take this  · when to take this        CONTINUE taking these medications    ANORO ELLIPTA 62.5-25 mcg/actuation Dsdv  Generic drug: umeclidinium-vilanteroL     ascorbic acid (vitamin  C) 500 MG tablet  Commonly known as: VITAMIN C     BASAGLAR KWIKPEN U-100 INSULIN glargine 100 units/mL SubQ pen  Generic drug: insulin     CENTRUM MEN ORAL     cholecalciferol (vitamin D3) 1,250 mcg (50,000 unit) capsule     clopidogreL 75 mg tablet  Commonly known as: PLAVIX     EScitalopram oxalate 10 MG tablet  Commonly known as: LEXAPRO     FARXIGA 10 mg tablet  Generic drug: dapagliflozin     fenofibrate 145 MG tablet  Commonly known as: TRICOR     glimepiride 4 MG tablet  Commonly known as: AMARYL     isosorbide mononitrate 30 MG 24 hr tablet  Commonly known as: IMDUR  TAKE 1 TABLET BY MOUTH ONCE DAILY     labetaloL 200 MG tablet  Commonly known as: NORMODYNE     NIFEdipine 60 MG Tbsr  Commonly known as: ADALAT CC     nitroGLYCERIN 0.4 MG SL tablet  Commonly known as: NITROSTAT     pantoprazole 40 MG tablet  Commonly known as: PROTONIX     rosuvastatin 20 MG tablet  Commonly known as: CRESTOR     sodium bicarbonate 650 MG tablet  Take 2 tablets (1,300 mg total) by mouth 2 (two) times daily.     terazosin 2 MG capsule  Commonly known as: HYTRIN     torsemide 20 MG Tab  Commonly known as: DEMADEX  Take 2 tablets (40 mg total) by mouth 2 (two) times a day.     TRULICITY 1.5 mg/0.5 mL pen injector  Generic drug: dulaglutide     VASCEPA 1 gram Cap  Generic drug: icosapent ethyL     XYOSTED 100 mg/0.5 mL Atin  Generic drug: testosterone enanthate           Where to Get Your Medications      These medications were sent to [id number] - [place] [facility] - [address]  [address] [place] [address]    Phone: [phone]   · hydrALAZINE 100 MG tablet          Explained in detail to the patient about the discharge plan, medications, and follow-up visits. Pt understands and agrees with the treatment plan  Discharged Condition: stable  Diet: renal  Disposition: home    Medications Per DC med rec  Activities as tolerated  Follow up with your PCP in 2 wks   For further questions contact hospitalist  office    Discharge time 33 minutes    For worsening symptoms, chest pain, shortness of breath, increased abdominal pain, high grade fever, stroke or stroke like symptoms, immediately go to the nearest Emergency Room or call 911 as soon as possible.      Gage Turcios M.D, on 8/17/2022. at 9:36 AM.

## 2022-08-17 NOTE — TELEPHONE ENCOUNTER
[place]    PCP:  Dr. Saqib Emery    1st attempt to contact pt regarding enrollment in the Covid Surveillance Program.  Called patient to review COVID-19 Surveillance Program enrollment process.  Denies any symptoms at this time.  H/O COPD and CKD.  C/O intermittent cough and slight SOB.  Wife reports that pulse ox device that pt has already was not reading accurately therefore pulse ox will be placed by Vashti MARTINEZ RN TL for OOC, for mail order to be sent to pts home address.  Per protocol, care advised is see HCP within 4 hrs.  OCA offered and declined.  Wife states that nothing has changed since discharge therefore pt does not need to be seen.  Care advice reinforced.  Unsure whether pt will follow care advised.  Instructed to call for worsening/questions/concerns.  VU.    Smartphone:  Yes    [facility]:  Yes    Program consent:  Yes    Pulse oximeter status:  Pending (to be sent by mail/pt will use own pulse ox until arrival/of note, wife reports that pulse ox pt has now was not registering properly and that is why an order was placed)    Verified emergency contacts:  Yes    Program Overview: Reviewed , no response process, and importance of correct emergency contacts in event that well-being check is warranted. Patient will call [phone] 24/7 to speak with an OnCall nurse, if needed. Pt aware that if Sp02 less than or equal to 93% or if they have any worsening symptoms, they need to go to the emergency department. If they are having a medical emergency, they will call 911. Otherwise, patient will continue to submit data as scheduled. Reviewed importance of wearing mask if self or family members leave the house.     Patient had no further questions.    Sp02:  97%    Pulse:  74    Temperature:  97.8    SOB at rest (0-5):  1    SOB with activity (0-5):  1    Worsening symptoms:  No    Fever symptoms:  No    Increased home oxygen:  N/A;     Reason for Disposition   MILD difficulty breathing (e.g.,  minimal/no SOB at rest, SOB with walking, pulse <100)    Additional Information   Negative: SEVERE difficulty breathing (e.g., struggling for each breath, speaks in single words)   Negative: Difficult to awaken or acting confused (e.g., disoriented, slurred speech)   Negative: Bluish (or gray) lips or face now   Negative: Shock suspected (e.g., cold/pale/clammy skin, too weak to stand, low BP, rapid pulse)   Negative: Sounds like a life-threatening emergency to the triager   Negative: SEVERE or constant chest pain or pressure  (Exception: Mild central chest pain, present only when coughing.)   Negative: MODERATE difficulty breathing (e.g., speaks in phrases, SOB even at rest, pulse 100-120)   Negative: [1] Headache AND [2] stiff neck (can't touch chin to chest)   Negative: Oxygen level (e.g., pulse oximetry) 90 percent or lower   Negative: Chest pain or pressure   Negative: Patient sounds very sick or weak to the triager    Protocols used: CORONAVIRUS (COVID-19) DIAGNOSED OR [id number]

## 2022-08-17 NOTE — PLAN OF CARE
"D/C order noted- spoke to pt's spouse via phone.  She will provide transportation and assistance at home.  Ms. Vera states no previous HH services and MD did not speak to them about anything" special " for d/c.  No needs voiced at this time.  "

## 2022-08-17 NOTE — PLAN OF CARE
Problem: Adult Inpatient Plan of Care  Goal: Plan of Care Review  Outcome: Ongoing, Progressing  Goal: Patient-Specific Goal (Individualized)  Outcome: Ongoing, Progressing  Goal: Absence of Hospital-Acquired Illness or Injury  Outcome: Ongoing, Progressing  Goal: Optimal Comfort and Wellbeing  Outcome: Ongoing, Progressing  Intervention: Monitor Pain and Promote Comfort  Flowsheets (Taken 8/16/2022 2121)  Pain Management Interventions: relaxation techniques promoted  Intervention: Provide Person-Centered Care  Flowsheets (Taken 8/16/2022 2121)  Trust Relationship/Rapport:   care explained   choices provided   emotional support provided   empathic listening provided   questions answered  Goal: Readiness for Transition of Care  Outcome: Ongoing, Progressing

## 2022-08-17 NOTE — PROGRESS NOTES
[facility] Inpatient Progress Note      HPI:     Patient Name: Nichoals Vera  Admission Date: 8/15/2022  Hospital Length of Stay: 2 days  Code Status: Full Code   Attending Physician: Gennaro Elizalde MD   Primary Care Physician: Saqib Emery Jr, MD  Principal Problem:Hypertensive emergency      Today pt seen and examined  Labs, events, imaging and meds reviewed for this hospital stay  Feels better  No cough, no SOB  Wants to go home    Review of Systems:   Constitutional: Denies fever, fatigue, generalized weakness, night sweats, or acute weight change  Skin: Denies wounds, no rashes, no itching, no new skin lesions  HEENT: Denies acute change in hearing or vision, tinnitus, or dysphagia  Respiratory:  Less cough, no dyspnea  Cardiovascular: Denies chest pain, palpitations, or swelling  Gastrointestional: Denies abdominal pain, nausea, vomiting, diarrhea, or constipation  Genitourinary: Denies dysuria, hematuria, or incontinence; reports able to empty bladder  Musculoskeletal: Denies myalgias, back pain, decreased ROM or focal weakness  Neurological: Denies headaches, seizures, dizziness, paresthesias or weakness  Hematological: Denies unusual bruising or bleeding  Psychiatric: Denies hallucinations, depression, or confusion      Medications:   Scheduled Meds:   albuterol  2 puff Inhalation QID WAKE    ascorbic acid (vitamin C)  500 mg Oral Daily    atorvastatin  40 mg Oral QHS    clopidogreL  75 mg Oral Daily    doxazosin  4 mg Oral Daily    enoxaparin  30 mg Subcutaneous Daily    ergocalciferol  50,000 Units Oral Q7 Days    EScitalopram oxalate  10 mg Oral Daily    hydrALAZINE  100 mg Oral TID    isosorbide mononitrate  30 mg Oral Daily    labetaloL  200 mg Oral BID    NIFEdipine  60 mg Oral BID    pantoprazole  40 mg Oral Daily    sodium bicarbonate  1,300 mg Oral BID    umeclidinium-vilanteroL  1 puff Inhalation Daily    zinc sulfate  220 mg Oral Daily     Continuous  "Infusions:      Vitals:     Vitals:    08/17/22 0541 08/17/22 0737 08/17/22 0753 08/17/22 0918   BP: (!) 147/64 134/65 134/65    BP Location:       Patient Position:       Pulse: 64 71 71 82   Resp: 20 16 16 17   Temp: 97.6 °F (36.4 °C) 97.5 °F (36.4 °C) 97.5 °F (36.4 °C)    TempSrc: Axillary Oral     SpO2: (!) 91% (!) 92% (!) 92%    Weight:   104.6 kg (230 lb 9.6 oz)    Height:   5' 7" (1.702 m)          I/O last 3 completed shifts:  In: 480 [P.O.:480]  Out: 4700 [Urine:4700]    Intake/Output Summary (Last 24 hours) at 8/17/2022 0922  Last data filed at 8/17/2022 0300  Gross per 24 hour   Intake 480 ml   Output 4700 ml   Net -4220 ml       Physical Exam:   General: no acute distress, awake, alert  Eyes: PERRLA, EOMI, conjunctiva clear, eyelids without swelling  HENT: atraumatic, oropharynx and nasal mucosa patent  Neck: full ROM, no JVD, no thyromegaly or lymphadenopathy  Respiratory: equal, unlabored, clear to auscultation A/P  Cardiovascular: RRR withoutr rub; BL radial and pedal pulses felt  Edema: none  Gastrointestinal: soft, non-tender, non-distended; positive bowel sounds; no masses to palpation  Genitourinary: no CVA tenderness upon palpation  Musculoskeletal: full ROM without limitation or discomfort  Integumentary: warm, dry; no rashes, wounds, or skin lesions  Neurological: oriented, appropriate, no acute deficits  Dialysis access:  L AV fistula not mature yet      Labs:     Cardiac Enzymes: Ejection Fractions:    Recent Labs     08/15/22  1628 08/15/22  2111 08/17/22  0610   TROPONINI 0.059* 0.083* 0.038    EF   Date Value Ref Range Status   08/16/2022 60 %         Chemistries:   Recent Labs   Lab 08/11/22  1145 08/15/22  1628 08/17/22  0354    138 130*   K 4.5 4.5 4.3   CO2 20* 20* 18*   BUN 62.3* 37.9* 68.2*   CREATININE 4.39* 4.01* 4.96*   CALCIUM 8.8 9.6 8.5   BILITOT 0.6 0.8 0.5   ALKPHOS 53 62 62   ALT 13 18 23   AST 30 35* 29   GLUCOSE 50* 41* 377*   PHOS 4.2  --   --         CBC/Anemia " Labs: Coags:    Recent Labs   Lab 08/11/22  1145 08/15/22  1628 08/17/22  0353   WBC 7.6 5.3 8.5   HGB 10.3* 10.8* 10.6*   HCT 32.1* 34.7* 33.4*    278 304   MCV 85.4 86.5 83.7   RDW 15.8 15.4 15.0   FERRITIN  --  180.82  --     No results for input(s): PT, INR, APTT in the last 168 hours.     POCT Glucose: HbA1c:    Recent Labs   Lab 08/15/22  1837 08/15/22  2207 08/16/22  1131 08/16/22  1603 08/16/22  2240 08/17/22  0541   POCTGLUCOSE 104 158* 476* 475* 391* 337*    Hemoglobin A1C   Date Value Ref Range Status   06/09/2020 8.4 (H) <=6.5 % Final   09/21/2012 7.1 (H) 4.0 - 6.2 % Final     Hemoglobin A1c   Date Value Ref Range Status   08/11/2022 5.9 <=7.0 % Final          Impression:       Patient Active Problem List   Diagnosis    Anemia due to stage 5 chronic kidney disease, not on chronic dialysis    Iron deficiency anemia due to chronic blood loss    Cervical spondylosis with myelopathy    Hypertensive emergency    COVID-19   hypoxemia partially related to volume overload, better  Renal function worsening likely related to diuretic effect        Plan:     OK TO DC HOME  I will FU in office next week  Will set up for labs on day of visit  Mr Vera will hold his diuretics for 2 days than may resume torsemide afterwards       Guido Lozada

## 2022-08-17 NOTE — PROGRESS NOTES
Discharge instructions given to patient and wife. Follow up and new medications reviewed. IV and telemetry discontinued. Patient transported via wheelchair and personal vehicle.

## 2022-08-17 NOTE — PROGRESS NOTES
[facility]  Hospital Medicine Progress Note        Chief Complaint: Inpatient Follow-up for COVID     HPI:   56-year-old male with medical history for CAD/stent, COPD, SHAYLEE/CPAP, HTN, HLD, PSVT, CKD stage 4 with recent AV fistula creation on 08/09/2022 for anticipation of hemodialysis initiation soon.  Present to the ED on 08/15/2022 with complaint of cough for 2 days, and worsening dyspnea on exertion over the past week, tested positive for COVID-19 at PCP office and immediately sent to the ED for further evaluation.  Report dyspnea on exertion started about a week ago and he gained about 11 lb, he contacted his nephrologist Dr. Lozada and torsemide increased to 40 mg twice daily.  Report cough started 2 days ago, minimally productive of clear sputum. Report nausea no vomiting.  Report loose stool 2 bowel movement per day for the past 2 days.  Report did not take his medications last night and this morning.  Denies chest pain, fever or chills.  Denies headache, blurry vision, extremity weakness or numbness.     On arrival to ED he was afebrile, hypertensive BP > 200/90, saturating 92% on room air.  EKG showed normal sinus rhythm.  Chest x-ray show prominent pulmonary vascular markings and trace bilateral pleural effusions.  Labs notable for creatinine 4.01, BUN 37, CO2 20, potassium 4.5, glucose 45, , troponin 0.083.  Repeat fingerstick immediately after labs resulted showed glucose 105.     Has given dexamethasone 6 mg IV and antihypertensives and referred to hospital medicine service for further evaluation and management.    Interval Hx:  Patient doing great this morning.  Remain on room air uses CPAP at night.  Feeling back at baseline.  Asking if he can go home today.  Discussed that from a COVID standpoint use stable.  Number not recommend any further steroids as he is on room air and it may his sugar elevated.  His creatinine did bump up to 2.96 this morning.  He is  followed by Dr. Lozada as an outpatient.  Sodium also dropped to 130. .      Objective/physical exam:  General: In no acute distress, afebrile  Chest: Clear to auscultation bilaterally  Heart: RRR, +S1, S2, no appreciable murmur  Abdomen: Soft, nontender, BS +  MSK: Warm, no lower extremity edema, no clubbing or cyanosis  Neurologic: Alert and oriented x4, Cranial nerve II-XII intact, Strength 5/5 in all 4 extremities    VITAL SIGNS: 24 HRS MIN & MAX LAST   Temp  Min: 97.5 °F (36.4 °C)  Max: 98 °F (36.7 °C) 97.6 °F (36.4 °C)   BP  Min: 127/66  Max: 166/75 (!) 147/64   Pulse  Min: 64  Max: 81  64   Resp  Min: 20  Max: 25 20   SpO2  Min: 89 %  Max: 94 % (!) 91 %       Recent Labs   Lab 08/11/22  1145 08/15/22  1628 08/17/22  0353   WBC 7.6 5.3 8.5   RBC 3.76* 4.01* 3.99*   HGB 10.3* 10.8* 10.6*   HCT 32.1* 34.7* 33.4*   MCV 85.4 86.5 83.7   MCH 27.4 26.9* 26.6*   MCHC 32.1* 31.1* 31.7*   RDW 15.8 15.4 15.0    278 304   MPV 10.9* 10.6* 10.4       Recent Labs   Lab 08/11/22  1145 08/15/22  1628 08/17/22  0354    138 130*   K 4.5 4.5 4.3   CO2 20* 20* 18*   BUN 62.3* 37.9* 68.2*   CREATININE 4.39* 4.01* 4.96*   CALCIUM 8.8 9.6 8.5   ALBUMIN 3.9 3.8 3.5   ALKPHOS 53 62 62   ALT 13 18 23   AST 30 35* 29   BILITOT 0.6 0.8 0.5          Microbiology Results (last 7 days)     ** No results found for the last 168 hours. **           See below for Radiology    Scheduled Med:   albuterol  2 puff Inhalation QID WAKE    ascorbic acid (vitamin C)  500 mg Oral Daily    atorvastatin  40 mg Oral QHS    clopidogreL  75 mg Oral Daily    doxazosin  4 mg Oral Daily    enoxaparin  30 mg Subcutaneous Daily    ergocalciferol  50,000 Units Oral Q7 Days    EScitalopram oxalate  10 mg Oral Daily    furosemide (LASIX) injection  80 mg Intravenous Q12H    hydrALAZINE  100 mg Oral TID    isosorbide mononitrate  30 mg Oral Daily    labetaloL  200 mg Oral BID    NIFEdipine  60 mg Oral BID    pantoprazole  40 mg Oral Daily     sodium bicarbonate  1,300 mg Oral BID    umeclidinium-vilanteroL  1 puff Inhalation Daily    zinc sulfate  220 mg Oral Daily        Continuous Infusions:       PRN Meds:  acetaminophen, acetaminophen, albuterol, aluminum-magnesium hydroxide-simethicone, benzonatate, cloNIDine, dextromethorphan-guaiFENesin  mg/5 ml, dextrose 10%, dextrose 10%, glucagon (human recombinant), glucose, glucose, hydrALAZINE, insulin aspart U-100, labetaloL, melatonin, ondansetron, polyethylene glycol, prochlorperazine, senna-docusate 8.6-50 mg, simethicone, sodium chloride 0.9%       Assessment/Plan:   Hypertensive emergency  NSTEMI probably type 2 secondary to hypertensiveemergency/LV strain  Acute hypoxemic respiratory failure - multifactorial (COPD, volume overload, COVID-19)  COPD exacerbation  COVID-19 infection ?Pneumonitis  Acute on chronic  HFpEF/renal related volume overload  CKD stage 4 -- Cr and GFR at baseline  Hypoglycemia- resolved      HX T2DM, HTN, HLD, CAD/stent, PSVT, Anxiety     Blood pressure much improved.  No longer requiring IV antihypertensives.  Remains on room air and without complaint.  He was not a candidate for remdesivir due to chronic kidney disease.  I do not think he needs any further steroids as no respiratory complaints.  Did have a bump in his creatinine and drop in sodium overnight. Likely secondary to IV Lasix. Will hold further dosing. He is followed by Dr. Lozada as an outpatient.  Patient is requesting to go home.  Discussed that we need to talk with [facility].  If okay with monitoring creatinine as an outpatient could consider discharge.  Troponin has returned back to normal limits.  Likely just some demand type ischemia.  No further cardiac workup at this time      Patient condition:  Stable    Anticipated discharge and Disposition: Home today or tomorrow      All diagnosis and differential diagnosis have been reviewed; assessment and plan has been documented; I have personally  reviewed the labs and test results that are presently available; I have reviewed the patients medication list; I have reviewed the consulting providers response and recommendations. I have reviewed or attempted to review medical records based upon their availability    All of the patient's questions have been  addressed and answered. Patient's is agreeable to the above stated plan. I will continue to monitor closely and make adjustments to medical management as needed.  _____________________________________________________________________      Radiology:  X-Ray Chest PA And Lateral  Narrative: EXAMINATION:  XR CHEST PA AND LATERAL    CLINICAL HISTORY:  sob;    TECHNIQUE:  PA and lateral chest radiographs    COMPARISON:  Chest x-ray dated 05/16/2022    FINDINGS:  The heart is stable in size.  There are prominent interstitial markings in the lung bases without focal consolidation.  There are trace bilateral pleural effusions.  There is no pneumothorax.  There are degenerative changes of the thoracic spine.  Impression: Prominent interstitial markings in the lung bases with trace bilateral pleural effusions.    Electronically signed by: Josi Lopez  Date:    08/15/2022  Time:    16:47      Gage Slater MD   08/17/2022

## 2022-08-18 ENCOUNTER — NURSE TRIAGE (OUTPATIENT)
Dept: ADMINISTRATIVE | Facility: CLINIC | Age: 57
End: 2022-08-18
Payer: COMMERCIAL

## 2022-08-18 ENCOUNTER — PATIENT OUTREACH (OUTPATIENT)
Dept: ADMINISTRATIVE | Facility: CLINIC | Age: 57
End: 2022-08-18
Payer: COMMERCIAL

## 2022-08-18 ENCOUNTER — PATIENT MESSAGE (OUTPATIENT)
Dept: ADMINISTRATIVE | Facility: OTHER | Age: 57
End: 2022-08-18
Payer: COMMERCIAL

## 2022-08-18 NOTE — TELEPHONE ENCOUNTER
Nicholas Jones's wife calling states missed a call from someone. Transferred to [phone].    Reason for Disposition   Information only question and nurse able to answer    Protocols used: INFORMATION ONLY CALL - NO TRIAGE-A-OH

## 2022-08-18 NOTE — TELEPHONE ENCOUNTER
Reason for Disposition   Message left with person in household    Protocols used: NO CONTACT OR DUPLICATE CONTACT CALL-A-OH

## 2022-08-18 NOTE — PROGRESS NOTES
C3 nurse spoke with Nicholas Vera for a TCC post hospital discharge follow up call. The patient has a scheduled HOSFU appointment with Saqib Emery Jr, MD on 8/22/2022 @ 0900. The patient also has [facility] on 8/25/2022 @ 1605.

## 2022-08-18 NOTE — TELEPHONE ENCOUNTER
CC escalation call, 93% sat and sob 2/2 listed in questionnaire -     VM left for Pt explaining medical urgency d/t abnormal vs and to call ooc at [phone] to speak with a nurse or call 911 or go to the nearest ER if having a medical emergency. This has been an attempt to reach you to confirm if you are ok.    [employer] message text sent:    We are contacting you from [facility] Covid-19 Surveillance Program, as we need to follow up with you to discuss your recent vital sign and symptom submission on [employer]. We are calling to make sure that you are okay and to see if you need any additional care.  Please call [facility] ([phone]) to speak with one of our nurses. If you are having a medical emergency, call 911 or go to your nearest emergency room. Thank you.    VM left for EC to call Pt to have him call ooc at [phone] or if having a medical emergency to call 911 or go to the nearest ER.    Encounter routed to pcp high priority.

## 2022-08-18 NOTE — TELEPHONE ENCOUNTER
No contact or triage of pt. Pulse ox ordered for Covid 19 surveillance program placed with request to be mailed to patient.     Reason for Disposition   Caller has cancelled the call before the first contact    Protocols used: NO CONTACT OR DUPLICATE CONTACT CALL-A-AH

## 2022-08-18 NOTE — TELEPHONE ENCOUNTER
Incoming call.    Spouse called back ooc in response to prior message for assessment needed and said Pt was sleeping. When asked to recheck his sat wife said 93% was his normal and he is no longer having sob from coughing at this time because he had taken some cough medicine. Spouse did not want Pt woken up when asked if I could speak to him. Instructed spouse to call ooc at 1 [phone] if she had any questions or concerns and go to the ER or call 911 if he starts having shortness of breath worse than his baseline copd. Spouse agrees with above.  Encounter routed to pcp.

## 2022-08-19 ENCOUNTER — NURSE TRIAGE (OUTPATIENT)
Dept: ADMINISTRATIVE | Facility: CLINIC | Age: 57
End: 2022-08-19
Payer: COMMERCIAL

## 2022-08-19 ENCOUNTER — PATIENT MESSAGE (OUTPATIENT)
Dept: ADMINISTRATIVE | Facility: OTHER | Age: 57
End: 2022-08-19
Payer: COMMERCIAL

## 2022-08-19 NOTE — TELEPHONE ENCOUNTER
"[place]    PCP:  Dr. Saqib Emery    Per Covid Surveillance Program, pt escalated for abnormal symptoms; SOB 2/5 at rest.  Pt expresses that he wants to opt-out of Covid Surveillance Program.  [facility] offered but pt does not want to participate in that program either.  Pt refuses triage at this time.  He states that he is "fine".  Tasks removed.  Instructed to call for worsening/questions/concerns.  VU.    Reason for Disposition   Caller hangs up    Additional Information   Negative: Violent behavior, or threatening to physically hurt or kill someone   Negative: [1] Caller is very confused AND [2] no other adult (e.g., friend or family member) available   Negative: Sounds like a life-threatening emergency to the triager   Negative: Patient sounds very sick or weak to the triager   Negative: [1] [place] worried caller AND [2] second call within 4 hours about the same medical problem   Negative: [1] [place] worried caller AND [2] third call within 48 hours about the same medical problem   Negative: [1] [place] worried caller AND [2] can't be reassured by triager   Negative: [1] Caller demands to speak with the PCP AND [2] about sick adult (or sick caller)   Negative: [1] Angry or rude caller AND [2] doesn't respond to 5 minutes of triager counseling AND [3] sick adult (or caller)   Negative: [1] Caller mainly has complaints about past medical care, billing, etc. AND [2] has mild symptoms or is well   Negative: Difficult caller responded to phone counseling   Negative: Caller is requesting health information that triager feels is unethical or illegal   Negative: [1] Caller continues to be verbally abusive AND [2] after triager sets BOUNDARIES AND [3] Triager ends call    Protocols used: DIFFICULT CALL-A-AH      "

## 2022-08-23 ENCOUNTER — APPOINTMENT (OUTPATIENT)
Dept: LAB | Facility: HOSPITAL | Age: 57
End: 2022-08-23
Attending: INTERNAL MEDICINE
Payer: COMMERCIAL

## 2022-08-25 ENCOUNTER — OFFICE VISIT (OUTPATIENT)
Dept: NEPHROLOGY | Facility: CLINIC | Age: 57
End: 2022-08-25
Payer: COMMERCIAL

## 2022-08-25 VITALS
OXYGEN SATURATION: 96 % | WEIGHT: 222.69 LBS | HEART RATE: 69 BPM | RESPIRATION RATE: 20 BRPM | HEIGHT: 67 IN | BODY MASS INDEX: 34.95 KG/M2 | DIASTOLIC BLOOD PRESSURE: 70 MMHG | TEMPERATURE: 98 F | SYSTOLIC BLOOD PRESSURE: 130 MMHG

## 2022-08-25 DIAGNOSIS — I16.1 HYPERTENSIVE EMERGENCY: ICD-10-CM

## 2022-08-25 DIAGNOSIS — N18.5 CKD (CHRONIC KIDNEY DISEASE) STAGE 5, GFR LESS THAN 15 ML/MIN: Primary | ICD-10-CM

## 2022-08-25 DIAGNOSIS — D50.0 IRON DEFICIENCY ANEMIA DUE TO CHRONIC BLOOD LOSS: ICD-10-CM

## 2022-08-25 DIAGNOSIS — D63.1 ANEMIA IN STAGE 5 CHRONIC KIDNEY DISEASE, NOT ON CHRONIC DIALYSIS: ICD-10-CM

## 2022-08-25 DIAGNOSIS — N18.5 ANEMIA IN STAGE 5 CHRONIC KIDNEY DISEASE, NOT ON CHRONIC DIALYSIS: ICD-10-CM

## 2022-08-25 PROCEDURE — 99214 OFFICE O/P EST MOD 30 MIN: CPT | Mod: S$GLB,,, | Performed by: INTERNAL MEDICINE

## 2022-08-25 PROCEDURE — 99214 PR OFFICE/OUTPT VISIT, EST, LEVL IV, 30-39 MIN: ICD-10-PCS | Mod: S$GLB,,, | Performed by: INTERNAL MEDICINE

## 2022-08-25 PROCEDURE — 99999 PR PBB SHADOW E&M-EST. PATIENT-LVL V: CPT | Mod: PBBFAC,,, | Performed by: INTERNAL MEDICINE

## 2022-08-25 PROCEDURE — 99999 PR PBB SHADOW E&M-EST. PATIENT-LVL V: ICD-10-PCS | Mod: PBBFAC,,, | Performed by: INTERNAL MEDICINE

## 2022-08-25 RX ORDER — ACETAMINOPHEN 500 MG
5000 TABLET ORAL DAILY
COMMUNITY

## 2022-08-25 RX ORDER — ALBUTEROL SULFATE 90 UG/1
2 AEROSOL, METERED RESPIRATORY (INHALATION) EVERY 4 HOURS PRN
COMMUNITY
Start: 2022-08-24 | End: 2023-07-24

## 2022-08-25 NOTE — PROGRESS NOTES
[facility] Progress Note      HPI  Nicholas Vera, 56 y.o. male,  has a past medical history of Anxiety, CAD (coronary artery disease), CKD (chronic kidney disease) stage 4, GFR 15-29 ml/min, COPD (chronic obstructive pulmonary disease), DDD (degenerative disc disease), cervical and lumbar s/p surgery, GERD (gastroesophageal reflux disease), HTN (hypertension), Hyperlipidemia, SHAYLEE on CPAP, PSVT (paroxysmal supraventricular tachycardia), TIA (transient ischemic attack), and Type 2 diabetes mellitus. Nicholas Vera presents to office for a follow up visit  Patient with CKD 4 with advanced diabetic nephropathy, recently admitted because of a COVID infection and the pulmonary edema he responded nicely to IV diuretics he was also on steroids and antimicrobials.  He had a bump in his creatinine to 4.9.  Patient was discharged home on no diuretics for a couple of days now resumed torsemide 40 mg daily his creatinine back down to close to baseline of 4.0 patient feels better.  Still having occasional cough but no dyspnea no abdominal pain no fever.  He is very interested in getting a transplant evaluation his wife and his brother are both living donor potentials.    Patient denies taking NSAIDs, new antibiotics or recreational drugs. Denies recent episode of dehydration, diarrhea, vomiting, acute illness, hospitalization, recent angiograms or exposure to IV radiocontrast.      Medical History:   Past Medical History:   Diagnosis Date    Anxiety     CAD (coronary artery disease)     CKD (chronic kidney disease) stage 4, GFR 15-29 ml/min     COPD (chronic obstructive pulmonary disease)     DDD (degenerative disc disease), cervical and lumbar s/p surgery     GERD (gastroesophageal reflux disease)     HTN (hypertension)     Hyperlipidemia     SHAYLEE on CPAP     PSVT (paroxysmal supraventricular tachycardia)     TIA (transient ischemic attack)     Type 2 diabetes mellitus        Surgical History:   Past  Surgical History:   Procedure Laterality Date    ANTERIOR CERVICAL DISCECTOMY W/ FUSION  11/26/2021    C5-6, C6-7 ACDF- Dr. Jett    AV FISTULA PLACEMENT      CARPAL TUNNEL RELEASE Bilateral     bilateral CTR & left UND- 2004 & 2020    COLONOSCOPY      CORONARY STENT PLACEMENT  2018    RCA    ELBOW SURGERY Bilateral     FUNCTIONAL ENDOSCOPIC SINUS SURGERY (FESS)      INGUINAL HERNIA REPAIR      MICRODISCECTOMY OF SPINE  04/17/2015    L4-5. L5-S1 decompression; left L4-5 microdiscectomy- Dr. Amaro    MYRINGOTOMY W/ TUBES      NASAL SEPTOPLASTY      SINUS ENDOSCOPY      TONSILLECTOMY, ADENOIDECTOMY      VASECTOMY         Family History:   Family History   Problem Relation Age of Onset    Multiple sclerosis Daughter        Social History:   Social History     Tobacco Use    Smoking status: Former Smoker     Types: Cigarettes    Smokeless tobacco: Never Used   Substance Use Topics    Alcohol use: Never       Allergies:  Review of patient's allergies indicates:  No Known Allergies    Medications:    Current Outpatient Medications:     albuterol (PROVENTIL/VENTOLIN HFA) 90 mcg/actuation inhaler, Inhale into the lungs., Disp: , Rfl:     ANORO ELLIPTA 62.5-25 mcg/actuation DsDv, as needed., Disp: , Rfl:     ascorbic acid, vitamin C, (VITAMIN C) 500 MG tablet, Take 500 mg by mouth once daily., Disp: , Rfl:     BASAGLAR KWIKPEN U-100 INSULIN glargine 100 units/mL (3mL) SubQ pen, Inject 25 Units into the skin once daily., Disp: , Rfl:     cholecalciferol, vitamin D3, (VITAMIN D3) 125 mcg (5,000 unit) Tab, Take 5,000 Units by mouth once daily., Disp: , Rfl:     cholecalciferol, vitamin D3, 1,250 mcg (50,000 unit) capsule, Take 1,250 mcg by mouth once a week., Disp: , Rfl:     clopidogreL (PLAVIX) 75 mg tablet, Take 75 mg by mouth once daily at 6am., Disp: , Rfl:     EScitalopram oxalate (LEXAPRO) 10 MG tablet, Take 10 mg by mouth once daily at 6am., Disp: , Rfl:     FARXIGA 10 mg tablet, Take 10  mg by mouth once daily at 6am., Disp: , Rfl:     fenofibrate (TRICOR) 145 MG tablet, Take 145 mg by mouth once daily at 6am., Disp: , Rfl:     glimepiride (AMARYL) 4 MG tablet, Take 4 mg by mouth 2 (two) times a day., Disp: , Rfl:     hydrALAZINE (APRESOLINE) 100 MG tablet, Take 1 tablet (100 mg total) by mouth 3 (three) times daily., Disp: 90 tablet, Rfl: 11    isosorbide mononitrate (IMDUR) 30 MG 24 hr tablet, TAKE 1 TABLET BY MOUTH ONCE DAILY, Disp: 90 tablet, Rfl: 0    labetaloL (NORMODYNE) 200 MG tablet, Take 200 mg by mouth 2 (two) times a day., Disp: , Rfl:     multivit-mins/iron/folic/lycop (CENTRUM MEN ORAL), Take 1 tablet by mouth once daily at 6am., Disp: , Rfl:     NIFEdipine (ADALAT CC) 60 MG TbSR, Take by mouth 2 (two) times a day., Disp: , Rfl:     nitroGLYCERIN (NITROSTAT) 0.4 MG SL tablet, as needed., Disp: , Rfl:     pantoprazole (PROTONIX) 40 MG tablet, Take 40 mg by mouth once daily at 6am., Disp: , Rfl:     pulse oximeter (PULSE OXIMETER) device, Use twice daily at 8 AM and 3 PM and record the value in MyChart as directed.  **Please Mail to patient**, Disp: 1 each, Rfl: 0    rosuvastatin (CRESTOR) 20 MG tablet, Take 20 mg by mouth once daily., Disp: , Rfl:     sodium bicarbonate 650 MG tablet, Take 2 tablets (1,300 mg total) by mouth 2 (two) times daily., Disp: 180 tablet, Rfl: 3    terazosin (HYTRIN) 2 MG capsule, Take 2 mg by mouth 2 (two) times a day., Disp: , Rfl:     torsemide (DEMADEX) 20 MG Tab, Take 2 tablets (40 mg total) by mouth 2 (two) times a day. (Patient taking differently: Take 40 mg by mouth once daily.), Disp: 60 tablet, Rfl: 1    TRULICITY 1.5 mg/0.5 mL pen injector, once a week., Disp: , Rfl:     VASCEPA 1 gram Cap, Take 2 tablets by mouth 2 (two) times a day., Disp: , Rfl:     XYOSTED 100 mg/0.5 mL AtIn, Inject into the skin once a week., Disp: , Rfl:        ROS:    Constitutional: Denies fever, fatigue, generalized weakness, night sweats, or acute weight  "change  Skin: Denies wounds, no rashes, no itching, no new skin lesions  HEENT: Denies acute change in hearing or vision, tinnitus, or dysphagia  Respiratory:  Still having occasional cough but no dyspnea  Cardiovascular: Denies chest pain, palpitations, or swelling  Gastrointestional: Denies abdominal pain, nausea, vomiting, diarrhea, or constipation  Genitourinary: Denies dysuria, hematuria, or incontinence; reports able to empty bladder  Musculoskeletal: Denies myalgias, back pain, decreased ROM or focal weakness  Neurological: Denies headaches, seizures, dizziness, paresthesias or weakness  Hematological: Denies unusual bruising or bleeding  Psychiatric: Denies hallucinations, depression, or confusion      Vital Signs:  /70 (BP Location: Left arm, Patient Position: Sitting)   Pulse 69   Temp 97.7 °F (36.5 °C) (Oral)   Resp 20   Ht 5' 7" (1.702 m)   Wt 101 kg (222 lb 10.6 oz)   SpO2 96%   BMI 34.87 kg/m²   Body mass index is 34.87 kg/m².      Physical Exam:    General: no acute distress, awake, alert  Eyes: PERRLA, EOMI, conjunctiva clear, eyelids without swelling  HENT: atraumatic, oropharynx and nasal mucosa patent  Neck: full ROM, no JVD, no thyromegaly or lymphadenopathy  Respiratory: equal, unlabored, clear to auscultation A/P  Cardiovascular: RRR without  rub; radial and pedal pulses felt  Edema: none  Gastrointestinal: soft, non-tender, non-distended; positive bowel sounds; no masses to palpation  Musculoskeletal: full ROM without limitation or discomfort, patent left arm AV fistula but not mature yet  Integumentary: warm, dry; no rashes, wounds, or skin lesions  Neurological: oriented, appropriate, no acute deficits      Labs:        Component Value Date/Time     08/23/2022 1131     (L) 08/17/2022 0354     09/21/2012 1041    K 4.5 08/23/2022 1131    K 4.3 08/17/2022 0354    K 4.5 09/21/2012 1041     09/21/2012 1041    CO2 20 (L) 08/23/2022 1131    CO2 18 (L) 08/17/2022 " 0354    CO2 22 (L) 09/21/2012 1041    BUN 49.0 (H) 08/23/2022 1131    BUN 68.2 (H) 08/17/2022 0354    BUN 12 09/21/2012 1041    CREATININE 4.05 (H) 08/23/2022 1131    CREATININE 4.96 (H) 08/17/2022 0354    CREATININE 0.6 09/21/2012 1041    ESTGFRAFRICA >60 09/21/2012 1041    EGFRNONAA 15 07/13/2022 1441    EGFRNONAA 18 06/22/2022 1507    EGFRNONAA >60 09/21/2012 1041    CALCIUM 8.7 08/23/2022 1131    CALCIUM 8.5 08/17/2022 0354    CALCIUM 9.4 09/21/2012 1041    PHOS 4.2 08/11/2022 1145    .0 (H) 08/11/2022 1145            Component Value Date/Time    WBC 8.5 08/17/2022 0353    WBC 5.3 08/15/2022 1628    WBC 9.26 09/21/2012 1041    HGB 10.6 (L) 08/17/2022 0353    HGB 10.8 (L) 08/15/2022 1628    HGB 13.4 (L) 09/21/2012 1041    HCT 33.4 (L) 08/17/2022 0353    HCT 34.7 (L) 08/15/2022 1628    HCT 40.2 09/21/2012 1041     08/17/2022 0353     08/15/2022 1628     09/21/2012 1041         Impression:    Patient Active Problem List   Diagnosis    Anemia due to stage 5 chronic kidney disease, not on chronic dialysis    Iron deficiency anemia due to chronic blood loss    Cervical spondylosis with myelopathy    Hypertensive emergency    COVID-19     1. CKD (chronic kidney disease) stage 5, GFR less than 15 ml/min     2. Anemia in stage 5 chronic kidney disease, not on chronic dialysis     3. Hypertensive emergency     4. Iron deficiency anemia due to chronic blood loss         CKD 4-5 related to diabetic nephropathy.  Volume overload controlled  Anemia related to CKD    Plan:  In view of the volume status fluctuating patient will be benefitting better from frequent follow-ups on a monthly basis  Make sure we ordered lab 1 week before the visit  We will also refer him to [facility] for evaluation for living related transplant with his wife and his brother who might be potential donors    Avoid NSAIDs (Aleve, Mobic, Celebrex, Ibuprofen, Advil, Toradol and Diclofenac).       Guido  Neville

## 2022-08-26 ENCOUNTER — HOSPITAL ENCOUNTER (OUTPATIENT)
Dept: RADIOLOGY | Facility: HOSPITAL | Age: 57
Discharge: HOME | End: 2022-08-26
Attending: PHYSICIAN ASSISTANT
Payer: COMMERCIAL

## 2022-08-26 DIAGNOSIS — M47.12 CERVICAL SPONDYLOSIS WITH MYELOPATHY: ICD-10-CM

## 2022-08-26 PROCEDURE — 72052 X-RAY EXAM NECK SPINE 6/>VWS: CPT | Mod: TC

## 2022-08-28 LAB
AV INDEX (PROSTH): 0.58
AV MEAN GRADIENT: 6 MMHG
AV PEAK GRADIENT: 12 MMHG
AV VALVE AREA: 2.2 CM2
AV VELOCITY RATIO: 0.66
BSA FOR ECHO PROCEDURE: 2.24 M2
CV ECHO LV RWT: 0.62 CM
DOP CALC AO PEAK VEL: 1.7 M/S
DOP CALC AO VTI: 40.9 CM
DOP CALC LVOT AREA: 3.8 CM2
DOP CALC LVOT DIAMETER: 2.2 CM
DOP CALC LVOT PEAK VEL: 1.13 M/S
DOP CALC LVOT STROKE VOLUME: 90.05 CM3
DOP CALC MV VTI: 38.9 CM
DOP CALCLVOT PEAK VEL VTI: 23.7 CM
E/A RATIO: 1.03
E/E' RATIO: 14.93 M/S
ECHO LV POSTERIOR WALL: 1.75 CM (ref 0.6–1.1)
EJECTION FRACTION: 60 %
FRACTIONAL SHORTENING: 37 % (ref 28–44)
INTERVENTRICULAR SEPTUM: 2.02 CM (ref 0.6–1.1)
LEFT ATRIUM SIZE: 5 CM
LEFT ATRIUM VOLUME INDEX MOD: 50.7 ML/M2
LEFT ATRIUM VOLUME MOD: 110 CM3
LEFT INTERNAL DIMENSION IN SYSTOLE: 3.56 CM (ref 2.1–4)
LEFT VENTRICLE DIASTOLIC VOLUME INDEX: 72.35 ML/M2
LEFT VENTRICLE DIASTOLIC VOLUME: 157 ML
LEFT VENTRICLE MASS INDEX: 252 G/M2
LEFT VENTRICLE SYSTOLIC VOLUME INDEX: 24.4 ML/M2
LEFT VENTRICLE SYSTOLIC VOLUME: 53 ML
LEFT VENTRICULAR INTERNAL DIMENSION IN DIASTOLE: 5.66 CM (ref 3.5–6)
LEFT VENTRICULAR MASS: 547.42 G
LV LATERAL E/E' RATIO: 12.44 M/S
LV SEPTAL E/E' RATIO: 18.67 M/S
LVOT MG: 3 MMHG
LVOT MV: 0.83 CM/S
MV MEAN GRADIENT: 4 MMHG
MV PEAK A VEL: 1.09 M/S
MV PEAK E VEL: 1.12 M/S
MV PEAK GRADIENT: 7 MMHG
MV STENOSIS PRESSURE HALF TIME: 66 MS
MV VALVE AREA BY CONTINUITY EQUATION: 2.31 CM2
MV VALVE AREA P 1/2 METHOD: 3.33 CM2
PISA TR MAX VEL: 2.65 M/S
PV PEAK VELOCITY: 1.47 CM/S
RA PRESSURE: 3 MMHG
RIGHT VENTRICULAR END-DIASTOLIC DIMENSION: 3.35 CM
TDI LATERAL: 0.09 M/S
TDI SEPTAL: 0.06 M/S
TDI: 0.08 M/S
TR MAX PG: 28 MMHG
TRICUSPID ANNULAR PLANE SYSTOLIC EXCURSION: 2.14 CM
TV REST PULMONARY ARTERY PRESSURE: 31 MMHG

## 2022-08-29 ENCOUNTER — TELEPHONE (OUTPATIENT)
Dept: NEUROSURGERY | Facility: CLINIC | Age: 57
End: 2022-08-29
Payer: COMMERCIAL

## 2022-08-29 NOTE — TELEPHONE ENCOUNTER
Per a reminder I had set in May; patient to have cervical XR done this month on 08/26 and you were to call with results. Images are now in Epic.

## 2022-09-02 NOTE — TELEPHONE ENCOUNTER
I spoke to the patient.  His neck is feeling okay.  He is tired, recovering from COVID.  He spent 3 days in the hospital.  The x-rays look okay.  He is not fused yet.  He will continue with the bone growth stimulator.  He will return for follow up at 1 year post op.

## 2022-09-08 ENCOUNTER — INFUSION (OUTPATIENT)
Dept: INFUSION THERAPY | Facility: HOSPITAL | Age: 57
End: 2022-09-08
Attending: INTERNAL MEDICINE
Payer: COMMERCIAL

## 2022-09-08 VITALS
BODY MASS INDEX: 35.16 KG/M2 | SYSTOLIC BLOOD PRESSURE: 131 MMHG | HEART RATE: 84 BPM | WEIGHT: 224 LBS | HEIGHT: 67 IN | DIASTOLIC BLOOD PRESSURE: 60 MMHG

## 2022-09-08 DIAGNOSIS — N18.5 ANEMIA DUE TO STAGE 5 CHRONIC KIDNEY DISEASE, NOT ON CHRONIC DIALYSIS: Primary | ICD-10-CM

## 2022-09-08 DIAGNOSIS — D63.1 ANEMIA DUE TO STAGE 5 CHRONIC KIDNEY DISEASE, NOT ON CHRONIC DIALYSIS: Primary | ICD-10-CM

## 2022-09-08 LAB
BASOPHILS # BLD AUTO: 0.05 X10(3)/MCL (ref 0–0.2)
BASOPHILS NFR BLD AUTO: 0.7 %
EOSINOPHIL # BLD AUTO: 0.22 X10(3)/MCL (ref 0–0.9)
EOSINOPHIL NFR BLD AUTO: 3 %
ERYTHROCYTE [DISTWIDTH] IN BLOOD BY AUTOMATED COUNT: 14.7 % (ref 11.5–17)
HCT VFR BLD AUTO: 31.6 % (ref 42–52)
HGB BLD-MCNC: 9.9 GM/DL (ref 14–18)
IMM GRANULOCYTES # BLD AUTO: 0.01 X10(3)/MCL (ref 0–0.04)
IMM GRANULOCYTES NFR BLD AUTO: 0.1 %
LYMPHOCYTES # BLD AUTO: 0.93 X10(3)/MCL (ref 0.6–4.6)
LYMPHOCYTES NFR BLD AUTO: 12.9 %
MCH RBC QN AUTO: 26.8 PG (ref 27–31)
MCHC RBC AUTO-ENTMCNC: 31.3 MG/DL (ref 33–36)
MCV RBC AUTO: 85.4 FL (ref 80–94)
MONOCYTES # BLD AUTO: 0.53 X10(3)/MCL (ref 0.1–1.3)
MONOCYTES NFR BLD AUTO: 7.3 %
NEUTROPHILS # BLD AUTO: 5.5 X10(3)/MCL (ref 2.1–9.2)
NEUTROPHILS NFR BLD AUTO: 76 %
PLATELET # BLD AUTO: 252 X10(3)/MCL (ref 130–400)
PMV BLD AUTO: 9.4 FL (ref 7.4–10.4)
RBC # BLD AUTO: 3.7 X10(6)/MCL (ref 4.7–6.1)
WBC # SPEC AUTO: 7.2 X10(3)/MCL (ref 4.5–11.5)

## 2022-09-08 PROCEDURE — 96372 THER/PROPH/DIAG INJ SC/IM: CPT

## 2022-09-08 PROCEDURE — 36415 COLL VENOUS BLD VENIPUNCTURE: CPT

## 2022-09-08 PROCEDURE — 63600175 PHARM REV CODE 636 W HCPCS: Mod: JG | Performed by: INTERNAL MEDICINE

## 2022-09-08 PROCEDURE — 85025 COMPLETE CBC W/AUTO DIFF WBC: CPT

## 2022-09-08 RX ADMIN — EPOETIN ALFA-EPBX 20000 UNITS: 20000 INJECTION, SOLUTION INTRAVENOUS; SUBCUTANEOUS at 03:09

## 2022-09-13 ENCOUNTER — HOSPITAL ENCOUNTER (OUTPATIENT)
Dept: RADIOLOGY | Facility: HOSPITAL | Age: 57
Discharge: HOME | End: 2022-09-13
Attending: INTERNAL MEDICINE
Payer: COMMERCIAL

## 2022-09-13 ENCOUNTER — OFFICE VISIT (OUTPATIENT)
Dept: NEPHROLOGY | Facility: CLINIC | Age: 57
DRG: 640 | End: 2022-09-13
Payer: COMMERCIAL

## 2022-09-13 ENCOUNTER — HOSPITAL ENCOUNTER (INPATIENT)
Facility: HOSPITAL | Age: 57
LOS: 3 days | Discharge: HOME | DRG: 640 | End: 2022-09-16
Attending: EMERGENCY MEDICINE | Admitting: INTERNAL MEDICINE
Payer: COMMERCIAL

## 2022-09-13 VITALS
HEIGHT: 67 IN | OXYGEN SATURATION: 90 % | BODY MASS INDEX: 35.75 KG/M2 | RESPIRATION RATE: 20 BRPM | DIASTOLIC BLOOD PRESSURE: 68 MMHG | TEMPERATURE: 98 F | WEIGHT: 227.75 LBS | SYSTOLIC BLOOD PRESSURE: 144 MMHG | HEART RATE: 76 BPM

## 2022-09-13 DIAGNOSIS — R07.9 CHEST PAIN: ICD-10-CM

## 2022-09-13 DIAGNOSIS — N18.5 ANEMIA DUE TO STAGE 5 CHRONIC KIDNEY DISEASE, NOT ON CHRONIC DIALYSIS: ICD-10-CM

## 2022-09-13 DIAGNOSIS — R06.02 SHORTNESS OF BREATH: ICD-10-CM

## 2022-09-13 DIAGNOSIS — D63.1 ANEMIA DUE TO STAGE 5 CHRONIC KIDNEY DISEASE, NOT ON CHRONIC DIALYSIS: ICD-10-CM

## 2022-09-13 DIAGNOSIS — N18.5 CKD (CHRONIC KIDNEY DISEASE) STAGE 5, GFR LESS THAN 15 ML/MIN: Primary | ICD-10-CM

## 2022-09-13 DIAGNOSIS — N18.9 CHRONIC KIDNEY DISEASE, UNSPECIFIED CKD STAGE: ICD-10-CM

## 2022-09-13 DIAGNOSIS — N18.4 CKD (CHRONIC KIDNEY DISEASE) STAGE 4, GFR 15-29 ML/MIN: ICD-10-CM

## 2022-09-13 DIAGNOSIS — D63.1 ANEMIA IN CHRONIC KIDNEY DISEASE, UNSPECIFIED CKD STAGE: ICD-10-CM

## 2022-09-13 DIAGNOSIS — I16.1 HYPERTENSIVE EMERGENCY: ICD-10-CM

## 2022-09-13 DIAGNOSIS — R91.1 SOLITARY PULMONARY NODULE: ICD-10-CM

## 2022-09-13 DIAGNOSIS — D50.0 IRON DEFICIENCY ANEMIA DUE TO CHRONIC BLOOD LOSS: ICD-10-CM

## 2022-09-13 DIAGNOSIS — N18.5 CKD (CHRONIC KIDNEY DISEASE), STAGE V: ICD-10-CM

## 2022-09-13 DIAGNOSIS — U07.1 COVID-19 VIRUS INFECTION: ICD-10-CM

## 2022-09-13 DIAGNOSIS — N18.6 ESRD (END STAGE RENAL DISEASE): ICD-10-CM

## 2022-09-13 DIAGNOSIS — E87.70 HYPERVOLEMIA, UNSPECIFIED HYPERVOLEMIA TYPE: Primary | ICD-10-CM

## 2022-09-13 DIAGNOSIS — E83.42 HYPOMAGNESEMIA: ICD-10-CM

## 2022-09-13 DIAGNOSIS — E83.51 HYPOCALCEMIA: ICD-10-CM

## 2022-09-13 DIAGNOSIS — N18.9 ANEMIA IN CHRONIC KIDNEY DISEASE, UNSPECIFIED CKD STAGE: ICD-10-CM

## 2022-09-13 LAB
ALBUMIN SERPL-MCNC: 3.3 GM/DL (ref 3.5–5)
ALBUMIN/GLOB SERPL: 0.8 RATIO (ref 1.1–2)
ALP SERPL-CCNC: 67 UNIT/L (ref 40–150)
ALT SERPL-CCNC: 17 UNIT/L (ref 0–55)
AST SERPL-CCNC: 24 UNIT/L (ref 5–34)
BASOPHILS # BLD AUTO: 0.07 X10(3)/MCL (ref 0–0.2)
BASOPHILS NFR BLD AUTO: 1.1 %
BILIRUBIN DIRECT+TOT PNL SERPL-MCNC: 0.6 MG/DL
BNP BLD-MCNC: 131.6 PG/ML
BUN SERPL-MCNC: 41 MG/DL (ref 8.4–25.7)
CALCIUM SERPL-MCNC: 5.3 MG/DL (ref 8.4–10.2)
CHLORIDE SERPL-SCNC: 106 MMOL/L (ref 98–107)
CO2 SERPL-SCNC: 19 MMOL/L (ref 22–29)
CREAT SERPL-MCNC: 4.57 MG/DL (ref 0.73–1.18)
EOSINOPHIL # BLD AUTO: 0.15 X10(3)/MCL (ref 0–0.9)
EOSINOPHIL NFR BLD AUTO: 2.4 %
ERYTHROCYTE [DISTWIDTH] IN BLOOD BY AUTOMATED COUNT: 15.3 % (ref 11.5–17)
FLUAV AG UPPER RESP QL IA.RAPID: NOT DETECTED
FLUBV AG UPPER RESP QL IA.RAPID: NOT DETECTED
GFR SERPLBLD CREATININE-BSD FMLA CKD-EPI: 14 MLS/MIN/1.73/M2
GLOBULIN SER-MCNC: 3.9 GM/DL (ref 2.4–3.5)
GLUCOSE SERPL-MCNC: 143 MG/DL (ref 74–100)
HCT VFR BLD AUTO: 29.4 % (ref 42–52)
HGB BLD-MCNC: 9.1 GM/DL (ref 14–18)
IMM GRANULOCYTES # BLD AUTO: 0.03 X10(3)/MCL (ref 0–0.04)
IMM GRANULOCYTES NFR BLD AUTO: 0.5 %
LYMPHOCYTES # BLD AUTO: 0.7 X10(3)/MCL (ref 0.6–4.6)
LYMPHOCYTES NFR BLD AUTO: 11.1 %
MAGNESIUM SERPL-MCNC: <0.6 MG/DL (ref 1.6–2.6)
MCH RBC QN AUTO: 27 PG (ref 27–31)
MCHC RBC AUTO-ENTMCNC: 31 MG/DL (ref 33–36)
MCV RBC AUTO: 87.2 FL (ref 80–94)
MONOCYTES # BLD AUTO: 0.5 X10(3)/MCL (ref 0.1–1.3)
MONOCYTES NFR BLD AUTO: 7.9 %
NEUTROPHILS # BLD AUTO: 4.8 X10(3)/MCL (ref 2.1–9.2)
NEUTROPHILS NFR BLD AUTO: 77 %
NRBC BLD AUTO-RTO: 0 %
PLATELET # BLD AUTO: 272 X10(3)/MCL (ref 130–400)
PMV BLD AUTO: 10.6 FL (ref 7.4–10.4)
POCT GLUCOSE: 158 MG/DL (ref 70–110)
POTASSIUM SERPL-SCNC: 4.4 MMOL/L (ref 3.5–5.1)
PROT SERPL-MCNC: 7.2 GM/DL (ref 6.4–8.3)
RBC # BLD AUTO: 3.37 X10(6)/MCL (ref 4.7–6.1)
SARS-COV-2 RNA RESP QL NAA+PROBE: NOT DETECTED
SODIUM SERPL-SCNC: 138 MMOL/L (ref 136–145)
TROPONIN I SERPL-MCNC: 0.04 NG/ML (ref 0–0.04)
WBC # SPEC AUTO: 6.3 X10(3)/MCL (ref 4.5–11.5)

## 2022-09-13 PROCEDURE — 96365 THER/PROPH/DIAG IV INF INIT: CPT

## 2022-09-13 PROCEDURE — 84484 ASSAY OF TROPONIN QUANT: CPT | Performed by: NURSE PRACTITIONER

## 2022-09-13 PROCEDURE — 87636 SARSCOV2 & INF A&B AMP PRB: CPT | Performed by: EMERGENCY MEDICINE

## 2022-09-13 PROCEDURE — 80053 COMPREHEN METABOLIC PANEL: CPT | Performed by: NURSE PRACTITIONER

## 2022-09-13 PROCEDURE — 96367 TX/PROPH/DG ADDL SEQ IV INF: CPT

## 2022-09-13 PROCEDURE — 25000003 PHARM REV CODE 250: Performed by: EMERGENCY MEDICINE

## 2022-09-13 PROCEDURE — 25000003 PHARM REV CODE 250: Performed by: INTERNAL MEDICINE

## 2022-09-13 PROCEDURE — 99214 PR OFFICE/OUTPT VISIT, EST, LEVL IV, 30-39 MIN: ICD-10-PCS | Mod: S$GLB,,, | Performed by: INTERNAL MEDICINE

## 2022-09-13 PROCEDURE — 96375 TX/PRO/DX INJ NEW DRUG ADDON: CPT

## 2022-09-13 PROCEDURE — 83880 ASSAY OF NATRIURETIC PEPTIDE: CPT | Performed by: NURSE PRACTITIONER

## 2022-09-13 PROCEDURE — 93005 ELECTROCARDIOGRAM TRACING: CPT

## 2022-09-13 PROCEDURE — 93010 ELECTROCARDIOGRAM REPORT: CPT | Mod: ,,, | Performed by: INTERNAL MEDICINE

## 2022-09-13 PROCEDURE — 85025 COMPLETE CBC W/AUTO DIFF WBC: CPT | Performed by: NURSE PRACTITIONER

## 2022-09-13 PROCEDURE — 21400001 HC TELEMETRY ROOM

## 2022-09-13 PROCEDURE — 99285 EMERGENCY DEPT VISIT HI MDM: CPT | Mod: 25

## 2022-09-13 PROCEDURE — 71250 CT THORAX DX C-: CPT | Mod: TC

## 2022-09-13 PROCEDURE — 27000221 HC OXYGEN, UP TO 24 HOURS

## 2022-09-13 PROCEDURE — 94761 N-INVAS EAR/PLS OXIMETRY MLT: CPT

## 2022-09-13 PROCEDURE — 36415 COLL VENOUS BLD VENIPUNCTURE: CPT | Performed by: NURSE PRACTITIONER

## 2022-09-13 PROCEDURE — 93010 EKG 12-LEAD: ICD-10-PCS | Mod: ,,, | Performed by: INTERNAL MEDICINE

## 2022-09-13 PROCEDURE — 63600175 PHARM REV CODE 636 W HCPCS: Performed by: INTERNAL MEDICINE

## 2022-09-13 PROCEDURE — 99214 OFFICE O/P EST MOD 30 MIN: CPT | Mod: S$GLB,,, | Performed by: INTERNAL MEDICINE

## 2022-09-13 PROCEDURE — 63600175 PHARM REV CODE 636 W HCPCS: Performed by: EMERGENCY MEDICINE

## 2022-09-13 PROCEDURE — 83735 ASSAY OF MAGNESIUM: CPT | Performed by: NURSE PRACTITIONER

## 2022-09-13 PROCEDURE — 99999 PR PBB SHADOW E&M-EST. PATIENT-LVL V: CPT | Mod: PBBFAC,,, | Performed by: INTERNAL MEDICINE

## 2022-09-13 PROCEDURE — 99999 PR PBB SHADOW E&M-EST. PATIENT-LVL V: ICD-10-PCS | Mod: PBBFAC,,, | Performed by: INTERNAL MEDICINE

## 2022-09-13 RX ORDER — CALCIUM CARBONATE 200(500)MG
1000 TABLET,CHEWABLE ORAL EVERY 8 HOURS
Status: DISCONTINUED | OUTPATIENT
Start: 2022-09-13 | End: 2022-09-13

## 2022-09-13 RX ORDER — FUROSEMIDE 10 MG/ML
80 INJECTION INTRAMUSCULAR; INTRAVENOUS
Status: DISCONTINUED | OUTPATIENT
Start: 2022-09-13 | End: 2022-09-14

## 2022-09-13 RX ORDER — TALC
6 POWDER (GRAM) TOPICAL NIGHTLY PRN
Status: DISCONTINUED | OUTPATIENT
Start: 2022-09-13 | End: 2022-09-16 | Stop reason: HOSPADM

## 2022-09-13 RX ORDER — LABETALOL HYDROCHLORIDE 5 MG/ML
10 INJECTION, SOLUTION INTRAVENOUS EVERY 4 HOURS PRN
Status: DISCONTINUED | OUTPATIENT
Start: 2022-09-13 | End: 2022-09-16 | Stop reason: HOSPADM

## 2022-09-13 RX ORDER — LANOLIN ALCOHOL/MO/W.PET/CERES
1 CREAM (GRAM) TOPICAL DAILY
Status: DISCONTINUED | OUTPATIENT
Start: 2022-09-14 | End: 2022-09-16 | Stop reason: HOSPADM

## 2022-09-13 RX ORDER — HYDRALAZINE HYDROCHLORIDE 50 MG/1
50 TABLET, FILM COATED ORAL 3 TIMES DAILY
Status: DISCONTINUED | OUTPATIENT
Start: 2022-09-14 | End: 2022-09-16 | Stop reason: HOSPADM

## 2022-09-13 RX ORDER — CYCLOBENZAPRINE HCL 10 MG
TABLET ORAL
Status: ON HOLD | COMMUNITY
Start: 2022-08-09 | End: 2022-09-16 | Stop reason: HOSPADM

## 2022-09-13 RX ORDER — CALCIUM CARBONATE 200(500)MG
2000 TABLET,CHEWABLE ORAL EVERY 8 HOURS
Status: DISCONTINUED | OUTPATIENT
Start: 2022-09-14 | End: 2022-09-16

## 2022-09-13 RX ORDER — LANOLIN ALCOHOL/MO/W.PET/CERES
800 CREAM (GRAM) TOPICAL 3 TIMES DAILY
Status: DISCONTINUED | OUTPATIENT
Start: 2022-09-13 | End: 2022-09-16

## 2022-09-13 RX ORDER — LABETALOL 200 MG/1
200 TABLET, FILM COATED ORAL 2 TIMES DAILY
Status: DISCONTINUED | OUTPATIENT
Start: 2022-09-13 | End: 2022-09-16 | Stop reason: HOSPADM

## 2022-09-13 RX ORDER — CALCITRIOL 0.5 UG/1
0.5 CAPSULE ORAL DAILY
Status: DISCONTINUED | OUTPATIENT
Start: 2022-09-14 | End: 2022-09-16 | Stop reason: HOSPADM

## 2022-09-13 RX ORDER — MAGNESIUM SULFATE HEPTAHYDRATE 40 MG/ML
2 INJECTION, SOLUTION INTRAVENOUS
Status: COMPLETED | OUTPATIENT
Start: 2022-09-13 | End: 2022-09-13

## 2022-09-13 RX ORDER — PANTOPRAZOLE SODIUM 40 MG/1
40 TABLET, DELAYED RELEASE ORAL DAILY
Status: DISCONTINUED | OUTPATIENT
Start: 2022-09-14 | End: 2022-09-16 | Stop reason: HOSPADM

## 2022-09-13 RX ORDER — CALCIUM GLUCONATE 20 MG/ML
1 INJECTION, SOLUTION INTRAVENOUS
Status: COMPLETED | OUTPATIENT
Start: 2022-09-13 | End: 2022-09-13

## 2022-09-13 RX ORDER — AMOXICILLIN 250 MG
1 CAPSULE ORAL 2 TIMES DAILY PRN
Status: DISCONTINUED | OUTPATIENT
Start: 2022-09-13 | End: 2022-09-16 | Stop reason: HOSPADM

## 2022-09-13 RX ORDER — GLUCAGON 1 MG
1 KIT INJECTION
Status: DISCONTINUED | OUTPATIENT
Start: 2022-09-13 | End: 2022-09-16 | Stop reason: HOSPADM

## 2022-09-13 RX ORDER — MAGNESIUM SULFATE HEPTAHYDRATE 40 MG/ML
4 INJECTION, SOLUTION INTRAVENOUS ONCE
Status: COMPLETED | OUTPATIENT
Start: 2022-09-13 | End: 2022-09-14

## 2022-09-13 RX ORDER — INSULIN ASPART 100 [IU]/ML
1-10 INJECTION, SOLUTION INTRAVENOUS; SUBCUTANEOUS
Status: DISCONTINUED | OUTPATIENT
Start: 2022-09-13 | End: 2022-09-16 | Stop reason: HOSPADM

## 2022-09-13 RX ORDER — SIMETHICONE 80 MG
1 TABLET,CHEWABLE ORAL 4 TIMES DAILY PRN
Status: DISCONTINUED | OUTPATIENT
Start: 2022-09-13 | End: 2022-09-16 | Stop reason: HOSPADM

## 2022-09-13 RX ORDER — CLONIDINE HYDROCHLORIDE 0.2 MG/1
0.2 TABLET ORAL 3 TIMES DAILY PRN
Status: DISCONTINUED | OUTPATIENT
Start: 2022-09-13 | End: 2022-09-16 | Stop reason: HOSPADM

## 2022-09-13 RX ORDER — POLYETHYLENE GLYCOL 3350 17 G/17G
17 POWDER, FOR SOLUTION ORAL 2 TIMES DAILY PRN
Status: DISCONTINUED | OUTPATIENT
Start: 2022-09-13 | End: 2022-09-16 | Stop reason: HOSPADM

## 2022-09-13 RX ORDER — NIFEDIPINE 60 MG/1
60 TABLET, EXTENDED RELEASE ORAL 2 TIMES DAILY
Status: DISCONTINUED | OUTPATIENT
Start: 2022-09-14 | End: 2022-09-16 | Stop reason: HOSPADM

## 2022-09-13 RX ORDER — ESCITALOPRAM OXALATE 10 MG/1
10 TABLET ORAL DAILY
Status: DISCONTINUED | OUTPATIENT
Start: 2022-09-14 | End: 2022-09-16 | Stop reason: HOSPADM

## 2022-09-13 RX ORDER — HYDRALAZINE HYDROCHLORIDE 20 MG/ML
10 INJECTION INTRAMUSCULAR; INTRAVENOUS EVERY 4 HOURS PRN
Status: DISCONTINUED | OUTPATIENT
Start: 2022-09-13 | End: 2022-09-16 | Stop reason: HOSPADM

## 2022-09-13 RX ORDER — IBUPROFEN 200 MG
24 TABLET ORAL
Status: DISCONTINUED | OUTPATIENT
Start: 2022-09-13 | End: 2022-09-16 | Stop reason: HOSPADM

## 2022-09-13 RX ORDER — ATORVASTATIN CALCIUM 40 MG/1
40 TABLET, FILM COATED ORAL NIGHTLY
Status: DISCONTINUED | OUTPATIENT
Start: 2022-09-14 | End: 2022-09-16 | Stop reason: HOSPADM

## 2022-09-13 RX ORDER — MAG HYDROX/ALUMINUM HYD/SIMETH 200-200-20
30 SUSPENSION, ORAL (FINAL DOSE FORM) ORAL 4 TIMES DAILY PRN
Status: DISCONTINUED | OUTPATIENT
Start: 2022-09-13 | End: 2022-09-16 | Stop reason: HOSPADM

## 2022-09-13 RX ORDER — ACETAMINOPHEN 500 MG
1000 TABLET ORAL EVERY 6 HOURS PRN
Status: DISCONTINUED | OUTPATIENT
Start: 2022-09-13 | End: 2022-09-16 | Stop reason: HOSPADM

## 2022-09-13 RX ORDER — ENOXAPARIN SODIUM 100 MG/ML
30 INJECTION SUBCUTANEOUS EVERY 24 HOURS
Status: DISCONTINUED | OUTPATIENT
Start: 2022-09-14 | End: 2022-09-16 | Stop reason: HOSPADM

## 2022-09-13 RX ORDER — CALCITRIOL 0.25 UG/1
0.5 CAPSULE ORAL DAILY
Status: DISCONTINUED | OUTPATIENT
Start: 2022-09-13 | End: 2022-09-13

## 2022-09-13 RX ORDER — SODIUM BICARBONATE 650 MG/1
650 TABLET ORAL 2 TIMES DAILY
Status: DISCONTINUED | OUTPATIENT
Start: 2022-09-13 | End: 2022-09-13

## 2022-09-13 RX ORDER — ISOSORBIDE MONONITRATE 30 MG/1
30 TABLET, EXTENDED RELEASE ORAL DAILY
Status: DISCONTINUED | OUTPATIENT
Start: 2022-09-14 | End: 2022-09-16 | Stop reason: HOSPADM

## 2022-09-13 RX ORDER — SODIUM BICARBONATE 650 MG/1
1300 TABLET ORAL 2 TIMES DAILY
Status: DISCONTINUED | OUTPATIENT
Start: 2022-09-13 | End: 2022-09-16

## 2022-09-13 RX ORDER — SODIUM CHLORIDE 0.9 % (FLUSH) 0.9 %
10 SYRINGE (ML) INJECTION
Status: DISCONTINUED | OUTPATIENT
Start: 2022-09-13 | End: 2022-09-16 | Stop reason: HOSPADM

## 2022-09-13 RX ORDER — IBUPROFEN 200 MG
16 TABLET ORAL
Status: DISCONTINUED | OUTPATIENT
Start: 2022-09-13 | End: 2022-09-16 | Stop reason: HOSPADM

## 2022-09-13 RX ORDER — CLOPIDOGREL BISULFATE 75 MG/1
75 TABLET ORAL DAILY
Status: DISCONTINUED | OUTPATIENT
Start: 2022-09-14 | End: 2022-09-16 | Stop reason: HOSPADM

## 2022-09-13 RX ORDER — CALCIUM CARBONATE 200(500)MG
1000 TABLET,CHEWABLE ORAL ONCE
Status: COMPLETED | OUTPATIENT
Start: 2022-09-13 | End: 2022-09-13

## 2022-09-13 RX ORDER — ACETAMINOPHEN 325 MG/1
650 TABLET ORAL EVERY 4 HOURS PRN
Status: DISCONTINUED | OUTPATIENT
Start: 2022-09-13 | End: 2022-09-16 | Stop reason: HOSPADM

## 2022-09-13 RX ORDER — CALCIUM GLUCONATE 20 MG/ML
1 INJECTION, SOLUTION INTRAVENOUS ONCE
Status: COMPLETED | OUTPATIENT
Start: 2022-09-13 | End: 2022-09-13

## 2022-09-13 RX ORDER — PROCHLORPERAZINE EDISYLATE 5 MG/ML
5 INJECTION INTRAMUSCULAR; INTRAVENOUS EVERY 6 HOURS PRN
Status: DISCONTINUED | OUTPATIENT
Start: 2022-09-13 | End: 2022-09-16 | Stop reason: HOSPADM

## 2022-09-13 RX ORDER — ONDANSETRON 2 MG/ML
4 INJECTION INTRAMUSCULAR; INTRAVENOUS EVERY 4 HOURS PRN
Status: DISCONTINUED | OUTPATIENT
Start: 2022-09-13 | End: 2022-09-16 | Stop reason: HOSPADM

## 2022-09-13 RX ADMIN — CALCIUM GLUCONATE 1 G: 20 INJECTION, SOLUTION INTRAVENOUS at 04:09

## 2022-09-13 RX ADMIN — CALCIUM CARBONATE (ANTACID) CHEW TAB 500 MG 1000 MG: 500 CHEW TAB at 09:09

## 2022-09-13 RX ADMIN — CALCIUM CARBONATE (ANTACID) CHEW TAB 500 MG 1000 MG: 500 CHEW TAB at 11:09

## 2022-09-13 RX ADMIN — Medication 800 MG: at 09:09

## 2022-09-13 RX ADMIN — LABETALOL HYDROCHLORIDE 200 MG: 200 TABLET, FILM COATED ORAL at 11:09

## 2022-09-13 RX ADMIN — SODIUM BICARBONATE 650 MG TABLET 1300 MG: at 11:09

## 2022-09-13 RX ADMIN — CALCIUM GLUCONATE 1 G: 20 INJECTION, SOLUTION INTRAVENOUS at 07:09

## 2022-09-13 RX ADMIN — MAGNESIUM SULFATE HEPTAHYDRATE 4 G: 40 INJECTION, SOLUTION INTRAVENOUS at 11:09

## 2022-09-13 RX ADMIN — FUROSEMIDE 80 MG: 10 INJECTION, SOLUTION INTRAMUSCULAR; INTRAVENOUS at 06:09

## 2022-09-13 RX ADMIN — MAGNESIUM SULFATE HEPTAHYDRATE 2 G: 40 INJECTION, SOLUTION INTRAVENOUS at 05:09

## 2022-09-13 NOTE — ED PROVIDER NOTES
Encounter Date: 9/13/2022    SCRIBE #1 NOTE: I, Lisa Aleman, am scribing for, and in the presence of,  Dottie Lilly MD. I have scribed the following portions of the note - Other sections scribed: HPI, ROS, PE.     History     Chief Complaint   Patient presents with    Weakness    Shortness of Breath     Pt presents c/o weakness with sob. Onset x2 days. PMH kidney dz with fistula placement for future dialysis/ has not started yet.  Recent covid +/ 3 weeks ago.      I assumed care of this patient once he was placed in examination room around 4:10 p.m..      56-year-old male with history of hypertension, hyperlipidemia, COPD, CKD presents the emergency department on recommendation of his primary care physician after outpatient labs demonstrated multiple electrolyte derangements including hypocalcemia. Pt was recently diagnosed with COVID and complains of increasing generalized weakness and shortness of breath.     Nephrologist- Dr. Lozada    The history is provided by the patient. No  was used.   Illness   The current episode started today. The problem occurs rarely. The problem has been unchanged. Nothing relieves the symptoms. Nothing aggravates the symptoms. Associated symptoms include shortness of breath. Pertinent negatives include no fever, no abdominal pain, no diarrhea, no nausea, no vomiting, no congestion, no headaches, no sore throat, no cough and no rash. Services received include tests performed. Recently, medical care has been given by the PCP.   Review of patient's allergies indicates:  No Known Allergies  Past Medical History:   Diagnosis Date    Anemia     Anxiety     CAD (coronary artery disease)     CKD (chronic kidney disease) stage 4, GFR 15-29 ml/min     COPD (chronic obstructive pulmonary disease)     DDD (degenerative disc disease), cervical and lumbar s/p surgery     GERD (gastroesophageal reflux disease)     HTN (hypertension)     Hyperlipidemia     SHAYLEE on CPAP      PSVT (paroxysmal supraventricular tachycardia)     TIA (transient ischemic attack)     Type 2 diabetes mellitus      Past Surgical History:   Procedure Laterality Date    ANTERIOR CERVICAL DISCECTOMY W/ FUSION  11/26/2021    C5-6, C6-7 ACDF- Dr. Jett    AV FISTULA PLACEMENT      CARPAL TUNNEL RELEASE Bilateral     bilateral CTR & left UND- 2004 & 2020    COLONOSCOPY      CORONARY STENT PLACEMENT  2018    RCA    ELBOW SURGERY Bilateral     FUNCTIONAL ENDOSCOPIC SINUS SURGERY (FESS)      INGUINAL HERNIA REPAIR      MICRODISCECTOMY OF SPINE  04/17/2015    L4-5. L5-S1 decompression; left L4-5 microdiscectomy- Dr. Amaro    MYRINGOTOMY W/ TUBES      NASAL SEPTOPLASTY      SINUS ENDOSCOPY      TONSILLECTOMY, ADENOIDECTOMY      VASECTOMY       Family History   Problem Relation Age of Onset    Multiple sclerosis Daughter      Social History     Tobacco Use    Smoking status: Former     Packs/day: 2.00     Years: 37.00     Pack years: 74.00     Types: Cigarettes    Smokeless tobacco: Never    Tobacco comments:     Quit smoking cigarettes 6 years ago.  Vaped for 3 years.  Stopped completely 3 years ago   Substance Use Topics    Alcohol use: Never    Drug use: Never     Review of Systems   Constitutional:  Negative for chills, fatigue and fever.        Generalized weakness   HENT:  Negative for congestion and sore throat.    Eyes:  Negative for visual disturbance.   Respiratory:  Positive for shortness of breath. Negative for cough.    Cardiovascular:  Negative for chest pain.   Gastrointestinal:  Negative for abdominal pain, diarrhea, nausea and vomiting.   Genitourinary:  Negative for dysuria.   Musculoskeletal:  Negative for myalgias.   Skin:  Negative for rash.   Neurological:  Negative for numbness and headaches.   All other systems reviewed and are negative.    Physical Exam     Initial Vitals [09/13/22 1423]   BP Pulse Resp Temp SpO2   119/65 78 18 98.6 °F (37 °C) (!) 93 %      MAP       --         Physical  Exam    Nursing note and vitals reviewed.  Constitutional: He appears well-developed and well-nourished.   HENT:   Head: Normocephalic and atraumatic.   Right Ear: Tympanic membrane and external ear normal.   Left Ear: Tympanic membrane and external ear normal.   Nose: Nose normal.   Mouth/Throat: Oropharynx is clear and moist and mucous membranes are normal. Oral lesions: moist muc memb.   Eyes: Conjunctivae and EOM are normal. Pupils are equal, round, and reactive to light.   Neck: Neck supple. No thyromegaly present. No tracheal deviation present. No JVD present.   Normal range of motion.  Cardiovascular:  Normal rate, regular rhythm, normal heart sounds and intact distal pulses.     Exam reveals no gallop and no friction rub.       No murmur heard.  Pulmonary/Chest: No stridor. Tachypnea noted. No respiratory distress. He has decreased breath sounds (bilaterally). He has no wheezes. He has no rhonchi. He has no rales. He exhibits no tenderness.   Abdominal: Abdomen is soft. Bowel sounds are normal. He exhibits no distension and no mass. No signs of injury. There is no abdominal tenderness.   Musculoskeletal:         General: No tenderness or edema. Normal range of motion.      Cervical back: Normal range of motion and neck supple.      Comments: Left forearm AV fistula with palpable thrill       Lymphadenopathy:     He has no cervical adenopathy.   Neurological: He is alert and oriented to person, place, and time. He has normal strength. No cranial nerve deficit or sensory deficit. GCS score is 15. GCS eye subscore is 4. GCS verbal subscore is 5. GCS motor subscore is 6.   Skin: Skin is warm and dry. Capillary refill takes 2 to 3 seconds. No rash noted.   Psychiatric: He has a normal mood and affect. His behavior is normal. Judgment and thought content normal.       ED Course   Procedures  Labs Reviewed   COMPREHENSIVE METABOLIC PANEL - Abnormal; Notable for the following components:       Result Value    Carbon  Dioxide 19 (*)     Glucose Level 143 (*)     Blood Urea Nitrogen 41.0 (*)     Creatinine 4.57 (*)     Calcium Level Total 5.3 (*)     Albumin Level 3.3 (*)     Globulin 3.9 (*)     Albumin/Globulin Ratio 0.8 (*)     All other components within normal limits   B-TYPE NATRIURETIC PEPTIDE - Abnormal; Notable for the following components:    Natriuretic Peptide 131.6 (*)     All other components within normal limits   MAGNESIUM - Abnormal; Notable for the following components:    Magnesium Level <0.60 (*)     All other components within normal limits   CBC WITH DIFFERENTIAL - Abnormal; Notable for the following components:    RBC 3.37 (*)     Hgb 9.1 (*)     Hct 29.4 (*)     MCHC 31.0 (*)     MPV 10.6 (*)     All other components within normal limits   TROPONIN I - Normal   CBC W/ AUTO DIFFERENTIAL    Narrative:     The following orders were created for panel order CBC Auto Differential.  Procedure                               Abnormality         Status                     ---------                               -----------         ------                     CBC with Differential[[id number]]        Abnormal            Final result                 Please view results for these tests on the individual orders.   COVID/FLU A&B PCR     EKG Readings: (Independently Interpreted)   Initial Reading: No STEMI. Rhythm: Normal Sinus Rhythm. Heart Rate: 78. Conduction: Normal. ST Segments: Normal ST Segments. T Waves: Normal. Axis: Normal.   09/13/2022  @1423  Septal Q-waves, no ST elevation or depression     Imaging Results              X-Ray Chest 1 View (Final result)  Result time 09/13/22 17:26:03   Procedure changed from X-Ray Chest PA And Lateral     Final result by Ne Amaya MD (09/13/22 17:26:03)                   Impression:      Enlarged cardiac silhouette with pulmonary interstitial edema      Electronically signed by: Ne Amaya  Date:    09/13/2022  Time:    17:26               Narrative:     EXAMINATION:  XR CHEST 1 VIEW    CLINICAL HISTORY:  chest pain;    TECHNIQUE:  Single frontal view of the chest was performed.    COMPARISON:  08/15/2022    FINDINGS:  LINES AND TUBES: EKG/telemetry leads overlie the chest.    MEDIASTINUM AND WILLIAMS: Cardiac silhouette is enlarged.    LUNGS: Vascular congestion with interstitial edema.    PLEURA:No pleural effusion. No pneumothorax.    BONES: Postop ACDF.                                       Medications   magnesium sulfate 2g in water 50mL IVPB (premix) (2 g Intravenous New Bag 9/13/22 1722)   calcitRIOL capsule 0.5 mcg (has no administration in time range)   furosemide injection 80 mg (has no administration in time range)   calcium gluconate 1 g in NS IVPB (premixed) (has no administration in time range)   calcium carbonate 200 mg calcium (500 mg) chewable tablet 1,000 mg (has no administration in time range)   magnesium oxide tablet 800 mg (has no administration in time range)   calcium gluconate 1 g in NS IVPB (premixed) (0 g Intravenous Stopped 9/13/22 1722)     Medical Decision Making:   Initial Assessment:   Mr. Vera presented on recommendation of his nephrologist for evaluation/admission w/ noted profound hypocalcemia on outpatient labs. Will verify findings, plan admission for electrolyte repletion/correction of derangements. Additionally, he appears quite volume overloaded, low SpO2 on room air. CXR pending.   Differential Diagnosis:   CKD, electrolyte derangements, volume overload, pulmonary edema  Independently Interpreted Test(s):   I have ordered and independently interpreted EKG Reading(s) - see prior notes  Clinical Tests:   Lab Tests: Ordered and Reviewed       <> Summary of Lab: Anemia, hypocalcemia, metabolic acidosis, hypomagnesemia  Radiological Study: Ordered and Reviewed  Medical Tests: Ordered and Reviewed  ED Management:  CXR w/ volume overload/pulmonary vascular congestion. Labs w/ hypocalcemia but also critical hypomagnesemia. Started on  repletion on ED assessment. Case reviewed with his nephrologist who offered recommendations as below. Findings and plan discussed with the patient and he is agreeable to admission at this time.             Attending Attestation:           Physician Attestation for Scribe:  Physician Attestation Statement for Scribe #1: I, Dottie Lilly MD, reviewed documentation, as scribed by Lisa Aleman in my presence, and it is both accurate and complete.           ED Course as of 09/29/22 1820   Tue Sep 13, 2022   1740 Discussed with Dr. Lozada.  Recommends kg IV calcium, 2 g IV magnesium, Lasix 80 mg IV b.i.d., calcitriol 500 mcg daily and he will see tomorrow to determine if needs to initiate dialysis at that time. [KS]      ED Course User Index  [KS] Dottie Lilly MD                   Clinical Impression:   Final diagnoses:  [R07.9] Chest pain  [E83.51] Hypocalcemia  [E83.42] Hypomagnesemia  [N18.9] Chronic kidney disease, unspecified CKD stage  [E87.70] Hypervolemia, unspecified hypervolemia type (Primary)  [U07.1] COVID-19 virus infection        ED Disposition Condition    Admit                 Dottie Lilly MD  09/29/22 1826

## 2022-09-13 NOTE — PROGRESS NOTES
"[facility] [place] [facility] Progress Note      HPI  Nicholas Vera, 56 y.o. male,  has a past medical history of Anemia, Anxiety, CAD (coronary artery disease), CKD (chronic kidney disease) stage 4, GFR 15-29 ml/min, COPD (chronic obstructive pulmonary disease), DDD (degenerative disc disease), cervical and lumbar s/p surgery, GERD (gastroesophageal reflux disease), HTN (hypertension), Hyperlipidemia, SHAYLEE on CPAP, PSVT (paroxysmal supraventricular tachycardia), TIA (transient ischemic attack), and Type 2 diabetes mellitus. Nicholas Vera presents to office for complaints of SOB. Reports he has been having diarrhea, feelings "freezing cold" staying bundled under blankets. Generalized weakness. He had covid over 3 weeks ago. He was seen by pulmonologist this week and had CT chest  this morning for baseline due to having covid pneumonia.    Patient denies taking NSAIDs, new antibiotics or recreational drugs. Denies recent episode of dehydration, diarrhea, vomiting, acute illness, hospitalization, recent angiograms or exposure to IV radiocontrast.      Medical History:   Past Medical History:   Diagnosis Date    Anemia     Anxiety     CAD (coronary artery disease)     CKD (chronic kidney disease) stage 4, GFR 15-29 ml/min     COPD (chronic obstructive pulmonary disease)     DDD (degenerative disc disease), cervical and lumbar s/p surgery     GERD (gastroesophageal reflux disease)     HTN (hypertension)     Hyperlipidemia     SHAYLEE on CPAP     PSVT (paroxysmal supraventricular tachycardia)     TIA (transient ischemic attack)     Type 2 diabetes mellitus        Surgical History:   Past Surgical History:   Procedure Laterality Date    ANTERIOR CERVICAL DISCECTOMY W/ FUSION  11/26/2021    C5-6, C6-7 ACDF- Dr. Jett    AV FISTULA PLACEMENT      CARPAL TUNNEL RELEASE Bilateral     bilateral CTR & left UND- 2004 & 2020    COLONOSCOPY      CORONARY STENT PLACEMENT  2018    RCA    ELBOW SURGERY Bilateral     FUNCTIONAL " ENDOSCOPIC SINUS SURGERY (FESS)      INGUINAL HERNIA REPAIR      MICRODISCECTOMY OF SPINE  04/17/2015    L4-5. L5-S1 decompression; left L4-5 microdiscectomy- Dr. Amaro    MYRINGOTOMY W/ TUBES      NASAL SEPTOPLASTY      SINUS ENDOSCOPY      TONSILLECTOMY, ADENOIDECTOMY      VASECTOMY         Family History:   Family History   Problem Relation Age of Onset    Multiple sclerosis Daughter        Social History:   Social History     Tobacco Use    Smoking status: Former     Packs/day: 2.00     Years: 37.00     Pack years: 74.00     Types: Cigarettes    Smokeless tobacco: Never    Tobacco comments:     Quit smoking cigarettes 6 years ago.  Vaped for 3 years.  Stopped completely 3 years ago   Substance Use Topics    Alcohol use: Never       Allergies:  Review of patient's allergies indicates:  No Known Allergies    Medications:    Current Outpatient Medications:     albuterol (PROVENTIL/VENTOLIN HFA) 90 mcg/actuation inhaler, Inhale into the lungs., Disp: , Rfl:     ANORO ELLIPTA 62.5-25 mcg/actuation DsDv, as needed., Disp: , Rfl:     ascorbic acid, vitamin C, (VITAMIN C) 500 MG tablet, Take 500 mg by mouth once daily., Disp: , Rfl:     BASAGLAR KWIKPEN U-100 INSULIN glargine 100 units/mL (3mL) SubQ pen, Inject 25 Units into the skin once daily., Disp: , Rfl:     cholecalciferol, vitamin D3, 1,250 mcg (50,000 unit) capsule, Take 1,250 mcg by mouth once a week., Disp: , Rfl:     cholecalciferol, vitamin D3, 125 mcg (5,000 unit) Tab, Take 5,000 Units by mouth once daily., Disp: , Rfl:     clopidogreL (PLAVIX) 75 mg tablet, Take 75 mg by mouth once daily at 6am., Disp: , Rfl:     EScitalopram oxalate (LEXAPRO) 10 MG tablet, Take 10 mg by mouth once daily at 6am., Disp: , Rfl:     FARXIGA 10 mg tablet, Take 10 mg by mouth once daily at 6am., Disp: , Rfl:     fenofibrate (TRICOR) 145 MG tablet, Take 145 mg by mouth once daily at 6am., Disp: , Rfl:     glimepiride (AMARYL) 4 MG tablet, Take 2 mg by mouth 2 (two) times a  day., Disp: , Rfl:     hydrALAZINE (APRESOLINE) 100 MG tablet, Take 1 tablet (100 mg total) by mouth 3 (three) times daily., Disp: 90 tablet, Rfl: 11    isosorbide mononitrate (IMDUR) 30 MG 24 hr tablet, TAKE 1 TABLET BY MOUTH ONCE DAILY, Disp: 90 tablet, Rfl: 0    labetaloL (NORMODYNE) 200 MG tablet, Take 200 mg by mouth 2 (two) times a day., Disp: , Rfl:     multivit-mins/iron/folic/lycop (CENTRUM MEN ORAL), Take 1 tablet by mouth once daily at 6am., Disp: , Rfl:     NIFEdipine (ADALAT CC) 60 MG TbSR, Take by mouth 2 (two) times a day., Disp: , Rfl:     nitroGLYCERIN (NITROSTAT) 0.4 MG SL tablet, as needed., Disp: , Rfl:     pantoprazole (PROTONIX) 40 MG tablet, Take 40 mg by mouth once daily at 6am., Disp: , Rfl:     pulse oximeter (PULSE OXIMETER) device, Use twice daily at 8 AM and 3 PM and [facility] the value [facility] as directed.  **Please Mail to patient**, Disp: 1 each, Rfl: 0    rosuvastatin (CRESTOR) 20 MG tablet, Take 20 mg by mouth once daily., Disp: , Rfl:     sodium bicarbonate 650 MG tablet, Take 2 tablets (1,300 mg total) by mouth 2 (two) times daily., Disp: 180 tablet, Rfl: 3    terazosin (HYTRIN) 2 MG capsule, Take 2 mg by mouth 2 (two) times a day., Disp: , Rfl:     torsemide (DEMADEX) 20 MG Tab, Take 2 tablets (40 mg total) by mouth 2 (two) times a day. (Patient taking differently: Take 40 mg by mouth once daily.), Disp: 60 tablet, Rfl: 1    TRULICITY 1.5 mg/0.5 mL pen injector, once a week., Disp: , Rfl:     VASCEPA 1 gram Cap, Take 2 tablets by mouth 2 (two) times a day., Disp: , Rfl:     XYOSTED 100 mg/0.5 mL AtIn, Inject into the skin once a week., Disp: , Rfl:        ROS:    Constitutional:  +fatigue, generalized weakness, chills; +shaking hands/arms; +twitching; +cramping hands  Skin: Denies wounds, no rashes, no itching, no new skin lesions  HEENT: Denies acute change in hearing or vision, tinnitus, or dysphagia  Respiratory:  +cough, shortness of breath, or wheezing  Cardiovascular:  "Denies chest pain, palpitations, or swelling  Gastrointestional: Denies abdominal pain, nausea, vomiting, diarrhea, or constipation  Genitourinary: Denies dysuria, hematuria, or incontinence; reports able to empty bladder  Musculoskeletal: Denies myalgias, back pain, decreased ROM or focal weakness  Neurological: Denies headaches, seizures, dizziness, paresthesias or weakness  Hematological: Denies unusual bruising or bleeding  Psychiatric: Denies hallucinations, depression, or confusion      Vital Signs:  BP (!) 144/68 (BP Location: Right arm, Patient Position: Sitting)   Pulse 76   Temp 98.1 °F (36.7 °C) (Oral)   Resp 20   Ht 5' 7" (1.702 m)   Wt 103.3 kg (227 lb 11.8 oz)   SpO2 (!) 90%   BMI 35.67 kg/m²   Body mass index is 35.67 kg/m².      Physical Exam:    General: no acute distress, awake, alert  Eyes: PERRLA, EOMI, conjunctiva clear,   HENT: atraumatic, oropharynx and nasal mucosa patent  Neck: full ROM, no JVD, no thyromegaly or lymphadenopathy  Respiratory: equal, unlabored, fine basilar end exp wheezing  Cardiovascular: RRR without rub; radial and pedal pulses felt  Edema: none  Gastrointestinal: soft, non-tender, non-distended; positive bowel sounds; no masses to palpation  Musculoskeletal: full ROM without limitation or discomfort  Integumentary: warm, dry; no rashes, wounds, or skin lesions  Neurological: oriented, appropriate, occ resting tremors and twitching  Patent L arm AVF    Labs:        Component Value Date/Time     08/23/2022 1131     (L) 08/17/2022 0354     09/21/2012 1041    K 4.5 08/23/2022 1131    K 4.3 08/17/2022 0354    K 4.5 09/21/2012 1041     09/21/2012 1041    CO2 20 (L) 08/23/2022 1131    CO2 18 (L) 08/17/2022 0354    CO2 22 (L) 09/21/2012 1041    BUN 49.0 (H) 08/23/2022 1131    BUN 68.2 (H) 08/17/2022 0354    BUN 12 09/21/2012 1041    CREATININE 4.05 (H) 08/23/2022 1131    CREATININE 4.96 (H) 08/17/2022 0354    CREATININE 0.6 09/21/2012 1041    " ESTGFRAFRICA >60 09/21/2012 1041    EGFRNONAA 15 07/13/2022 1441    EGFRNONAA 18 06/22/2022 1507    EGFRNONAA >60 09/21/2012 1041    CALCIUM 8.7 08/23/2022 1131    CALCIUM 8.5 08/17/2022 0354    CALCIUM 9.4 09/21/2012 1041    PHOS 4.2 08/11/2022 1145    .0 (H) 08/11/2022 1145            Component Value Date/Time    WBC 7.2 09/08/2022 1452    WBC 8.5 08/17/2022 0353    WBC 9.26 09/21/2012 1041    HGB 9.9 (L) 09/08/2022 1452    HGB 10.6 (L) 08/17/2022 0353    HGB 13.4 (L) 09/21/2012 1041    HCT 31.6 (L) 09/08/2022 1452    HCT 33.4 (L) 08/17/2022 0353    HCT 40.2 09/21/2012 1041     09/08/2022 1452     08/17/2022 0353     09/21/2012 1041         Impression:    Patient Active Problem List   Diagnosis    Anemia due to stage 5 chronic kidney disease, not on chronic dialysis    Iron deficiency anemia due to chronic blood loss    Cervical spondylosis with myelopathy    Hypertensive emergency    COVID-19     1. CKD (chronic kidney disease) stage 5, GFR less than 15 ml/min        2. Anemia in chronic kidney disease, unspecified CKD stage        CONCERNED With WORSENING UREMIA      Plan:  Torsemide twice a day  Labs work today STAT  [facility]  Will call patient with LAB RESULTS this afternoon  If there is significant concerns, will admit to hospital to start dialysis      Avoid NSAIDs (Aleve, Mobic, Celebrex, Ibuprofen, Advil, Toradol and Diclofenac).       Guido Lozada

## 2022-09-13 NOTE — Clinical Note
Diagnosis: Hypocalcemia [275.41.ICD-9-CM]   Admitting Provider:: NARENDRA COLEMAN [[id number]]   Future Attending Provider: NARENDRA COLEMAN [[id number]]   Reason for IP Medical Treatment  (Clinical interventions that can only be accomplished in the IP setting? ) :: electrolyte repletion, initiation of dialysis   Estimated Length of Stay:: 3-4 midnights   I certify that Inpatient services for greater than or equal to 2 midnights are medically necessary:: Yes   Plans for Post-Acute care--if anticipated (pick the single best option):: A. No post acute care anticipated at this time

## 2022-09-13 NOTE — FIRST PROVIDER EVALUATION
Medical screening examination initiated.  I have conducted a focused provider triage encounter, findings are as follows:    Brief history of present illness:  Patient states SOB and that his MD told him to report to the ED because his Calcium level was low. Patient states that he had a CT of his chest done this morning at [facility].    There were no vitals filed for this visit.    Pertinent physical exam:  Awake, alert    Brief workup plan:  labs, EKG    Preliminary workup initiated; this workup will be continued and followed by the physician or advanced practice provider that is assigned to the patient when roomed.

## 2022-09-14 LAB
ALBUMIN SERPL-MCNC: 3.6 GM/DL (ref 3.5–5)
ALBUMIN/GLOB SERPL: 1 RATIO (ref 1.1–2)
ALP SERPL-CCNC: 73 UNIT/L (ref 40–150)
ALT SERPL-CCNC: 17 UNIT/L (ref 0–55)
AST SERPL-CCNC: 26 UNIT/L (ref 5–34)
BASOPHILS # BLD AUTO: 0.05 X10(3)/MCL (ref 0–0.2)
BASOPHILS NFR BLD AUTO: 0.7 %
BILIRUBIN DIRECT+TOT PNL SERPL-MCNC: 0.7 MG/DL
BUN SERPL-MCNC: 43.6 MG/DL (ref 8.4–25.7)
CALCIUM SERPL-MCNC: 6.1 MG/DL (ref 8.4–10.2)
CHLORIDE SERPL-SCNC: 104 MMOL/L (ref 98–107)
CO2 SERPL-SCNC: 22 MMOL/L (ref 22–29)
CREAT SERPL-MCNC: 5.05 MG/DL (ref 0.73–1.18)
DEPRECATED CALCIDIOL+CALCIFEROL SERPL-MC: 56.6 NG/ML (ref 30–80)
EOSINOPHIL # BLD AUTO: 0.21 X10(3)/MCL (ref 0–0.9)
EOSINOPHIL NFR BLD AUTO: 2.9 %
ERYTHROCYTE [DISTWIDTH] IN BLOOD BY AUTOMATED COUNT: 15 % (ref 11.5–17)
GFR SERPLBLD CREATININE-BSD FMLA CKD-EPI: 13 MLS/MIN/1.73/M2
GLOBULIN SER-MCNC: 3.5 GM/DL (ref 2.4–3.5)
GLUCOSE SERPL-MCNC: 56 MG/DL (ref 74–100)
HBV SURFACE AG SERPL QL IA: NONREACTIVE
HCT VFR BLD AUTO: 30.7 % (ref 42–52)
HGB BLD-MCNC: 9.8 GM/DL (ref 14–18)
IMM GRANULOCYTES # BLD AUTO: 0.01 X10(3)/MCL (ref 0–0.04)
IMM GRANULOCYTES NFR BLD AUTO: 0.1 %
LYMPHOCYTES # BLD AUTO: 0.65 X10(3)/MCL (ref 0.6–4.6)
LYMPHOCYTES NFR BLD AUTO: 9.1 %
MAGNESIUM SERPL-MCNC: 1.5 MG/DL (ref 1.6–2.6)
MCH RBC QN AUTO: 27.1 PG (ref 27–31)
MCHC RBC AUTO-ENTMCNC: 31.9 MG/DL (ref 33–36)
MCV RBC AUTO: 84.8 FL (ref 80–94)
MONOCYTES # BLD AUTO: 0.59 X10(3)/MCL (ref 0.1–1.3)
MONOCYTES NFR BLD AUTO: 8.2 %
NEUTROPHILS # BLD AUTO: 5.7 X10(3)/MCL (ref 2.1–9.2)
NEUTROPHILS NFR BLD AUTO: 79 %
NRBC BLD AUTO-RTO: 0 %
PHOSPHATE SERPL-MCNC: 5.3 MG/DL (ref 2.3–4.7)
PLATELET # BLD AUTO: 278 X10(3)/MCL (ref 130–400)
PMV BLD AUTO: 10.1 FL (ref 7.4–10.4)
POCT GLUCOSE: 157 MG/DL (ref 70–110)
POCT GLUCOSE: 47 MG/DL (ref 70–110)
POCT GLUCOSE: 49 MG/DL (ref 70–110)
POCT GLUCOSE: 73 MG/DL (ref 70–110)
POTASSIUM SERPL-SCNC: 3.8 MMOL/L (ref 3.5–5.1)
PROT SERPL-MCNC: 7.1 GM/DL (ref 6.4–8.3)
PTH-INTACT SERPL-MCNC: 268.5 PG/ML (ref 8.7–77)
RBC # BLD AUTO: 3.62 X10(6)/MCL (ref 4.7–6.1)
SODIUM SERPL-SCNC: 140 MMOL/L (ref 136–145)
WBC # SPEC AUTO: 7.2 X10(3)/MCL (ref 4.5–11.5)

## 2022-09-14 PROCEDURE — 25000003 PHARM REV CODE 250: Performed by: INTERNAL MEDICINE

## 2022-09-14 PROCEDURE — 83970 ASSAY OF PARATHORMONE: CPT | Performed by: INTERNAL MEDICINE

## 2022-09-14 PROCEDURE — 84100 ASSAY OF PHOSPHORUS: CPT | Performed by: INTERNAL MEDICINE

## 2022-09-14 PROCEDURE — 99254 PR INITIAL INPATIENT CONSULT,LEVL IV: ICD-10-PCS | Mod: ,,, | Performed by: INTERNAL MEDICINE

## 2022-09-14 PROCEDURE — 86704 HEP B CORE ANTIBODY TOTAL: CPT | Performed by: INTERNAL MEDICINE

## 2022-09-14 PROCEDURE — 87340 HEPATITIS B SURFACE AG IA: CPT | Performed by: INTERNAL MEDICINE

## 2022-09-14 PROCEDURE — 36415 COLL VENOUS BLD VENIPUNCTURE: CPT | Performed by: INTERNAL MEDICINE

## 2022-09-14 PROCEDURE — 21400001 HC TELEMETRY ROOM

## 2022-09-14 PROCEDURE — 27000221 HC OXYGEN, UP TO 24 HOURS

## 2022-09-14 PROCEDURE — 99254 IP/OBS CNSLTJ NEW/EST MOD 60: CPT | Mod: ,,, | Performed by: INTERNAL MEDICINE

## 2022-09-14 PROCEDURE — 82306 VITAMIN D 25 HYDROXY: CPT | Performed by: INTERNAL MEDICINE

## 2022-09-14 PROCEDURE — 25000242 PHARM REV CODE 250 ALT 637 W/ HCPCS: Performed by: INTERNAL MEDICINE

## 2022-09-14 PROCEDURE — 90935 HEMODIALYSIS ONE EVALUATION: CPT

## 2022-09-14 PROCEDURE — 63600175 PHARM REV CODE 636 W HCPCS: Performed by: EMERGENCY MEDICINE

## 2022-09-14 PROCEDURE — 80053 COMPREHEN METABOLIC PANEL: CPT | Performed by: INTERNAL MEDICINE

## 2022-09-14 PROCEDURE — 85025 COMPLETE CBC W/AUTO DIFF WBC: CPT | Performed by: INTERNAL MEDICINE

## 2022-09-14 PROCEDURE — 63600175 PHARM REV CODE 636 W HCPCS: Performed by: INTERNAL MEDICINE

## 2022-09-14 PROCEDURE — 83735 ASSAY OF MAGNESIUM: CPT | Performed by: INTERNAL MEDICINE

## 2022-09-14 PROCEDURE — 25000003 PHARM REV CODE 250: Performed by: EMERGENCY MEDICINE

## 2022-09-14 RX ORDER — CALCIUM GLUCONATE 20 MG/ML
1 INJECTION, SOLUTION INTRAVENOUS ONCE
Status: COMPLETED | OUTPATIENT
Start: 2022-09-14 | End: 2022-09-14

## 2022-09-14 RX ORDER — MAGNESIUM SULFATE HEPTAHYDRATE 40 MG/ML
2 INJECTION, SOLUTION INTRAVENOUS ONCE
Status: COMPLETED | OUTPATIENT
Start: 2022-09-14 | End: 2022-09-14

## 2022-09-14 RX ORDER — FUROSEMIDE 10 MG/ML
80 INJECTION INTRAMUSCULAR; INTRAVENOUS EVERY 8 HOURS
Status: DISCONTINUED | OUTPATIENT
Start: 2022-09-14 | End: 2022-09-16

## 2022-09-14 RX ADMIN — Medication 800 MG: at 12:09

## 2022-09-14 RX ADMIN — NIFEDIPINE 60 MG: 60 TABLET, FILM COATED, EXTENDED RELEASE ORAL at 09:09

## 2022-09-14 RX ADMIN — FUROSEMIDE 80 MG: 10 INJECTION, SOLUTION INTRAMUSCULAR; INTRAVENOUS at 02:09

## 2022-09-14 RX ADMIN — PANTOPRAZOLE SODIUM 40 MG: 40 TABLET, DELAYED RELEASE ORAL at 05:09

## 2022-09-14 RX ADMIN — FUROSEMIDE 80 MG: 10 INJECTION, SOLUTION INTRAMUSCULAR; INTRAVENOUS at 06:09

## 2022-09-14 RX ADMIN — CALCIUM GLUCONATE 1 G: 20 INJECTION, SOLUTION INTRAVENOUS at 09:09

## 2022-09-14 RX ADMIN — ACETAMINOPHEN 1000 MG: 500 TABLET, FILM COATED ORAL at 08:09

## 2022-09-14 RX ADMIN — NIFEDIPINE 60 MG: 60 TABLET, FILM COATED, EXTENDED RELEASE ORAL at 08:09

## 2022-09-14 RX ADMIN — CALCIUM CARBONATE (ANTACID) CHEW TAB 500 MG 2000 MG: 500 CHEW TAB at 05:09

## 2022-09-14 RX ADMIN — ACETAMINOPHEN 1000 MG: 500 TABLET, FILM COATED ORAL at 10:09

## 2022-09-14 RX ADMIN — LABETALOL HYDROCHLORIDE 200 MG: 200 TABLET, FILM COATED ORAL at 09:09

## 2022-09-14 RX ADMIN — CALCIUM CARBONATE (ANTACID) CHEW TAB 500 MG 2000 MG: 500 CHEW TAB at 11:09

## 2022-09-14 RX ADMIN — NEPHROCAP 1 CAPSULE: 1 CAP ORAL at 09:09

## 2022-09-14 RX ADMIN — SODIUM BICARBONATE 650 MG TABLET 1300 MG: at 08:09

## 2022-09-14 RX ADMIN — CALCITRIOL 0.5 MCG: 0.5 CAPSULE, LIQUID FILLED ORAL at 09:09

## 2022-09-14 RX ADMIN — LABETALOL HYDROCHLORIDE 200 MG: 200 TABLET, FILM COATED ORAL at 08:09

## 2022-09-14 RX ADMIN — CLOPIDOGREL 75 MG: 75 TABLET, FILM COATED ORAL at 09:09

## 2022-09-14 RX ADMIN — UMECLIDINIUM BROMIDE AND VILANTEROL TRIFENATATE 1 PUFF: 62.5; 25 POWDER RESPIRATORY (INHALATION) at 09:09

## 2022-09-14 RX ADMIN — FUROSEMIDE 80 MG: 10 INJECTION, SOLUTION INTRAMUSCULAR; INTRAVENOUS at 11:09

## 2022-09-14 RX ADMIN — ACETAMINOPHEN 1000 MG: 500 TABLET, FILM COATED ORAL at 03:09

## 2022-09-14 RX ADMIN — MAGNESIUM SULFATE HEPTAHYDRATE 2 G: 40 INJECTION, SOLUTION INTRAVENOUS at 12:09

## 2022-09-14 RX ADMIN — Medication 800 MG: at 08:09

## 2022-09-14 RX ADMIN — FERROUS SULFATE TAB 325 MG (65 MG ELEMENTAL FE) 1 EACH: 325 (65 FE) TAB at 09:09

## 2022-09-14 RX ADMIN — SODIUM BICARBONATE 650 MG TABLET 1300 MG: at 09:09

## 2022-09-14 RX ADMIN — HYDRALAZINE HYDROCHLORIDE 50 MG: 50 TABLET, FILM COATED ORAL at 02:09

## 2022-09-14 RX ADMIN — HYDRALAZINE HYDROCHLORIDE 50 MG: 50 TABLET, FILM COATED ORAL at 09:09

## 2022-09-14 RX ADMIN — Medication 800 MG: at 02:09

## 2022-09-14 RX ADMIN — ESCITALOPRAM OXALATE 10 MG: 10 TABLET ORAL at 09:09

## 2022-09-14 RX ADMIN — ATORVASTATIN CALCIUM 40 MG: 40 TABLET, FILM COATED ORAL at 08:09

## 2022-09-14 RX ADMIN — HYDRALAZINE HYDROCHLORIDE 50 MG: 50 TABLET, FILM COATED ORAL at 08:09

## 2022-09-14 RX ADMIN — CALCIUM CARBONATE (ANTACID) CHEW TAB 500 MG 2000 MG: 500 CHEW TAB at 02:09

## 2022-09-14 RX ADMIN — ENOXAPARIN SODIUM 30 MG: 30 INJECTION SUBCUTANEOUS at 08:09

## 2022-09-14 RX ADMIN — ISOSORBIDE MONONITRATE 30 MG: 30 TABLET, EXTENDED RELEASE ORAL at 09:09

## 2022-09-14 NOTE — CONSULTS
"[facility] New Consult Note    Patient Name: Nicholas Vera  Admission Date: 9/13/2022  Hospital Length of Stay: 1 days  Code Status: Full Code   Attending Physician: Nola Escalante MD   Primary Care Physician: Saqib Emery Jr, MD  Principal Problem:CKD (chronic kidney disease), stage V    HPI:    Nicholas Vera 56 y.o. male  has a past medical history of Anemia, Anxiety, CAD (coronary artery disease), CKD (chronic kidney disease) stage 4, GFR 15-29 ml/min, COPD (chronic obstructive pulmonary disease), DDD (degenerative disc disease), cervical and lumbar s/p surgery, GERD (gastroesophageal reflux disease), HTN (hypertension), Hyperlipidemia, SHAYLEE on CPAP, PSVT (paroxysmal supraventricular tachycardia), TIA (transient ischemic attack), and Type 2 diabetes mellitus. presents for had concerns including Weakness and Shortness of Breath (Pt presents c/o weakness with sob. Onset x2 days. PMH kidney dz with fistula placement for future dialysis/ has not started yet.  Recent covid +/ 3 weeks ago. ). on 9/13/2022.  Presented to office with worsening dyspnea, hypoxemia and "twitching"  Pt had labs done and had worsening GFR and severe hypocalcemia and hypomagnesemia  Electrolytes being repleted.  Pt still complaining of dyspnea, confusion, ++ intermittent twitching in upper ext  Still requiring 02 and has intermittent lethargy  No fever or chills  No cough or sputum  No chest pain or palpitation      We have been consulted for renal management        Medical History:   Past Medical History:   Diagnosis Date    Anemia     Anxiety     CAD (coronary artery disease)     CKD (chronic kidney disease) stage 4, GFR 15-29 ml/min     COPD (chronic obstructive pulmonary disease)     DDD (degenerative disc disease), cervical and lumbar s/p surgery     GERD (gastroesophageal reflux disease)     HTN (hypertension)     Hyperlipidemia     SHAYLEE on CPAP     PSVT (paroxysmal supraventricular tachycardia)     TIA (transient " ischemic attack)     Type 2 diabetes mellitus    CKD--> 5      Surgical History:   Past Surgical History:   Procedure Laterality Date    ANTERIOR CERVICAL DISCECTOMY W/ FUSION  11/26/2021    C5-6, C6-7 ACDF- Dr. Jett    AV FISTULA PLACEMENT      CARPAL TUNNEL RELEASE Bilateral     bilateral CTR & left UND- 2004 & 2020    COLONOSCOPY      CORONARY STENT PLACEMENT  2018    RCA    ELBOW SURGERY Bilateral     FUNCTIONAL ENDOSCOPIC SINUS SURGERY (FESS)      INGUINAL HERNIA REPAIR      MICRODISCECTOMY OF SPINE  04/17/2015    L4-5. L5-S1 decompression; left L4-5 microdiscectomy- Dr. Amaro    MYRINGOTOMY W/ TUBES      NASAL SEPTOPLASTY      SINUS ENDOSCOPY      TONSILLECTOMY, ADENOIDECTOMY      VASECTOMY         Family History:   Family History   Problem Relation Age of Onset    Multiple sclerosis Daughter    .     Social History:   Social History     Tobacco Use    Smoking status: Former     Packs/day: 2.00     Years: 37.00     Pack years: 74.00     Types: Cigarettes    Smokeless tobacco: Never    Tobacco comments:     Quit smoking cigarettes 6 years ago.  Vaped for 3 years.  Stopped completely 3 years ago   Substance Use Topics    Alcohol use: Never       Allergies:  Review of patient's allergies indicates:  No Known Allergies      Review of Systems:  Constitutional: ++generalized weakness, ++dyspnea, ++intermittent confusin  Skin: Denies wounds, rashes, no skin lesions or itching  EENT: Denies acute hearing/vision changes, tinnitus, or dysphagia  Respiratory:  Denies cough, ++worsening dyspnea, worsening hypoxemia   Cardiovascular: Denies chest pain, palpitations, or swelling  Gastrointestional: Denies abdominal pain, nausea, vomiting, diarrhea or constipation  Genitourinary: Denies dysuria, hematuria, or incontinence; able to empty bladder  Musculoskeletal: Denies myalgias, back pain, ++intermittent tremors  Neurological: Denies headaches, seizures, paresthesias, dizziness or weakness  Hematological: Denies unusual  bruising or bleeding  Psychiatric: Denies psychiatric concerns, hallucinations, intermittent confusion    Medications:    Current Facility-Administered Medications:     acetaminophen tablet 1,000 mg, 1,000 mg, Oral, Q6H PRN, Nola Escalante MD, 1,000 mg at 09/14/22 0349    acetaminophen tablet 650 mg, 650 mg, Oral, Q4H PRN, Nola Escalante MD    aluminum-magnesium hydroxide-simethicone 200-200-20 mg/5 mL suspension 30 mL, 30 mL, Oral, QID PRN, Nola Escalante MD    atorvastatin tablet 40 mg, 40 mg, Oral, QHS, Nola Escalante MD    calcitRIOL capsule 0.5 mcg, 0.5 mcg, Oral, Daily, Dottie Lilly MD    calcium carbonate 200 mg calcium (500 mg) chewable tablet 2,000 mg, 2,000 mg, Oral, Q8H, Nola Escalante MD, 2,000 mg at 09/14/22 0523    calcium gluconate 1 g in NS IVPB (premixed), 1 g, Intravenous, Once, Guido Lozada MD    cloNIDine tablet 0.2 mg, 0.2 mg, Oral, TID PRN, Nola Escalante MD    clopidogreL tablet 75 mg, 75 mg, Oral, Daily, Nola Escalante MD    dextrose 10% bolus 125 mL, 12.5 g, Intravenous, PRN, Nola Escalante MD    dextrose 10% bolus 250 mL, 25 g, Intravenous, PRN, Nola Escalante MD    enoxaparin injection 30 mg, 30 mg, Subcutaneous, Daily, Nola Escalante MD    EScitalopram oxalate tablet 10 mg, 10 mg, Oral, Daily, Nola Escalante MD    ferrous sulfate tablet 1 each, 1 tablet, Oral, Daily, Nola Escalante MD    furosemide injection 80 mg, 80 mg, Intravenous, Q8H, Guido Lozada MD    glucagon (human recombinant) injection 1 mg, 1 mg, Intramuscular, PRN, Nola Escalante MD    glucose chewable tablet 16 g, 16 g, Oral, PRN, Nola Escalante MD    glucose chewable tablet 24 g, 24 g, Oral, PRN, Nola Escalante MD    hydrALAZINE injection 10 mg, 10 mg, Intravenous, Q4H PRN, Nola Escalante MD    hydrALAZINE tablet 50 mg, 50 mg, Oral, TID, Nola Escalante MD    insulin aspart U-100 injection 1-10 Units, 1-10 Units, Subcutaneous, QID (AC + HS) PRN, Nola Escalante MD    isosorbide mononitrate 24 hr tablet 30 mg, 30 mg, Oral, Daily, Nola Escalante MD     labetaloL injection 10 mg, 10 mg, Intravenous, Q4H PRN, Nola Escalante MD    labetaloL tablet 200 mg, 200 mg, Oral, BID, Nola Escalante MD, 200 mg at 09/13/22 2326    magnesium oxide tablet 800 mg, 800 mg, Oral, TID, Guido Lozada MD, 800 mg at 09/13/22 2135    magnesium sulfate 2g in water 50mL IVPB (premix), 2 g, Intravenous, Once, Guido Lozada MD    melatonin tablet 6 mg, 6 mg, Oral, Nightly PRN, Nola Escalante MD    NIFEdipine 24 hr tablet 60 mg, 60 mg, Oral, BID, Nola Escalante MD    ondansetron injection 4 mg, 4 mg, Intravenous, Q4H PRN, Nola Escalante MD    pantoprazole EC tablet 40 mg, 40 mg, Oral, Daily, Nola Escalante MD, 40 mg at 09/14/22 0523    polyethylene glycol packet 17 g, 17 g, Oral, BID PRN, Nola Escalante MD    prochlorperazine injection Soln 5 mg, 5 mg, Intravenous, Q6H PRN, Nola Escalante MD    senna-docusate 8.6-50 mg per tablet 1 tablet, 1 tablet, Oral, BID PRN, Nola Escalante MD    simethicone chewable tablet 80 mg, 1 tablet, Oral, QID PRN, Nola Escalante MD    sodium bicarbonate tablet 1,300 mg, 1,300 mg, Oral, BID, Nola Escalante MD, 1,300 mg at 09/13/22 2326    sodium chloride 0.9% flush 10 mL, 10 mL, Intravenous, PRN, Nola Escalante MD    umeclidinium-vilanteroL 62.5-25 mcg/actuation DsDv 1 puff, 1 puff, Inhalation, Daily, Nola Escalante MD    vitamin renal formula (B-complex-vitamin c-folic acid) 1 mg per capsule 1 capsule, 1 capsule, Oral, Daily, Nola Escalante MD     Scheduled Meds:   atorvastatin  40 mg Oral QHS    calcitRIOL  0.5 mcg Oral Daily    calcium carbonate  2,000 mg Oral Q8H    calcium gluconate IVPB  1 g Intravenous Once    clopidogreL  75 mg Oral Daily    enoxaparin  30 mg Subcutaneous Daily    EScitalopram oxalate  10 mg Oral Daily    ferrous sulfate  1 tablet Oral Daily    furosemide (LASIX) injection  80 mg Intravenous Q8H    hydrALAZINE  50 mg Oral TID    isosorbide mononitrate  30 mg Oral Daily    labetaloL  200 mg Oral BID    magnesium oxide  800 mg Oral TID    magnesium sulfate IVPB  2 g  "Intravenous Once    NIFEdipine  60 mg Oral BID    pantoprazole  40 mg Oral Daily    sodium bicarbonate  1,300 mg Oral BID    umeclidinium-vilanteroL  1 puff Inhalation Daily    vitamin renal formula (B-complex-vitamin c-folic acid)  1 capsule Oral Daily     Continuous Infusions:      Objective:  BP (!) 158/84   Pulse 81   Temp 98.3 °F (36.8 °C) (Axillary)   Resp 18   Ht 5' 9" (1.753 m)   Wt 103 kg (227 lb)   SpO2 (!) 94%   BMI 33.52 kg/m²  Body mass index is 33.52 kg/m².    I/O last 3 completed shifts:  In: 340 [P.O.:240; I.V.:50; IV Piggyback:50]  Out: -   No intake/output data recorded.        Physical Exam:  General: more ill looking, but not acute distress, on Oxygen  Eyes: PERRLA, EOMI, conjunctiva clear, ++periorbital edema.,   HENT: atraumatic, oropharynx and nasal mucosa patent, no difficulty hearing  Neck: full ROM, no thyromegaly or lymphadenopathy  Respiratory: equal, rhonchi and end exp wheezing at bases  Cardiovascular: RRR without rub; BL radial and pedal pulses felt  Edema:tr  Gastrointestinal: soft, non-tender, non-distended; positive bowel sounds; no masses to palpation  Musculoskeletal:  ROM without limitation, int resting tremors  Integumentary: warm, dry; no rashes, wounds, or skin lesions  Neurological: int confusion, +resting tremors. Intermittent twitching, asterixis better than yesterday  Dialysis access:  L AV fistula    Labs:  Chemistries:   Recent Labs   Lab 09/13/22  1122 09/13/22  1446 09/14/22  0148    138 140   K 4.7 4.4 3.8   CO2 22 19* 22   BUN 42.9* 41.0* 43.6*   CREATININE 5.04* 4.57* 5.05*   CALCIUM 5.6* 5.3* 6.1*   BILITOT 0.7 0.6 0.7   ALKPHOS 66 67 73   ALT 17 17 17   AST 25 24 26   GLUCOSE 116* 143* 56*   MG  --  <0.60* 1.50*   PHOS 4.6  --  5.3*        CBC/Anemia Labs: Coags:    Recent Labs   Lab 09/13/22  1122 09/13/22  1446 09/14/22  0148   WBC 5.9 6.3 7.2   HGB 9.6* 9.1* 9.8*   HCT 30.6* 29.4* 30.7*    272 278   MCV 86.0 87.2 84.8   RDW 15.1 15.3 15.0 "    No results for input(s): PT, INR, APTT in the last 168 hours.     POCT Glucose: HbA1c:    Recent Labs   Lab 09/13/22 2052 09/14/22  0526 09/14/22  0527 09/14/22  0619   POCTGLUCOSE 158* 47* 49* 73    Hemoglobin A1C   Date Value Ref Range Status   06/09/2020 8.4 (H) <=6.5 % Final   09/21/2012 7.1 (H) 4.0 - 6.2 % Final     Hemoglobin A1c   Date Value Ref Range Status   08/11/2022 5.9 <=7.0 % Final   09/03/2021 9.1 (H) <<=7.0 % Final        No results for input(s): COLORU, CLARITYU, SPECGRAV, PHUR, PROTEINUA, GLUCOSEU, BILIRUBINCON, BLOODU, WBCU, RBCU, BACTERIA, MUCUS, NITRITE, LEUKOCYTESUR, UROBILINOGEN, HYALINECASTS in the last 24 hours.  CXR congetion    Impression:    Patient Active Problem List   Diagnosis    CKD (chronic kidney disease), stage V        Cervical spondylosis with myelopathy            Hypocalcemia    Hypomagnesemia     Uremic signs and symptoms  Volume overload  Severe hypomagnesemia and hypocalcemia      Plan:  Discussed with patient and wife in details  He is ESRD and dialysis will be beneficial to control uremia and volume status  Mag and Ca repletion in process  Will continue lasix  I asked Dr Barrow to see if possible to use AVF , if not, will benefit from tunneled cath  Will monitor close       Guido Lozada      Thank you for this consult.

## 2022-09-14 NOTE — CONSULTS
Vascular Surgery Consult      09/14/2022   Location:[facility]   Inpatient consult to Vascular Surgery  Consult performed by: Himanshu Barrow MD  Consult ordered by: Guido Lozada MD      Chief complaint:  Assessment for dialysis access     HPI:  The patient is a 56-year-old gentleman well known to me with a history of renal insufficiency.  We placed a left brachiocephalic fistula 3 weeks ago.  Unfortunately, the patient's kidney function has deteriorated and now needs dialysis.    HISTORY REVIEW  the patient    Past Medical and Surgical History  Allergies :   Patient has no known allergies.    Prior to Admission medications    Medication Sig Start Date End Date Taking? Authorizing Provider   albuterol (PROVENTIL/VENTOLIN HFA) 90 mcg/actuation inhaler Inhale 2 puffs into the lungs every 4 (four) hours as needed. 8/24/22  Yes Historical Provider   ANORO ELLIPTA 62.5-25 mcg/actuation DsDv as needed. 4/27/22  Yes Historical Provider   ascorbic acid, vitamin C, (VITAMIN C) 500 MG tablet Take 500 mg by mouth once daily.   Yes Historical Provider   BASAGLAR KWIKPEN U-100 INSULIN glargine 100 units/mL (3mL) SubQ pen Inject 25 Units into the skin once daily. 5/9/22  Yes Historical Provider   cholecalciferol, vitamin D3, 1,250 mcg (50,000 unit) capsule Take 1,250 mcg by mouth once a week. 9/8/21  Yes Historical Provider   cholecalciferol, vitamin D3, 125 mcg (5,000 unit) Tab Take 5,000 Units by mouth once daily.   Yes Historical Provider   clopidogreL (PLAVIX) 75 mg tablet Take 75 mg by mouth once daily at 6am. 1/25/22  Yes Historical Provider   EScitalopram oxalate (LEXAPRO) 10 MG tablet Take 10 mg by mouth once daily at 6am. 4/8/22  Yes Historical Provider   FARXIGA 10 mg tablet Take 10 mg by mouth once daily at 6am. 4/11/22  Yes Historical Provider   fenofibrate (TRICOR) 145 MG tablet Take 145 mg by mouth once daily at 6am. 3/16/22  Yes Historical Provider   glimepiride (AMARYL) 4  MG tablet Take 2 mg by mouth 2 (two) times a day. 4/18/22  Yes Historical Provider   hydrALAZINE (APRESOLINE) 100 MG tablet Take 1 tablet (100 mg total) by mouth 3 (three) times daily.  Patient taking differently: Take 50 mg by mouth 3 (three) times daily. 8/17/22 8/17/23 Yes Gage Slater MD   isosorbide mononitrate (IMDUR) 30 MG 24 hr tablet TAKE 1 TABLET BY MOUTH ONCE DAILY 7/19/22  Yes FARHAD Blanca   labetaloL (NORMODYNE) 200 MG tablet Take 200 mg by mouth 2 (two) times a day. 4/14/22  Yes Historical Provider   multivit-mins/iron/folic/lycop (CENTRUM MEN ORAL) Take 1 tablet by mouth once daily at 6am.   Yes Historical Provider   NIFEdipine (ADALAT CC) 60 MG TbSR Take by mouth 2 (two) times a day. 5/19/22  Yes Historical Provider   nitroGLYCERIN (NITROSTAT) 0.4 MG SL tablet as needed. 5/17/22  Yes Historical Provider   pantoprazole (PROTONIX) 40 MG tablet Take 40 mg by mouth once daily at 6am. 4/13/22  Yes Historical Provider   rosuvastatin (CRESTOR) 20 MG tablet Take 20 mg by mouth once daily. 5/12/22  Yes Historical Provider   sodium bicarbonate 650 MG tablet Take 2 tablets (1,300 mg total) by mouth 2 (two) times daily. 7/20/22 7/20/23 Yes Guido Lozada MD   terazosin (HYTRIN) 2 MG capsule Take 2 mg by mouth 2 (two) times a day. 4/19/21  Yes Historical Provider   torsemide (DEMADEX) 20 MG Tab Take 2 tablets (40 mg total) by mouth 2 (two) times a day.  Patient taking differently: Take 40 mg by mouth once daily. 8/11/22 10/10/22 Yes Guido Lozada MD   TRULICITY 1.5 mg/0.5 mL pen injector once a week. Q wednesday 2/1/22  Yes Historical Provider   VASCEPA 1 gram Cap Take 2 tablets by mouth 2 (two) times a day. 2/1/22  Yes Historical Provider   XYOSTED 100 mg/0.5 mL AtIn Inject into the skin once a week. Q wednesday 5/17/22  Yes Historical Provider   cyclobenzaprine (FLEXERIL) 10 MG tablet Take by mouth. 8/9/22   Historical Provider   pulse oximeter (PULSE OXIMETER) device Use twice daily at 8 AM and 3 PM  "and record the value in MyChart as directed.    **Please Mail to patient** 8/17/22   Emily Woods PA-C      Medical :   He has a past medical history of Anemia, Anxiety, CAD (coronary artery disease), CKD (chronic kidney disease) stage 4, GFR 15-29 ml/min, COPD (chronic obstructive pulmonary disease), DDD (degenerative disc disease), cervical and lumbar s/p surgery, GERD (gastroesophageal reflux disease), HTN (hypertension), Hyperlipidemia, SHAYLEE on CPAP, PSVT (paroxysmal supraventricular tachycardia), TIA (transient ischemic attack), and Type 2 diabetes mellitus.    Surgical :   He has a past surgical history that includes Anterior cervical discectomy w/ fusion (11/26/2021); Coronary stent placement (2018); Microdiscectomy of spine (04/17/2015); Carpal tunnel release (Bilateral); TONSILLECTOMY, ADENOIDECTOMY; Sinus endoscopy; Nasal septoplasty; Elbow surgery (Bilateral); Colonoscopy; Myringotomy w/ tubes; Functional endoscopic sinus surgery (FESS); Inguinal hernia repair; Vasectomy; and AV fistula placement.     Family History  His family history includes Multiple sclerosis in his daughter.    Social History  He reports that he has quit smoking. His smoking use included cigarettes. He has a 74.00 pack-year smoking history. He has never used smokeless tobacco. He reports that he does not drink alcohol and does not use drugs.     Review of Systems     Objective   Physical Exam  VITAL SIGNS: 24 HR MIN & MAX LAST    Temp  Min: 97.4 °F (36.3 °C)  Max: 98.3 °F (36.8 °C)  97.6 °F (36.4 °C)        BP  Min: 133/68  Max: 163/73  (!) 141/62     Pulse  Min: 72  Max: 92  72     Resp  Min: 18  Max: 20  20    SpO2  Min: 90 %  Max: 96 %  95 %      HT: 5' 9" (175.3 cm)  WT: 99.3 kg (218 lb 14.7 oz)  BMI: 32.3     Current Facility-Administered Medications:     acetaminophen tablet 1,000 mg, 1,000 mg, Oral, Q6H PRN, Nola Escalante MD, 1,000 mg at 09/14/22 1043    acetaminophen tablet 650 mg, 650 mg, Oral, Q4H PRN, Nola Escalante MD    " aluminum-magnesium hydroxide-simethicone 200-200-20 mg/5 mL suspension 30 mL, 30 mL, Oral, QID PRN, Nola Escalante MD    atorvastatin tablet 40 mg, 40 mg, Oral, QHS, Nola Escalante MD    calcitRIOL capsule 0.5 mcg, 0.5 mcg, Oral, Daily, Dottie Lilly MD, 0.5 mcg at 09/14/22 0938    calcium carbonate 200 mg calcium (500 mg) chewable tablet 2,000 mg, 2,000 mg, Oral, Q8H, Nola Escalante MD, 2,000 mg at 09/14/22 1411    cloNIDine tablet 0.2 mg, 0.2 mg, Oral, TID PRN, Nola Escalante MD    clopidogreL tablet 75 mg, 75 mg, Oral, Daily, Nola Escalante MD, 75 mg at 09/14/22 0938    dextrose 10% bolus 125 mL, 12.5 g, Intravenous, PRN, Nola Escalante MD    dextrose 10% bolus 250 mL, 25 g, Intravenous, PRN, Nola Escalante MD    enoxaparin injection 30 mg, 30 mg, Subcutaneous, Daily, Nola Escalante MD    EScitalopram oxalate tablet 10 mg, 10 mg, Oral, Daily, Nola Escalante MD, 10 mg at 09/14/22 0938    ferrous sulfate tablet 1 each, 1 tablet, Oral, Daily, Nola Escalante MD, 1 each at 09/14/22 0938    furosemide injection 80 mg, 80 mg, Intravenous, Q8H, Guido Lozada MD, 80 mg at 09/14/22 1411    glucagon (human recombinant) injection 1 mg, 1 mg, Intramuscular, PRN, Nola Escalante MD    glucose chewable tablet 16 g, 16 g, Oral, PRN, Nola Escalante MD    glucose chewable tablet 24 g, 24 g, Oral, PRN, Nola Escalante MD    hydrALAZINE injection 10 mg, 10 mg, Intravenous, Q4H PRN, Nola Escalante MD    hydrALAZINE tablet 50 mg, 50 mg, Oral, TID, Nola Escalante MD, 50 mg at 09/14/22 1411    insulin aspart U-100 injection 1-10 Units, 1-10 Units, Subcutaneous, QID (AC + HS) PRN, Nola Escalante MD    isosorbide mononitrate 24 hr tablet 30 mg, 30 mg, Oral, Daily, Nola Escalante MD, 30 mg at 09/14/22 0938    labetaloL injection 10 mg, 10 mg, Intravenous, Q4H PRN, Nola Escalante MD    labetaloL tablet 200 mg, 200 mg, Oral, BID, Nola Escalante MD, 200 mg at 09/14/22 0939    magnesium oxide tablet 800 mg, 800 mg, Oral, TID, Guido Lozada MD, 800 mg at 09/14/22 1411     melatonin tablet 6 mg, 6 mg, Oral, Nightly PRN, Nola Escalante MD    NIFEdipine 24 hr tablet 60 mg, 60 mg, Oral, BID, Nola Escalante MD, 60 mg at 09/14/22 0938    ondansetron injection 4 mg, 4 mg, Intravenous, Q4H PRN, Nola Escalante MD    pantoprazole EC tablet 40 mg, 40 mg, Oral, Daily, Nola Escalante MD, 40 mg at 09/14/22 0523    polyethylene glycol packet 17 g, 17 g, Oral, BID PRN, Nola Escalante MD    prochlorperazine injection Soln 5 mg, 5 mg, Intravenous, Q6H PRN, Nola Escalante MD    senna-docusate 8.6-50 mg per tablet 1 tablet, 1 tablet, Oral, BID PRN, Nola Escalante MD    simethicone chewable tablet 80 mg, 1 tablet, Oral, QID PRN, Nola Escalante MD    sodium bicarbonate tablet 1,300 mg, 1,300 mg, Oral, BID, Nola Escalante MD, 1,300 mg at 09/14/22 0938    sodium chloride 0.9% bolus 200 mL, 200 mL, Intravenous, PRN, Guido Saldana MD    sodium chloride 0.9% flush 10 mL, 10 mL, Intravenous, PRN, Nola Escalante MD    umeclidinium-vilanteroL 62.5-25 mcg/actuation DsDv 1 puff, 1 puff, Inhalation, Daily, Nola Escalante MD, 1 puff at 09/14/22 0937    vitamin renal formula (B-complex-vitamin c-folic acid) 1 mg per capsule 1 capsule, 1 capsule, Oral, Daily, Nola Escalante MD, 1 capsule at 09/14/22 0938  X-Ray Chest 1 View   Final Result      Enlarged cardiac silhouette with pulmonary interstitial edema         Electronically signed by: Ne Amaya   Date:    09/13/2022   Time:    17:26              Assessment & Plan   Active Hospital Problems    Diagnosis  POA    *CKD (chronic kidney disease), stage V [N18.5]  Yes    Hypocalcemia [E83.51]  Yes    Hypomagnesemia [E83.42]  Yes      Resolved Hospital Problems   No resolved problems to display.     Nicholas Vera is a 56 y.o. male with a left brachiocephalic fistula.  The fistula is maturing nicely.  I think that we can start using it for access with a small needle.  I discussed the case with Dr. Saldana and with the dialysis nurses.      Himanshu Barrow

## 2022-09-14 NOTE — H&P
[facility] Hospital Medicine   History & Physical Note      Patient Name: Nicholas Vera  : 1965  MRN: [id number]  PCP: Saqib Emery Jr, MD  Admitting Service: Hospital Medicine  Attending Physician: Nola Escalante MD  Admission Date: 2022 - IP- Inpatient   Length of Stay: 0  History source: EMR, patient and/or patient's family  Face-to-Face encounter date: 2022  Code status: Full    Chief Complaint   Abnormal labs    History of Present Illness   This is a 56-year-old male with medical history notable for CKD stage 5 sent to the ED by his nephrologist Dr. Lozada due to abnormal labs.  Report he has been feeling weak since his hospitalization last month with COVID-19 and hypertensive emergency, but over the past week he had been feeling very weak and dyspneic on exertion, had a blood workup done today that revealed severe hypocalcemia and hypomagnesemia.  On arrival to ED he was hypertensive and hypoxic and chest x-ray show pulmonary interstitial edema. He was evaluated by Dr. Lozada and was started on Lasix 80 mg IV twice daily and magnesium and calcium replacement and referred to hospital medicine service for further evaluation and management.      ROS   Except as documented, all other systems reviewed and negative     Past Medical History   CKD stage 4-5, Diabetic nephropathy and HTN nephrosclerosis - AVF creation 2022  T2DM  HTN  HLD  Paroxysmal SVT (implantable loop recorder removed in 2018)  Anemia of chronic disease - on Epogen for Hb < 11  COPD  SHAYLEE on CPAP  CAD/stent   Echo 2022  LVEF 55-60%, small pericardial effusion  LHC: 2020 patent RCA stent,  mLAD 50%, mLCx 60, no intervention, Dr. Fuentes  LHC: 2018 PCI/stent to RCA Dr. Murry  TIA  DDD/cervical and lumbar radiculopathy s/p surgery  GERD  Anxiety  HX Post-polypectomy GI bleed      Past Surgical History   Left arm AV fistula creation 2022  Anterior cervical discectomy  and fusion  Carpal tunnel release  LHC/PCI/stent  Implantable loop recorder placement and removal 7/10/2018  Bilateral elbow surgery  Lumbar laminectomy and microdiscectomy  Myringotomy, sinus surgery and nasal septoplasty  Tonsillectomy and adenoidectomy  Vasectomy  Colonoscopy - 3/4/2022  Peripheral angiogram 1/7/2021 mild calcific disease bilateral lower extremities without evidence of focal stenosis    Social History     Social History     Tobacco Use    Smoking status: Former     Packs/day: 2.00     Years: 37.00     Pack years: 74.00     Types: Cigarettes    Smokeless tobacco: Never    Tobacco comments:     Quit smoking cigarettes 6 years ago.  Vaped for 3 years.  Stopped completely 3 years ago   Substance Use Topics    Alcohol use: Never        Family History   Reviewed and negative    Allergies   Patient has no known allergies.    Home Medications     Prior to Admission medications    Medication Sig Start Date End Date Taking? Authorizing Provider   albuterol (PROVENTIL/VENTOLIN HFA) 90 mcg/actuation inhaler Inhale 2 puffs into the lungs every 4 (four) hours as needed. 8/24/22  Yes Historical Provider   ANORO ELLIPTA 62.5-25 mcg/actuation DsDv as needed. 4/27/22  Yes Historical Provider   ascorbic acid, vitamin C, (VITAMIN C) 500 MG tablet Take 500 mg by mouth once daily.   Yes Historical Provider   BASAGLAR KWIKPEN U-100 INSULIN glargine 100 units/mL (3mL) SubQ pen Inject 25 Units into the skin once daily. 5/9/22  Yes Historical Provider   cholecalciferol, vitamin D3, 1,250 mcg (50,000 unit) capsule Take 1,250 mcg by mouth once a week. 9/8/21  Yes Historical Provider   cholecalciferol, vitamin D3, 125 mcg (5,000 unit) Tab Take 5,000 Units by mouth once daily.   Yes Historical Provider   clopidogreL (PLAVIX) 75 mg tablet Take 75 mg by mouth once daily at 6am. 1/25/22  Yes Historical Provider   EScitalopram oxalate (LEXAPRO) 10 MG tablet Take 10 mg by mouth once daily at 6am. 4/8/22  Yes Historical Provider    FARXIGA 10 mg tablet Take 10 mg by mouth once daily at 6am. 4/11/22  Yes Historical Provider   fenofibrate (TRICOR) 145 MG tablet Take 145 mg by mouth once daily at 6am. 3/16/22  Yes Historical Provider   glimepiride (AMARYL) 4 MG tablet Take 2 mg by mouth 2 (two) times a day. 4/18/22  Yes Historical Provider   hydrALAZINE (APRESOLINE) 100 MG tablet Take 1 tablet (100 mg total) by mouth 3 (three) times daily.  Patient taking differently: Take 50 mg by mouth 3 (three) times daily. 8/17/22 8/17/23 Yes Gage Slater MD   isosorbide mononitrate (IMDUR) 30 MG 24 hr tablet TAKE 1 TABLET BY MOUTH ONCE DAILY 7/19/22  Yes FARHAD Blanca   labetaloL (NORMODYNE) 200 MG tablet Take 200 mg by mouth 2 (two) times a day. 4/14/22  Yes Historical Provider   multivit-mins/iron/folic/lycop (CENTRUM MEN ORAL) Take 1 tablet by mouth once daily at 6am.   Yes Historical Provider   NIFEdipine (ADALAT CC) 60 MG TbSR Take by mouth 2 (two) times a day. 5/19/22  Yes Historical Provider   nitroGLYCERIN (NITROSTAT) 0.4 MG SL tablet as needed. 5/17/22  Yes Historical Provider   pantoprazole (PROTONIX) 40 MG tablet Take 40 mg by mouth once daily at 6am. 4/13/22  Yes Historical Provider   rosuvastatin (CRESTOR) 20 MG tablet Take 20 mg by mouth once daily. 5/12/22  Yes Historical Provider   sodium bicarbonate 650 MG tablet Take 2 tablets (1,300 mg total) by mouth 2 (two) times daily. 7/20/22 7/20/23 Yes Guido Lozada MD   terazosin (HYTRIN) 2 MG capsule Take 2 mg by mouth 2 (two) times a day. 4/19/21  Yes Historical Provider   torsemide (DEMADEX) 20 MG Tab Take 2 tablets (40 mg total) by mouth 2 (two) times a day.  Patient taking differently: Take 40 mg by mouth once daily. 8/11/22 10/10/22 Yes Guido Lozada MD   TRULICITY 1.5 mg/0.5 mL pen injector once a week. Q wednesday 2/1/22  Yes Historical Provider   VASCEPA 1 gram Cap Take 2 tablets by mouth 2 (two) times a day. 2/1/22  Yes Historical Provider   XYOSTED 100 mg/0.5 mL AtIn Inject  into the skin once a week. Q wednesday 5/17/22  Yes Historical Provider   cyclobenzaprine (FLEXERIL) 10 MG tablet Take by mouth. 8/9/22   Historical Provider   pulse oximeter (PULSE OXIMETER) device Use twice daily at 8 AM and 3 PM and record the value in MyChart as directed.    **Please Mail to patient** 8/17/22   Emily Woods PA-C        Inpatient Medications   Scheduled Meds   atorvastatin  40 mg Oral QHS    calcitRIOL  0.5 mcg Oral Daily    calcium carbonate  2,000 mg Oral Q8H    clopidogreL  75 mg Oral Daily    enoxaparin  30 mg Subcutaneous Daily    EScitalopram oxalate  10 mg Oral Daily    ferrous sulfate  1 tablet Oral Daily    furosemide (LASIX) injection  80 mg Intravenous Q12H    hydrALAZINE  50 mg Oral TID    isosorbide mononitrate  30 mg Oral Daily    labetaloL  200 mg Oral BID    magnesium oxide  800 mg Oral TID    NIFEdipine  60 mg Oral BID    pantoprazole  40 mg Oral Daily    sodium bicarbonate  1,300 mg Oral BID    umeclidinium-vilanteroL  1 puff Inhalation Daily    vitamin renal formula (B-complex-vitamin c-folic acid)  1 capsule Oral Daily     Continuous Infusions    PRN Meds  acetaminophen, acetaminophen, aluminum-magnesium hydroxide-simethicone, cloNIDine, dextrose 10%, dextrose 10%, glucagon (human recombinant), glucose, glucose, hydrALAZINE, insulin aspart U-100, labetalol, melatonin, ondansetron, polyethylene glycol, prochlorperazine, senna-docusate 8.6-50 mg, simethicone, sodium chloride 0.9%    Physical Exam   Vital Signs  Temp:  [97.9 °F (36.6 °C)-98.6 °F (37 °C)]   Pulse:  [75-91]   Resp:  [18-20]   BP: (119-154)/(63-75)   SpO2:  [90 %-95 %]    General: Appears comfortable  HEENT: NC/AT  Neck:  No JVD  Chest:  Diminished breath sound at the bases  CVS: Regular rhythm. Normal S1/S2.  Trace pedal edema  Abdomen: nondistended, normoactive BS, soft and non-tender.  MSK: No obvious deformity or joint swelling, left arm AV fistula with good thrill and bruit  Skin: Warm and dry  Neuro:  AAOx3, no focal neurological deficit  Psych: Cooperative    Labs     Recent Labs     09/13/22  1122 09/13/22  1446   WBC 5.9 6.3   RBC 3.56* 3.37*   HGB 9.6* 9.1*   HCT 30.6* 29.4*   MCV 86.0 87.2   MCH 27.0 27.0   MCHC 31.4* 31.0*   RDW 15.1 15.3    272        Recent Labs     09/13/22  1122 09/13/22  1446    138   K 4.7 4.4   CHLORIDE 103 106   CO2 22 19*   BUN 42.9* 41.0*   CREATININE 5.04* 4.57*   GLUCOSE 116* 143*   CALCIUM 5.6* 5.3*   MG  --  <0.60*   PHOS 4.6  --    ALBUMIN 3.6 3.3*   GLOBULIN 3.2 3.9*   ALKPHOS 66 67   ALT 17 17   AST 25 24   BILITOT 0.7 0.6     Recent Labs     09/13/22  1446   .6*   TROPONINI 0.039            Imaging     X-Ray Chest 1 View   Final Result      Enlarged cardiac silhouette with pulmonary interstitial edema         Electronically signed by: Ne Amaya   Date:    09/13/2022   Time:    17:26        Assessment & Plan   CKD stage 5  Acute hypoxemic respiratory failure-pulmonary edema  Renal related volume overload  Hypocalcemia  Hypomagnesemia  Hypertension, uncontrolled    HX Anemia of CKD, T2DM, HTN, HLD, CAD/stent, PSVT, Anxiety, SHAYLEE/CPAP, COPD    Plan:    Additional magnesium sulfate 4 g IV x1  Continue magnesium oxide 800 mg Q8H  Increase calcium carbonate to 2 g Q8H  Continue calcitriol 0.5 mcg daily  Continue diuresis per Nephrology Lasix 80 mg IV Q8H  No indication for emergent dialysis at this time  Continue supplemental O2 by nasal cannula and resume CPAP at bedtime  Remaining home medication reviewed and resumed  SSI#ACHS, hold oral diabetic agents and long-acting insulin  VTE Prophylaxis:  Enoxaparin 30 mg SC daily    Critical care time:  35 minutes  Critical care diagnosis:  Profound hypocalcemia and hypomagnesemia    Nola Escalante MD  Internal Medicine

## 2022-09-14 NOTE — PLAN OF CARE
Problem: Adult Inpatient Plan of Care  Goal: Plan of Care Review  9/13/2022 2227 by Melba Sr RN  Outcome: Ongoing, Progressing  9/13/2022 2213 by Melba Sr RN  Outcome: Ongoing, Progressing  Goal: Optimal Comfort and Wellbeing  9/13/2022 2227 by Melba Sr RN  Outcome: Ongoing, Progressing  9/13/2022 2213 by Melba Sr RN  Outcome: Ongoing, Progressing  Goal: Readiness for Transition of Care  9/13/2022 2227 by Melba Sr RN  Outcome: Ongoing, Progressing  9/13/2022 2213 by Melba Sr RN  Outcome: Ongoing, Progressing

## 2022-09-14 NOTE — PROGRESS NOTES
[facility]  Hospital Medicine Progress Note        Chief Complaint   Abnormal labs     History of Present Illness   This is a 56-year-old male with medical history notable for CKD stage 5 sent to the ED by his nephrologist Dr. Lozada due to abnormal labs.  Report he has been feeling weak since his hospitalization last month with COVID-19 and hypertensive emergency, but over the past week he had been feeling very weak and dyspneic on exertion, had a blood workup done today that revealed severe hypocalcemia and hypomagnesemia.  On arrival to ED he was hypertensive and hypoxic and chest x-ray show pulmonary interstitial edema. He was evaluated by Dr. Lozada and was started on Lasix 80 mg IV twice daily and magnesium and calcium replacement and referred to hospital medicine service for further evaluation and management.       Interval Hx:   Pt seen and examined,Pt had labs done and had worsening GFR and severe hypocalcemia and hypomagnesemia  Electrolytes being repleted.  Pt still complaining of dyspnea, confusion, ++ intermittent twitching in upper ext  Still requiring 02 and has intermittent lethargy       Objective/physical exam:  General: In no acute distress, afebrile  Chest: Clear to auscultation bilaterally  Heart: RRR, +S1, S2, no appreciable murmur  Abdomen: Soft, nontender, BS +  MSK: Warm, no lower extremity edema, no clubbing or cyanosis  Neurologic: Alert and oriented x4, Cranial nerve II-XII intact, Strength 5/5 in all 4 extremities    VITAL SIGNS: 24 HRS MIN & MAX LAST   Temp  Min: 97.4 °F (36.3 °C)  Max: 98.6 °F (37 °C) 97.4 °F (36.3 °C)   BP  Min: 119/65  Max: 158/84 (!) 152/72     Pulse  Min: 75  Max: 92  78   Resp  Min: 18  Max: 20 18   SpO2  Min: 90 %  Max: 95 % 95 %       Recent Labs   Lab 09/13/22  1122 09/13/22  1446 09/14/22  0148   WBC 5.9 6.3 7.2   RBC 3.56* 3.37* 3.62*   HGB 9.6* 9.1* 9.8*   HCT 30.6* 29.4* 30.7*   MCV 86.0 87.2 84.8   MCH 27.0 27.0 27.1   MCHC  31.4* 31.0* 31.9*   RDW 15.1 15.3 15.0    272 278   MPV 11.0* 10.6* 10.1       Recent Labs   Lab 09/13/22  1122 09/13/22  1446 09/14/22  0148    138 140   K 4.7 4.4 3.8   CO2 22 19* 22   BUN 42.9* 41.0* 43.6*   CREATININE 5.04* 4.57* 5.05*   CALCIUM 5.6* 5.3* 6.1*   MG  --  <0.60* 1.50*   ALBUMIN 3.6 3.3* 3.6   ALKPHOS 66 67 73   ALT 17 17 17   AST 25 24 26   BILITOT 0.7 0.6 0.7          Microbiology Results (last 7 days)       ** No results found for the last 168 hours. **             See below for Radiology    Scheduled Med:   atorvastatin  40 mg Oral QHS    calcitRIOL  0.5 mcg Oral Daily    calcium carbonate  2,000 mg Oral Q8H    clopidogreL  75 mg Oral Daily    enoxaparin  30 mg Subcutaneous Daily    EScitalopram oxalate  10 mg Oral Daily    ferrous sulfate  1 tablet Oral Daily    furosemide (LASIX) injection  80 mg Intravenous Q8H    hydrALAZINE  50 mg Oral TID    isosorbide mononitrate  30 mg Oral Daily    labetaloL  200 mg Oral BID    magnesium oxide  800 mg Oral TID    magnesium sulfate IVPB  2 g Intravenous Once    NIFEdipine  60 mg Oral BID    pantoprazole  40 mg Oral Daily    sodium bicarbonate  1,300 mg Oral BID    umeclidinium-vilanteroL  1 puff Inhalation Daily    vitamin renal formula (B-complex-vitamin c-folic acid)  1 capsule Oral Daily        Continuous Infusions:       PRN Meds:  acetaminophen, acetaminophen, aluminum-magnesium hydroxide-simethicone, cloNIDine, dextrose 10%, dextrose 10%, glucagon (human recombinant), glucose, glucose, hydrALAZINE, insulin aspart U-100, labetalol, melatonin, ondansetron, polyethylene glycol, prochlorperazine, senna-docusate 8.6-50 mg, simethicone, sodium chloride 0.9%       Assessment/Plan:  CKD stage 5  Acute hypoxemic respiratory failure-pulmonary edema  Renal related volume overload  Hypocalcemia  Hypomagnesemia  Hypertension, uncontrolled     HX Anemia of CKD, T2DM, HTN, HLD, CAD/stent, PSVT, Anxiety, SHAYLEE/CPAP, COPD     Plan:     Continue  magnesium oxide 800 mg Q8H  Increase calcium carbonate to 2 g Q8H  Continue calcitriol 0.5 mcg daily  Continue diuresis per Nephrology , HD soon   Continue supplemental O2 by nasal cannula and resume CPAP at bedtime  Remaining home medication reviewed and resumed  SSI#ACHS, hold oral diabetic agents and long-acting insulin  VTE Prophylaxis:  Enoxaparin 30 mg SC daily    VTE prophylaxis:   Patient condition:  Stable/Fair/Guarded/ Serious/ Critical    Anticipated discharge and Disposition:         All diagnosis and differential diagnosis have been reviewed; assessment and plan has been documented; I have personally reviewed the labs and test results that are presently available; I have reviewed the patients medication list; I have reviewed the consulting providers response and recommendations. I have reviewed or attempted to review medical records based upon their availability    All of the patient's questions have been  addressed and answered. Patient's is agreeable to the above stated plan. I will continue to monitor closely and make adjustments to medical management as needed.  _____________________________________________________________________    Nutrition Status:    Radiology:  X-Ray Chest 1 View  Narrative: EXAMINATION:  XR CHEST 1 VIEW    CLINICAL HISTORY:  chest pain;    TECHNIQUE:  Single frontal view of the chest was performed.    COMPARISON:  08/15/2022    FINDINGS:  LINES AND TUBES: EKG/telemetry leads overlie the chest.    MEDIASTINUM AND WILLIAMS: Cardiac silhouette is enlarged.    LUNGS: Vascular congestion with interstitial edema.    PLEURA:No pleural effusion. No pneumothorax.    BONES: Postop ACDF.  Impression: Enlarged cardiac silhouette with pulmonary interstitial edema    Electronically signed by: Ne Amaya  Date:    09/13/2022  Time:    17:26  CT Chest Without Contrast  Narrative: EXAMINATION:  CT CHEST WITHOUT CONTRAST    CLINICAL HISTORY:  Lung nodule, 6-8mm;, Solitary pulmonary  nodule.    TECHNIQUE:  PATIENT RADIATION DOSE: DLP(mGycm) 532.80    As per PQRS measures, all CT scans at this facility used dose modulation, iterative reconstruction, and/or weight based dose adjustment when appropriate to reduce radiation dose to as low as reasonably achievable.    COMPARISON:  01/20/2022    FINDINGS:  Serial axial imaging of the chest without the administration of IV contrast.  Both soft tissue and pulmonary parenchymal windows were obtained.  Additional sagittal and coronal reconstructions were performed.Degenerative changes are noted to the thoracolumbar spine.  There is mild heterogeneity of the thyroid lobes.  Atherosclerosis is seen within the aorta and branching vessels.  A few ill-defined prominent lymph nodes are again seen within the mediastinum.  The heart is mildly enlarged.  Ill-defined fatty lymph nodes are seen within the axilla bilaterally.  The airways are grossly patent.  There is slight increase in size of the small bilateral posterior pleural effusions.  There is increasing prominence of the pulmonary vasculature with mild hazy interstitial opacities scattered throughout the mid and lower lungs.  Impression: 1. Interim increasing small bilateral pleural effusion with associated minimal infiltrate and or atelectasis at the posterior lower lungs  2. Interim increasing prominence of pulmonary vasculature with increasing hazy ground-glass interstitial opacities at the mid and lower lungs bilaterally suggesting pulmonary vasculature congestion and early interstitial pulmonary edema.  Clinical correlation is indicated  3. Mild cardiomegaly  4. Ill-defined mildly prominent lymph nodes again identified within the mediastinum  5. Atherosclerosis    Electronically signed by: Josiah Lucio  Date:    09/13/2022  Time:    11:03      Naun Chaudhary MD   09/14/2022

## 2022-09-15 LAB
ALBUMIN SERPL-MCNC: 3.5 GM/DL (ref 3.5–5)
ALBUMIN/GLOB SERPL: 0.8 RATIO (ref 1.1–2)
ALP SERPL-CCNC: 77 UNIT/L (ref 40–150)
ALT SERPL-CCNC: 15 UNIT/L (ref 0–55)
AST SERPL-CCNC: 22 UNIT/L (ref 5–34)
BASOPHILS # BLD AUTO: 0.06 X10(3)/MCL (ref 0–0.2)
BASOPHILS NFR BLD AUTO: 1 %
BILIRUBIN DIRECT+TOT PNL SERPL-MCNC: 0.7 MG/DL
BUN SERPL-MCNC: 36.2 MG/DL (ref 8.4–25.7)
CALCIUM SERPL-MCNC: 8 MG/DL (ref 8.4–10.2)
CHLORIDE SERPL-SCNC: 97 MMOL/L (ref 98–107)
CO2 SERPL-SCNC: 26 MMOL/L (ref 22–29)
CREAT SERPL-MCNC: 3.87 MG/DL (ref 0.73–1.18)
EOSINOPHIL # BLD AUTO: 0.28 X10(3)/MCL (ref 0–0.9)
EOSINOPHIL NFR BLD AUTO: 4.6 %
ERYTHROCYTE [DISTWIDTH] IN BLOOD BY AUTOMATED COUNT: 14.8 % (ref 11.5–17)
GFR SERPLBLD CREATININE-BSD FMLA CKD-EPI: 17 MLS/MIN/1.73/M2
GLOBULIN SER-MCNC: 4.5 GM/DL (ref 2.4–3.5)
GLUCOSE SERPL-MCNC: 76 MG/DL (ref 74–100)
HBV CORE AB SERPL QL IA: NONREACTIVE
HBV SURFACE AG SERPL QL IA: NONREACTIVE
HCT VFR BLD AUTO: 34.7 % (ref 42–52)
HGB BLD-MCNC: 10.9 GM/DL (ref 14–18)
IMM GRANULOCYTES # BLD AUTO: 0.01 X10(3)/MCL (ref 0–0.04)
IMM GRANULOCYTES NFR BLD AUTO: 0.2 %
LYMPHOCYTES # BLD AUTO: 0.59 X10(3)/MCL (ref 0.6–4.6)
LYMPHOCYTES NFR BLD AUTO: 9.6 %
MAGNESIUM SERPL-MCNC: 2.2 MG/DL (ref 1.6–2.6)
MCH RBC QN AUTO: 26.5 PG (ref 27–31)
MCHC RBC AUTO-ENTMCNC: 31.4 MG/DL (ref 33–36)
MCV RBC AUTO: 84.2 FL (ref 80–94)
MONOCYTES # BLD AUTO: 0.61 X10(3)/MCL (ref 0.1–1.3)
MONOCYTES NFR BLD AUTO: 9.9 %
NEUTROPHILS # BLD AUTO: 4.6 X10(3)/MCL (ref 2.1–9.2)
NEUTROPHILS NFR BLD AUTO: 74.7 %
NRBC BLD AUTO-RTO: 0 %
PHOSPHATE SERPL-MCNC: 5.7 MG/DL (ref 2.3–4.7)
PLATELET # BLD AUTO: 330 X10(3)/MCL (ref 130–400)
PMV BLD AUTO: 10.3 FL (ref 7.4–10.4)
POCT GLUCOSE: 227 MG/DL (ref 70–110)
POCT GLUCOSE: 90 MG/DL (ref 70–110)
POTASSIUM SERPL-SCNC: 3.8 MMOL/L (ref 3.5–5.1)
PROT SERPL-MCNC: 8 GM/DL (ref 6.4–8.3)
RBC # BLD AUTO: 4.12 X10(6)/MCL (ref 4.7–6.1)
SODIUM SERPL-SCNC: 138 MMOL/L (ref 136–145)
WBC # SPEC AUTO: 6.1 X10(3)/MCL (ref 4.5–11.5)

## 2022-09-15 PROCEDURE — 85025 COMPLETE CBC W/AUTO DIFF WBC: CPT | Performed by: INTERNAL MEDICINE

## 2022-09-15 PROCEDURE — 83735 ASSAY OF MAGNESIUM: CPT | Performed by: INTERNAL MEDICINE

## 2022-09-15 PROCEDURE — 63600175 PHARM REV CODE 636 W HCPCS: Performed by: INTERNAL MEDICINE

## 2022-09-15 PROCEDURE — 80053 COMPREHEN METABOLIC PANEL: CPT | Performed by: INTERNAL MEDICINE

## 2022-09-15 PROCEDURE — 21400001 HC TELEMETRY ROOM

## 2022-09-15 PROCEDURE — 99232 SBSQ HOSP IP/OBS MODERATE 35: CPT | Mod: ,,, | Performed by: INTERNAL MEDICINE

## 2022-09-15 PROCEDURE — 25000003 PHARM REV CODE 250: Performed by: EMERGENCY MEDICINE

## 2022-09-15 PROCEDURE — 25000003 PHARM REV CODE 250: Performed by: INTERNAL MEDICINE

## 2022-09-15 PROCEDURE — 84100 ASSAY OF PHOSPHORUS: CPT | Performed by: INTERNAL MEDICINE

## 2022-09-15 PROCEDURE — 27000221 HC OXYGEN, UP TO 24 HOURS

## 2022-09-15 PROCEDURE — 25000242 PHARM REV CODE 250 ALT 637 W/ HCPCS: Performed by: INTERNAL MEDICINE

## 2022-09-15 PROCEDURE — 99232 PR SUBSEQUENT HOSPITAL CARE,LEVL II: ICD-10-PCS | Mod: ,,, | Performed by: INTERNAL MEDICINE

## 2022-09-15 PROCEDURE — 80100016 HC MAINTENANCE HEMODIALYSIS

## 2022-09-15 PROCEDURE — 94761 N-INVAS EAR/PLS OXIMETRY MLT: CPT

## 2022-09-15 PROCEDURE — 36415 COLL VENOUS BLD VENIPUNCTURE: CPT | Performed by: INTERNAL MEDICINE

## 2022-09-15 RX ADMIN — PANTOPRAZOLE SODIUM 40 MG: 40 TABLET, DELAYED RELEASE ORAL at 05:09

## 2022-09-15 RX ADMIN — FERROUS SULFATE TAB 325 MG (65 MG ELEMENTAL FE) 1 EACH: 325 (65 FE) TAB at 08:09

## 2022-09-15 RX ADMIN — CALCIUM CARBONATE (ANTACID) CHEW TAB 500 MG 2000 MG: 500 CHEW TAB at 05:09

## 2022-09-15 RX ADMIN — Medication 800 MG: at 03:09

## 2022-09-15 RX ADMIN — CALCIUM CARBONATE (ANTACID) CHEW TAB 500 MG 2000 MG: 500 CHEW TAB at 08:09

## 2022-09-15 RX ADMIN — ISOSORBIDE MONONITRATE 30 MG: 30 TABLET, EXTENDED RELEASE ORAL at 08:09

## 2022-09-15 RX ADMIN — CALCIUM CARBONATE (ANTACID) CHEW TAB 500 MG 2000 MG: 500 CHEW TAB at 03:09

## 2022-09-15 RX ADMIN — HYDRALAZINE HYDROCHLORIDE 50 MG: 50 TABLET, FILM COATED ORAL at 03:09

## 2022-09-15 RX ADMIN — CALCITRIOL 0.5 MCG: 0.5 CAPSULE, LIQUID FILLED ORAL at 08:09

## 2022-09-15 RX ADMIN — CLOPIDOGREL 75 MG: 75 TABLET, FILM COATED ORAL at 08:09

## 2022-09-15 RX ADMIN — NIFEDIPINE 60 MG: 60 TABLET, FILM COATED, EXTENDED RELEASE ORAL at 08:09

## 2022-09-15 RX ADMIN — ATORVASTATIN CALCIUM 40 MG: 40 TABLET, FILM COATED ORAL at 08:09

## 2022-09-15 RX ADMIN — LABETALOL HYDROCHLORIDE 200 MG: 200 TABLET, FILM COATED ORAL at 08:09

## 2022-09-15 RX ADMIN — FUROSEMIDE 80 MG: 10 INJECTION, SOLUTION INTRAMUSCULAR; INTRAVENOUS at 05:09

## 2022-09-15 RX ADMIN — HYDRALAZINE HYDROCHLORIDE 50 MG: 50 TABLET, FILM COATED ORAL at 08:09

## 2022-09-15 RX ADMIN — ENOXAPARIN SODIUM 30 MG: 30 INJECTION SUBCUTANEOUS at 05:09

## 2022-09-15 RX ADMIN — FUROSEMIDE 80 MG: 10 INJECTION, SOLUTION INTRAMUSCULAR; INTRAVENOUS at 08:09

## 2022-09-15 RX ADMIN — SODIUM BICARBONATE 650 MG TABLET 1300 MG: at 08:09

## 2022-09-15 RX ADMIN — UMECLIDINIUM BROMIDE AND VILANTEROL TRIFENATATE 1 PUFF: 62.5; 25 POWDER RESPIRATORY (INHALATION) at 08:09

## 2022-09-15 RX ADMIN — ACETAMINOPHEN 1000 MG: 500 TABLET, FILM COATED ORAL at 08:09

## 2022-09-15 RX ADMIN — ACETAMINOPHEN 1000 MG: 500 TABLET, FILM COATED ORAL at 07:09

## 2022-09-15 RX ADMIN — Medication 800 MG: at 08:09

## 2022-09-15 RX ADMIN — ESCITALOPRAM OXALATE 10 MG: 10 TABLET ORAL at 08:09

## 2022-09-15 RX ADMIN — NEPHROCAP 1 CAPSULE: 1 CAP ORAL at 08:09
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
## 2022-09-15 NOTE — NURSING
09/15/22 1200   Post-Hemodialysis Assessment   Blood Volume Processed (Liters) 37.7 L   Dialyzer Clearance Lightly streaked   Duration of Treatment 150 minutes   Additional Fluid Intake (mL) 1500 mL   Patient Response to Treatment Tolerated well   Post-Hemodialysis Comments Treatment completed x2.5 hours. No issues during tx. Left AVF functioned well w/17 ga needles.

## 2022-09-15 NOTE — PLAN OF CARE
Pt has been accepted to [facility] @ 12:30 PM. He can start Monday, September 19th @ 12:00 PM. Welcome letter sent to NABOR Morrow.

## 2022-09-15 NOTE — PLAN OF CARE
09/15/22 1500   Discharge Assessment   Assessment Type Discharge Planning Assessment   Confirmed/corrected address, phone number and insurance Yes   Source of Information patient;family   Lives With spouse   Do you expect to return to your current living situation? Yes   Current cognitive status: Alert/Oriented   Walking or Climbing Stairs Difficulty none   Dressing/Bathing Difficulty none   Equipment Currently Used at Home CPAP   Do you currently have service(s) that help you manage your care at home? No   How do you get to doctors appointments? car, drives self   Are you on dialysis? Yes   Dialysis Name and Scheduled days New Dialysis [facility] [email or web address]   Discharge Plan A Home   Discharge Plan B Home

## 2022-09-15 NOTE — PROGRESS NOTES
"Inpatient Nutrition Evaluation    Admit Date: 9/13/2022   Total duration of encounter: 2 days    Nutrition Recommendation/Prescription     - Rec'd Renal dialysis diet    Nutrition Assessment     Chart Review    Reason Seen: continuous nutrition monitoring/ CKD    Diagnosis:  Chest pain, CKD    Relevant Medical History: CKD stage 5, DM II, COPD, HTN, TIA, GERD    Nutrition-Related Medications: Renal MVI, Mg oxide, Ferrous sulfate, calcitriol, calcium carbonate    Nutrition-Related Labs:  9/15: Phos 5.7, Cl 97, BUN 36.2, Creat 3.87, Ca 8, GFR 17    Diet Order: DIET RENAL NON-DIALYSIS  Oral Supplement Order: none at this time  Appetite/Oral Intake: good/% of meals  Factors Affecting Nutritional Intake: none identified at this time  Food/Voodoo/Cultural Preferences: none reported       Wound(s): None noted    Comments    9/15: Pt at dialysis, spouse in room. Reports good intake since admit. Pt is a new hemodialysis and will continue outpatient. Spouse asking about diet after D/C. Currently on renal- non dialysis. Discussed limiting Na, Phos, K+. Will provide with materials for take home. Dry weight ~ 212lb per spouse.    Anthropometrics    Height: 5' 9" (175.3 cm)    Last Weight: 96.5 kg (212 lb 11.9 oz) (09/15/22 0600) Weight Method: Standard Scale  BMI (Calculated): 31.4  BMI Classification: obese grade I (BMI 30-34.9)     Ideal Body Weight (IBW), Male: 160 lb  % Ideal Body Weight, Male (lb): 141.88 %      Wt Readings from Last 5 Encounters:   09/15/22 96.5 kg (212 lb 11.9 oz)   09/13/22 103.3 kg (227 lb 11.8 oz)   09/08/22 101.6 kg (223 lb 15.8 oz)   09/08/22 101.6 kg (224 lb)   08/25/22 101 kg (222 lb 10.6 oz)     Weight Change(s) Since Admission:   Admit Weight: 103 kg (227 lb) (09/13/22 1423)      Patient Education    Not applicable.    Monitoring & Evaluation     Dietitian will monitor food and beverage intake, weight change, electrolyte/renal panel, and glucose/endocrine profile.  Nutrition " Risk/Follow-Up: low (follow-up in 5-7 days)  Patients assigned 'low nutrition risk' status do not qualify for a full nutritional assessment but will be monitored and re-evaluated in a 5-7 day time period.

## 2022-09-15 NOTE — PROGRESS NOTES
09/14/22 1920   Post-Hemodialysis Assessment   Blood Volume Processed (Liters) 25 L   Dialyzer Clearance Lightly streaked   Duration of Treatment 120 minutes   Additional Fluid Intake (mL) 500 mL   Total UF (mL) 1500 mL   Net Fluid Removal 1000   Patient Response to Treatment Tolerated well, lines flushed and needles pulled and pressure held till hemsotasis achieved, no bleedingor hematoma noted   Post-Hemodialysis Comments no distress noted

## 2022-09-15 NOTE — PROGRESS NOTES
[facility]  Hospital Medicine Progress Note        Chief Complaint   Inpatient f/u for Abnormal labs     HPI- This is a 56-year-old male with medical history notable for CKD stage 5 sent to the ED by his nephrologist Dr. Lozada due to abnormal labs.  Report he has been feeling weak since his hospitalization last month with COVID-19 and hypertensive emergency, but over the past week he had been feeling very weak and dyspneic on exertion, had a blood workup done today that revealed severe hypocalcemia and hypomagnesemia.  On arrival to ED he was hypertensive and hypoxic and chest x-ray show pulmonary interstitial edema. He was evaluated by Dr. Lozada and was started on Lasix 80 mg IV twice daily and magnesium and calcium replacement and referred to hospital medicine service for further evaluation and management.       Interval Hx:   Patient just finished his hemodialysis, he was examined in hemodialysis center, dialyzed through his left AV fistula  No complaints, reports he feels okay.  Discussed we are waiting for dialysis chair time before discharge.  Verbalized understanding  No family at bedside  Case discussed with pt nurse and         Objective/physical exam:  General: In no acute distress, afebrile  Chest: Clear to auscultation bilaterally  Heart: RRR, +S1, S2, no appreciable murmur  Abdomen: Soft, nontender, BS +  MSK: Warm, no lower extremity edema, no clubbing or cyanosis  Neurologic: Alert and oriented x4, Cranial nerve II-XII intact, Strength 5/5 in all 4 extremities    VITAL SIGNS: 24 HRS MIN & MAX LAST   Temp  Min: 96.8 °F (36 °C)  Max: 98.5 °F (36.9 °C) 96.8 °F (36 °C)   BP  Min: 140/78  Max: 166/64 (!) 140/78     Pulse  Min: 66  Max: 82  66   Resp  Min: 18  Max: 20 20   SpO2  Min: 94 %  Max: 98 % 98 %       Recent Labs   Lab 09/13/22  1446 09/14/22  0148 09/15/22  0348   WBC 6.3 7.2 6.1   RBC 3.37* 3.62* 4.12*   HGB 9.1* 9.8* 10.9*   HCT 29.4* 30.7* 34.7*   MCV  87.2 84.8 84.2   MCH 27.0 27.1 26.5*   MCHC 31.0* 31.9* 31.4*   RDW 15.3 15.0 14.8    278 330   MPV 10.6* 10.1 10.3       Recent Labs   Lab 09/13/22  1446 09/14/22  0148 09/15/22  0348    140 138   K 4.4 3.8 3.8   CO2 19* 22 26   BUN 41.0* 43.6* 36.2*   CREATININE 4.57* 5.05* 3.87*   CALCIUM 5.3* 6.1* 8.0*   MG <0.60* 1.50* 2.20   ALBUMIN 3.3* 3.6 3.5   ALKPHOS 67 73 77   ALT 17 17 15   AST 24 26 22   BILITOT 0.6 0.7 0.7          Microbiology Results (last 7 days)       ** No results found for the last 168 hours. **             See below for Radiology    Scheduled Med:   atorvastatin  40 mg Oral QHS    calcitRIOL  0.5 mcg Oral Daily    calcium carbonate  2,000 mg Oral Q8H    clopidogreL  75 mg Oral Daily    enoxaparin  30 mg Subcutaneous Daily    EScitalopram oxalate  10 mg Oral Daily    ferrous sulfate  1 tablet Oral Daily    furosemide (LASIX) injection  80 mg Intravenous Q8H    hydrALAZINE  50 mg Oral TID    isosorbide mononitrate  30 mg Oral Daily    labetaloL  200 mg Oral BID    magnesium oxide  800 mg Oral TID    NIFEdipine  60 mg Oral BID    pantoprazole  40 mg Oral Daily    sodium bicarbonate  1,300 mg Oral BID    umeclidinium-vilanteroL  1 puff Inhalation Daily    vitamin renal formula (B-complex-vitamin c-folic acid)  1 capsule Oral Daily        Continuous Infusions:       PRN Meds:  acetaminophen, acetaminophen, aluminum-magnesium hydroxide-simethicone, cloNIDine, dextrose 10%, dextrose 10%, glucagon (human recombinant), glucose, glucose, hydrALAZINE, insulin aspart U-100, labetalol, melatonin, ondansetron, polyethylene glycol, prochlorperazine, senna-docusate 8.6-50 mg, simethicone, sodium chloride 0.9%, sodium chloride 0.9%       Assessment/Plan:  CKD stage 5 progressed to ESRD - Now on HD   Acute hypoxemic respiratory failure-pulmonary edema  Renal related volume overload  Hypocalcemia- improving   Hypomagnesemia- resolved   Hypertension- improved   HX Anemia of CKD, T2DM, HTN, HLD,  CAD/stent, PSVT, Anxiety, SHAYLEE/CPAP, COPD          Continue magnesium oxide 800 mg Q8H, mag normalized   Increase calcium carbonate to 2 g Q8H  Continue calcitriol 0.5 mcg daily, calcium also improving   Continue diuresis with IV lasix pushes per Nephrology given they are removing fluid slowly   Vascular evaluated the pt,. L Brachiocephalic vein fistula is mature and can be accessed with small needle   Started HD today 9/14, underwent again on 9/15 and probably again on 9/16  CM on oard for HD chair time- Per Shoshana Russell's note--> Pt has been accepted to [facility] MWF @ 12:30 PM. He can start Monday, September 19th @ 12:00 PM  Continue supplemental O2 by nasal cannula and resume CPAP at bedtime  Remaining home medication reviewed and resumed  SSI#ACHS, hold oral diabetic agents and long-acting insulin while inhouse   Morning CBC, CMP, Mag ordered     VTE prophylaxis: Enoxaparin 30 mg SC daily    Patient condition:  Stable     Anticipated discharge and Disposition:  1-2 days now that HD chair time has been secured     Critical care note:  Critical care diagnosis: Fluid overload due to ESRD requiring IV lasix pushes along with HD/ UF  Critical care interventions: Hands-on evaluation, review of labs/radiographs/records and discussion with patient and family if present  Critical care time spent: 35 minutes        All diagnosis and differential diagnosis have been reviewed; assessment and plan has been documented; I have personally reviewed the labs and test results that are presently available; I have reviewed the patients medication list; I have reviewed the consulting providers response and recommendations. I have reviewed or attempted to review medical records based upon their availability    All of the patient's questions have been  addressed and answered. Patient's is agreeable to the above stated plan. I will continue to monitor closely and make adjustments to medical management as  needed.  _____________________________________________________________________    Nutrition Status:    Radiology:  X-Ray Chest 1 View  Narrative: EXAMINATION:  XR CHEST 1 VIEW    CLINICAL HISTORY:  chest pain;    TECHNIQUE:  Single frontal view of the chest was performed.    COMPARISON:  08/15/2022    FINDINGS:  LINES AND TUBES: EKG/telemetry leads overlie the chest.    MEDIASTINUM AND WILLIAMS: Cardiac silhouette is enlarged.    LUNGS: Vascular congestion with interstitial edema.    PLEURA:No pleural effusion. No pneumothorax.    BONES: Postop ACDF.  Impression: Enlarged cardiac silhouette with pulmonary interstitial edema    Electronically signed by: Ne Amaya  Date:    09/13/2022  Time:    17:26  CT Chest Without Contrast  Narrative: EXAMINATION:  CT CHEST WITHOUT CONTRAST    CLINICAL HISTORY:  Lung nodule, 6-8mm;, Solitary pulmonary nodule.    TECHNIQUE:  PATIENT RADIATION DOSE: DLP(mGycm) 532.80    As per PQRS measures, all CT scans at this facility used dose modulation, iterative reconstruction, and/or weight based dose adjustment when appropriate to reduce radiation dose to as low as reasonably achievable.    COMPARISON:  01/20/2022    FINDINGS:  Serial axial imaging of the chest without the administration of IV contrast.  Both soft tissue and pulmonary parenchymal windows were obtained.  Additional sagittal and coronal reconstructions were performed.Degenerative changes are noted to the thoracolumbar spine.  There is mild heterogeneity of the thyroid lobes.  Atherosclerosis is seen within the aorta and branching vessels.  A few ill-defined prominent lymph nodes are again seen within the mediastinum.  The heart is mildly enlarged.  Ill-defined fatty lymph nodes are seen within the axilla bilaterally.  The airways are grossly patent.  There is slight increase in size of the small bilateral posterior pleural effusions.  There is increasing prominence of the pulmonary vasculature with mild hazy interstitial  opacities scattered throughout the mid and lower lungs.  Impression: 1. Interim increasing small bilateral pleural effusion with associated minimal infiltrate and or atelectasis at the posterior lower lungs  2. Interim increasing prominence of pulmonary vasculature with increasing hazy ground-glass interstitial opacities at the mid and lower lungs bilaterally suggesting pulmonary vasculature congestion and early interstitial pulmonary edema.  Clinical correlation is indicated  3. Mild cardiomegaly  4. Ill-defined mildly prominent lymph nodes again identified within the mediastinum  5. Atherosclerosis    Electronically signed by: Josiah Lucio  Date:    09/13/2022  Time:    11:03      Riki Mike MD   09/15/2022

## 2022-09-15 NOTE — PROGRESS NOTES
[facility] Nephrology Inpatient Progress Note      HPI:     Patient Name: Nicholas Vera  Admission Date: 9/13/2022  Hospital Length of Stay: 2 days  Code Status: Full Code   Attending Physician: Riki Mike MD   Primary Care Physician: Saqib Emery Jr, MD  Principal Problem:CKD (chronic kidney disease), stage V      Today pt seen and examined  Labs, events, imaging and meds reviewed for this hospital stay  After clearance from vascular surgery, AV fistula was accessed using 17 gauge needle  Tolerated slow dialysis well yesterday  Dyspnea a bit better  Still having intermittent twitching      Review of Systems:   Constitutional: Denies fever, fatigue, generalized weakness, night sweats, or acute weight change  Skin: Denies wounds, no rashes, no itching, no new skin lesions  HEENT: Denies acute change in hearing or vision, tinnitus, or dysphagia  Respiratory:  less dyspnea  Cardiovascular: Denies chest pain, palpitations, or swelling  Gastrointestional: Denies abdominal pain, nausea, vomiting, diarrhea, or constipation  Genitourinary: Denies dysuria, hematuria, or incontinence; reports able to empty bladder  Musculoskeletal: occ twitchy movements  Neurological: Denies headaches, seizures, dizziness,  Hematological: Denies unusual bruising or bleeding  Psychiatric: Denies hallucinations, depression, or confusion, more alert, less confusion      Medications:   Scheduled Meds:   atorvastatin  40 mg Oral QHS    calcitRIOL  0.5 mcg Oral Daily    calcium carbonate  2,000 mg Oral Q8H    clopidogreL  75 mg Oral Daily    enoxaparin  30 mg Subcutaneous Daily    EScitalopram oxalate  10 mg Oral Daily    ferrous sulfate  1 tablet Oral Daily    furosemide (LASIX) injection  80 mg Intravenous Q8H    hydrALAZINE  50 mg Oral TID    isosorbide mononitrate  30 mg Oral Daily    labetaloL  200 mg Oral BID    magnesium oxide  800 mg Oral TID    NIFEdipine  60 mg Oral BID    pantoprazole  40 mg Oral Daily    sodium bicarbonate  1,300  mg Oral BID    umeclidinium-vilanteroL  1 puff Inhalation Daily    vitamin renal formula (B-complex-vitamin c-folic acid)  1 capsule Oral Daily     Continuous Infusions:      Vitals:     Vitals:    09/15/22 0000 09/15/22 0339 09/15/22 0400 09/15/22 0600   BP:  (!) 140/78     Pulse: 73 67 66    Resp:  20     Temp:  96.8 °F (36 °C)     TempSrc:  Axillary     SpO2: 96% 96% 98%    Weight:    96.5 kg (212 lb 11.9 oz)   Height:             I/O last 3 completed shifts:  In: 2370 [P.O.:1820; I.V.:50; Other:500]  Out: 4900 [Urine:3400; Other:1500]    Intake/Output Summary (Last 24 hours) at 9/15/2022 0737  Last data filed at 9/15/2022 0600  Gross per 24 hour   Intake 2080 ml   Output 4900 ml   Net -2820 ml       Physical Exam:   General: no acute distress, awake, alert  Eyes: PERRLA, EOMI, conjunctiva clear, ++periorbital edema  HENT: atraumatic, oropharynx and nasal mucosa patent  Neck: full ROM, no JVD, no thyromegaly or lymphadenopathy  Respiratory: equal, unlabored, rhonchi and exp wheezing  Cardiovascular: RRR without rub; BL radial and pedal pulses felt  Edema: none  Gastrointestinal: soft, non-tender, non-distended; positive bowel sounds; no masses to palpation  Genitourinary: no CVA tenderness upon palpation  Musculoskeletal:  ROM without limitation or discomfort  Integumentary: warm, dry; no rashes, wounds, or skin lesions  Neurological: oriented, appropriate, no acute deficits  Dialysis access:  L AV fistula      Labs:     Cardiac Enzymes: Ejection Fractions:    Recent Labs     09/13/22  1446   TROPONINI 0.039    EF   Date Value Ref Range Status   08/16/2022 60 % Final        Chemistries:   Recent Labs   Lab 09/13/22  1122 09/13/22  1446 09/14/22  0148 09/15/22  0348    138 140 138   K 4.7 4.4 3.8 3.8   CO2 22 19* 22 26   BUN 42.9* 41.0* 43.6* 36.2*   CREATININE 5.04* 4.57* 5.05* 3.87*   CALCIUM 5.6* 5.3* 6.1* 8.0*   BILITOT 0.7 0.6 0.7 0.7   ALKPHOS 66 67 73 77   ALT 17 17 17 15   AST 25 24 26 22   GLUCOSE  116* 143* 56* 76   MG  --  <0.60* 1.50* 2.20   PHOS 4.6  --  5.3* 5.7*        CBC/Anemia Labs: Coags:    Recent Labs   Lab 09/13/22  1446 09/14/22  0148 09/15/22  0348   WBC 6.3 7.2 6.1   HGB 9.1* 9.8* 10.9*   HCT 29.4* 30.7* 34.7*    278 330   MCV 87.2 84.8 84.2   RDW 15.3 15.0 14.8    No results for input(s): PT, INR, APTT in the last 168 hours.     POCT Glucose: HbA1c:    Recent Labs   Lab 09/13/22 2052 09/14/22  0526 09/14/22  0527 09/14/22  0619 09/14/22  2006 09/15/22  0531   POCTGLUCOSE 158* 47* 49* 73 157* 90    Hemoglobin A1C   Date Value Ref Range Status   06/09/2020 8.4 (H) <=6.5 % Final   09/21/2012 7.1 (H) 4.0 - 6.2 % Final     Hemoglobin A1c   Date Value Ref Range Status   08/11/2022 5.9 <=7.0 % Final          Impression:       Patient Active Problem List   Diagnosis    CKD (chronic kidney disease), stage V    Iron deficiency anemia due to chronic blood loss    Cervical spondylosis with myelopathy            Hypocalcemia    Hypomagnesemia     ESRD  Uremia  Hypocalcemia and hypomagnesemia better  Pulm congestion better    Plan:        HD today  Possible  HD in am  Cont mag ox/CaCO3/calcitriol  Arrange for outpt HD  Labs in am    Guido Lozada

## 2022-09-16 VITALS
HEART RATE: 73 BPM | TEMPERATURE: 98 F | HEIGHT: 69 IN | WEIGHT: 211.19 LBS | SYSTOLIC BLOOD PRESSURE: 160 MMHG | RESPIRATION RATE: 15 BRPM | OXYGEN SATURATION: 95 % | DIASTOLIC BLOOD PRESSURE: 66 MMHG | BODY MASS INDEX: 31.28 KG/M2

## 2022-09-16 PROBLEM — E83.51 HYPOCALCEMIA: Status: RESOLVED | Noted: 2022-09-13 | Resolved: 2022-09-16

## 2022-09-16 PROBLEM — N18.6 ESRD (END STAGE RENAL DISEASE): Status: RESOLVED | Noted: 2022-09-16 | Resolved: 2022-09-16

## 2022-09-16 PROBLEM — N18.6 ESRD (END STAGE RENAL DISEASE): Status: ACTIVE | Noted: 2022-09-16

## 2022-09-16 LAB
ALBUMIN SERPL-MCNC: 3.4 GM/DL (ref 3.5–5)
ALBUMIN/GLOB SERPL: 0.8 RATIO (ref 1.1–2)
ALP SERPL-CCNC: 74 UNIT/L (ref 40–150)
ALT SERPL-CCNC: 13 UNIT/L (ref 0–55)
AST SERPL-CCNC: 20 UNIT/L (ref 5–34)
BASOPHILS # BLD AUTO: 0.08 X10(3)/MCL (ref 0–0.2)
BASOPHILS NFR BLD AUTO: 1.2 %
BILIRUBIN DIRECT+TOT PNL SERPL-MCNC: 0.6 MG/DL
BUN SERPL-MCNC: 33.7 MG/DL (ref 8.4–25.7)
CALCIUM SERPL-MCNC: 8.9 MG/DL (ref 8.4–10.2)
CHLORIDE SERPL-SCNC: 96 MMOL/L (ref 98–107)
CO2 SERPL-SCNC: 27 MMOL/L (ref 22–29)
CREAT SERPL-MCNC: 3.96 MG/DL (ref 0.73–1.18)
EOSINOPHIL # BLD AUTO: 0.37 X10(3)/MCL (ref 0–0.9)
EOSINOPHIL NFR BLD AUTO: 5.6 %
ERYTHROCYTE [DISTWIDTH] IN BLOOD BY AUTOMATED COUNT: 14.5 % (ref 11.5–17)
GFR SERPLBLD CREATININE-BSD FMLA CKD-EPI: 17 MLS/MIN/1.73/M2
GLOBULIN SER-MCNC: 4.3 GM/DL (ref 2.4–3.5)
GLUCOSE SERPL-MCNC: 109 MG/DL (ref 74–100)
HCT VFR BLD AUTO: 34.1 % (ref 42–52)
HGB BLD-MCNC: 10.9 GM/DL (ref 14–18)
IMM GRANULOCYTES # BLD AUTO: 0.04 X10(3)/MCL (ref 0–0.04)
IMM GRANULOCYTES NFR BLD AUTO: 0.6 %
LYMPHOCYTES # BLD AUTO: 0.82 X10(3)/MCL (ref 0.6–4.6)
LYMPHOCYTES NFR BLD AUTO: 12.3 %
MAGNESIUM SERPL-MCNC: 2.2 MG/DL (ref 1.6–2.6)
MCH RBC QN AUTO: 27.1 PG (ref 27–31)
MCHC RBC AUTO-ENTMCNC: 32 MG/DL (ref 33–36)
MCV RBC AUTO: 84.8 FL (ref 80–94)
MONOCYTES # BLD AUTO: 0.78 X10(3)/MCL (ref 0.1–1.3)
MONOCYTES NFR BLD AUTO: 11.7 %
NEUTROPHILS # BLD AUTO: 4.6 X10(3)/MCL (ref 2.1–9.2)
NEUTROPHILS NFR BLD AUTO: 68.6 %
NRBC BLD AUTO-RTO: 0 %
PLATELET # BLD AUTO: 351 X10(3)/MCL (ref 130–400)
PMV BLD AUTO: 10.4 FL (ref 7.4–10.4)
POCT GLUCOSE: 115 MG/DL (ref 70–110)
POTASSIUM SERPL-SCNC: 4 MMOL/L (ref 3.5–5.1)
PROT SERPL-MCNC: 7.7 GM/DL (ref 6.4–8.3)
RBC # BLD AUTO: 4.02 X10(6)/MCL (ref 4.7–6.1)
SODIUM SERPL-SCNC: 135 MMOL/L (ref 136–145)
WBC # SPEC AUTO: 6.6 X10(3)/MCL (ref 4.5–11.5)

## 2022-09-16 PROCEDURE — 25000003 PHARM REV CODE 250: Performed by: EMERGENCY MEDICINE

## 2022-09-16 PROCEDURE — 25000003 PHARM REV CODE 250: Performed by: INTERNAL MEDICINE

## 2022-09-16 PROCEDURE — 99232 PR SUBSEQUENT HOSPITAL CARE,LEVL II: ICD-10-PCS | Mod: ,,, | Performed by: INTERNAL MEDICINE

## 2022-09-16 PROCEDURE — 36415 COLL VENOUS BLD VENIPUNCTURE: CPT

## 2022-09-16 PROCEDURE — 90935 HEMODIALYSIS ONE EVALUATION: CPT

## 2022-09-16 PROCEDURE — 85025 COMPLETE CBC W/AUTO DIFF WBC: CPT

## 2022-09-16 PROCEDURE — 25000242 PHARM REV CODE 250 ALT 637 W/ HCPCS: Performed by: INTERNAL MEDICINE

## 2022-09-16 PROCEDURE — 80053 COMPREHEN METABOLIC PANEL: CPT

## 2022-09-16 PROCEDURE — 83735 ASSAY OF MAGNESIUM: CPT

## 2022-09-16 PROCEDURE — 99232 SBSQ HOSP IP/OBS MODERATE 35: CPT | Mod: ,,, | Performed by: INTERNAL MEDICINE

## 2022-09-16 PROCEDURE — 63600175 PHARM REV CODE 636 W HCPCS: Performed by: INTERNAL MEDICINE

## 2022-09-16 RX ORDER — TORSEMIDE 20 MG/1
40 TABLET ORAL DAILY
Status: DISCONTINUED | OUTPATIENT
Start: 2022-09-16 | End: 2022-09-16 | Stop reason: HOSPADM

## 2022-09-16 RX ORDER — CLONIDINE HYDROCHLORIDE 0.2 MG/1
0.2 TABLET ORAL 3 TIMES DAILY PRN
Qty: 90 TABLET | Refills: 1 | Status: SHIPPED | OUTPATIENT
Start: 2022-09-16 | End: 2023-07-24

## 2022-09-16 RX ORDER — HYDRALAZINE HYDROCHLORIDE 50 MG/1
50 TABLET, FILM COATED ORAL 3 TIMES DAILY
Qty: 90 TABLET | Refills: 1 | Status: SHIPPED | OUTPATIENT
Start: 2022-09-16 | End: 2022-11-08

## 2022-09-16 RX ORDER — CALCITRIOL 0.5 UG/1
0.5 CAPSULE ORAL DAILY
Qty: 30 CAPSULE | Refills: 1 | Status: SHIPPED | OUTPATIENT
Start: 2022-09-16 | End: 2023-07-24

## 2022-09-16 RX ORDER — FERROUS SULFATE 324(65)MG
324 TABLET, DELAYED RELEASE (ENTERIC COATED) ORAL DAILY
Qty: 30 TABLET | COMMUNITY
Start: 2022-09-16 | End: 2023-07-24

## 2022-09-16 RX ADMIN — ISOSORBIDE MONONITRATE 30 MG: 30 TABLET, EXTENDED RELEASE ORAL at 09:09

## 2022-09-16 RX ADMIN — ESCITALOPRAM OXALATE 10 MG: 10 TABLET ORAL at 09:09

## 2022-09-16 RX ADMIN — PANTOPRAZOLE SODIUM 40 MG: 40 TABLET, DELAYED RELEASE ORAL at 05:09

## 2022-09-16 RX ADMIN — TORSEMIDE 40 MG: 20 TABLET ORAL at 09:09

## 2022-09-16 RX ADMIN — HYDRALAZINE HYDROCHLORIDE 50 MG: 50 TABLET, FILM COATED ORAL at 09:09

## 2022-09-16 RX ADMIN — UMECLIDINIUM BROMIDE AND VILANTEROL TRIFENATATE 1 PUFF: 62.5; 25 POWDER RESPIRATORY (INHALATION) at 08:09

## 2022-09-16 RX ADMIN — FERROUS SULFATE TAB 325 MG (65 MG ELEMENTAL FE) 1 EACH: 325 (65 FE) TAB at 08:09

## 2022-09-16 RX ADMIN — CALCITRIOL 0.5 MCG: 0.5 CAPSULE, LIQUID FILLED ORAL at 09:09

## 2022-09-16 RX ADMIN — FUROSEMIDE 80 MG: 10 INJECTION, SOLUTION INTRAMUSCULAR; INTRAVENOUS at 05:09

## 2022-09-16 RX ADMIN — CLOPIDOGREL 75 MG: 75 TABLET, FILM COATED ORAL at 08:09

## 2022-09-16 RX ADMIN — LABETALOL HYDROCHLORIDE 200 MG: 200 TABLET, FILM COATED ORAL at 09:09

## 2022-09-16 RX ADMIN — NEPHROCAP 1 CAPSULE: 1 CAP ORAL at 08:09

## 2022-09-16 RX ADMIN — CALCIUM CARBONATE (ANTACID) CHEW TAB 500 MG 2000 MG: 500 CHEW TAB at 05:09

## 2022-09-16 RX ADMIN — NIFEDIPINE 60 MG: 60 TABLET, FILM COATED, EXTENDED RELEASE ORAL at 08:09

## 2022-09-16 NOTE — PROGRESS NOTES
[facility] Nephrology Inpatient Progress Note      HPI:     Patient Name: Nicholas Vera  Admission Date: 9/13/2022  Hospital Length of Stay: 3 days  Code Status: Full Code   Attending Physician: No att. providers found   Primary Care Physician: Saqib Emery Jr, MD  Principal Problem:ESRD (end stage renal disease)      Today pt seen and examined  Labs, events, imaging and meds reviewed for this hospital stay  Pt seen and examined  Feels better  No SOB  More alert and responsive      Review of Systems:   More alert and responsive  No tremors  No SOB  No fevers  Ready to go home  No new developments  Otherwise negative      Medications:   Scheduled Meds:   atorvastatin  40 mg Oral QHS    calcitRIOL  0.5 mcg Oral Daily    clopidogreL  75 mg Oral Daily    enoxaparin  30 mg Subcutaneous Daily    EScitalopram oxalate  10 mg Oral Daily    ferrous sulfate  1 tablet Oral Daily    hydrALAZINE  50 mg Oral TID    isosorbide mononitrate  30 mg Oral Daily    labetaloL  200 mg Oral BID    NIFEdipine  60 mg Oral BID    pantoprazole  40 mg Oral Daily    torsemide  40 mg Oral Daily    umeclidinium-vilanteroL  1 puff Inhalation Daily    vitamin renal formula (B-complex-vitamin c-folic acid)  1 capsule Oral Daily     Continuous Infusions:      Vitals:     Vitals:    09/16/22 0400 09/16/22 0500 09/16/22 0525 09/16/22 0700   BP:   (!) 160/68 (!) 160/66   Pulse: 63  72 73   Resp:   20 15   Temp:   97.9 °F (36.6 °C) 97.7 °F (36.5 °C)   TempSrc:   Oral Oral   SpO2:   95% 95%   Weight:  95.8 kg (211 lb 3.2 oz)     Height:             I/O last 3 completed shifts:  In: 2840 [P.O.:840; Other:2000]  Out: 2500 [Urine:1000; Other:1500]    Intake/Output Summary (Last 24 hours) at 9/16/2022 0950  Last data filed at 9/16/2022 0500  Gross per 24 hour   Intake 2100 ml   Output --   Net 2100 ml       Physical Exam:   General: no acute distress, awake, alert  Eyes: PERRLA, EOMI, conjunctiva clear, eyelids without swelling  HENT: atraumatic, oropharynx  and nasal mucosa patent  Neck: full ROM, no JVD, no thyromegaly or lymphadenopathy  Respiratory: equal, unlabored, clear to auscultation A/P  Cardiovascular: RRR without murmur or rub; BL radial and pedal pulses felt  Edema: none  Gastrointestinal: soft, non-tender, non-distended; positive bowel sounds; no masses to palpation  Genitourinary: no CVA tenderness upon palpation  Musculoskeletal: full ROM without limitation or discomfort  Integumentary: warm, dry; no rashes, wounds, or skin lesions  Neurological: oriented, appropriate, no acute deficits  Dialysis access:  L AVfistula      Labs:     Cardiac Enzymes: Ejection Fractions:    Recent Labs     09/13/22  1446   TROPONINI 0.039    EF   Date Value Ref Range Status   08/16/2022 60 % Final        Chemistries:   Recent Labs   Lab 09/13/22  1122 09/13/22  1446 09/14/22 0148 09/15/22  0348 09/16/22  0522      < > 140 138 135*   K 4.7   < > 3.8 3.8 4.0   CO2 22   < > 22 26 27   BUN 42.9*   < > 43.6* 36.2* 33.7*   CREATININE 5.04*   < > 5.05* 3.87* 3.96*   CALCIUM 5.6*   < > 6.1* 8.0* 8.9   BILITOT 0.7   < > 0.7 0.7 0.6   ALKPHOS 66   < > 73 77 74   ALT 17   < > 17 15 13   AST 25   < > 26 22 20   GLUCOSE 116*   < > 56* 76 109*   MG  --    < > 1.50* 2.20 2.20   PHOS 4.6  --  5.3* 5.7*  --     < > = values in this interval not displayed.        CBC/Anemia Labs: Coags:    Recent Labs   Lab 09/14/22  0148 09/15/22  0348 09/16/22  0522   WBC 7.2 6.1 6.6   HGB 9.8* 10.9* 10.9*   HCT 30.7* 34.7* 34.1*    330 351   MCV 84.8 84.2 84.8   RDW 15.0 14.8 14.5    No results for input(s): PT, INR, APTT in the last 168 hours.     POCT Glucose: HbA1c:    Recent Labs   Lab 09/14/22  0527 09/14/22  0619 09/14/22  1116 09/14/22  2006 09/15/22  0531 09/16/22  0531   POCTGLUCOSE 49* 73 227* 157* 90 115*    Hemoglobin A1C   Date Value Ref Range Status   06/09/2020 8.4 (H) <=6.5 % Final   09/21/2012 7.1 (H) 4.0 - 6.2 % Final     Hemoglobin A1c   Date Value Ref Range Status    08/11/2022 5.9 <=7.0 % Final          Impression:       Patient Active Problem List   Diagnosis    CKD (chronic kidney disease), stage V    Iron deficiency anemia due to chronic blood loss    Cervical spondylosis with myelopathy            Hypomagnesemia   ESRD  Uremic SS much improved      Plan:   HD today  Ok to DC home post HD  Pt has HD scheduled MWF at [facility] garrett Lozada

## 2022-09-16 NOTE — NURSING
09/16/22 1300   Post-Hemodialysis Assessment   Blood Volume Processed (Liters) 58.8 L   Dialyzer Clearance Lightly streaked   Duration of Treatment 210 minutes   Total UF (mL) 2000 mL   Patient Response to Treatment Tolerated well   Post-Hemodialysis Comments Tx completed, pt reinfused. AVF deaccessed per P&P, pressure applied until hemostasis achieved. NET removed= 2000ml

## 2022-09-16 NOTE — DISCHARGE SUMMARY
[facility] Hospital Medicine Discharge Summary    Admit Date: 9/13/2022  Discharge Date and Time: 9/16/20229:02 AM  Admitting Physician:  Team  Discharging Physician: Riki Mike MD.  Primary Care Physician: Saqib Emery Jr, MD  Consults: Nephrology and Vascular surgery    Discharge Diagnoses:  CKD stage 5 progressed to ESRD - Now on HD   Acute hypoxemic respiratory failure-pulmonary edema  Renal related volume overload  Hypocalcemia- improving   Hypomagnesemia- resolved   Hypertension- improved   HX Anemia of CKD, T2DM, HTN, HLD, CAD/stent, PSVT, Anxiety, SHAYLEE/CPAP, COPD    Hospital Course:   This is a 56-year-old male with medical history notable for CKD stage 5 sent to the ED by his nephrologist Dr. Lozada due to abnormal labs.  Report he has been feeling weak since his hospitalization last month with COVID-19 and hypertensive emergency, but over the past week he had been feeling very weak and dyspneic on exertion, had a blood workup done today that revealed severe hypocalcemia and hypomagnesemia.  On arrival to ED he was hypertensive and hypoxic and chest x-ray show pulmonary interstitial edema. He was evaluated by Dr. Lozada and was started on Lasix 80 mg IV twice daily and magnesium and calcium replacement and referred to hospital medicine service for further evaluation and management  Continue magnesium oxide 800 mg Q8H, mag normalized   Increase calcium carbonate to 2 g Q8H  Continue calcitriol 0.5 mcg daily, calcium also improving   Continue diuresis with IV lasix pushes per Nephrology given they are removing fluid slowly   Vascular evaluated the pt,. L Brachiocephalic vein fistula is mature and can be accessed with small needle   Started HD today 9/14, underwent again on 9/15 and 9/16  CM on oard for HD chair time- Per Shoshana Russell's note--> Pt has been accepted to [facility] @ 12:30 PM. He can start Monday, September 19th @ 12:00 PM  Continue supplemental  O2 by nasal cannula and resume CPAP at bedtime  Remaining home medication reviewed and resumed  SSI#ACHS, hold oral diabetic agents and long-acting insulin while inhouse   Pt was seen and examined on the day of discharge, wanting to go home today after HD. Case personally discussed with Dr. Lozada, patient will have his 3rd dialysis today and then he can go home after her with resumption of Monday Wednesday Friday schedule as outpatient    Vitals:  VITAL SIGNS: 24 HRS MIN & MAX LAST   Temp  Min: 97.7 °F (36.5 °C)  Max: 98.1 °F (36.7 °C) 97.7 °F (36.5 °C)   BP  Min: 130/74  Max: 171/70 (!) 160/66     Pulse  Min: 63  Max: 96  73   Resp  Min: 15  Max: 20 15   SpO2  Min: 90 %  Max: 98 % 95 %       Physical Exam:  General: In no acute distress, afebrile  Chest: Clear to auscultation bilaterally  Heart: RRR, +S1, S2, no appreciable murmur  Abdomen: Soft, nontender, BS +  MSK: Warm, no lower extremity edema, no clubbing or cyanosis  Neurologic: Alert and oriented x4, Cranial nerve II-XII intact, Strength 5/5 in all 4 extremities    Procedures Performed: No admission procedures for hospital encounter.     Significant Diagnostic Studies: See Full reports for all details    Recent Labs   Lab 09/14/22  0148 09/15/22  0348 09/16/22  0522   WBC 7.2 6.1 6.6   RBC 3.62* 4.12* 4.02*   HGB 9.8* 10.9* 10.9*   HCT 30.7* 34.7* 34.1*   MCV 84.8 84.2 84.8   MCH 27.1 26.5* 27.1   MCHC 31.9* 31.4* 32.0*   RDW 15.0 14.8 14.5    330 351   MPV 10.1 10.3 10.4       Recent Labs   Lab 09/14/22  0148 09/15/22  0348 09/16/22  0522    138 135*   K 3.8 3.8 4.0   CO2 22 26 27   BUN 43.6* 36.2* 33.7*   CREATININE 5.05* 3.87* 3.96*   CALCIUM 6.1* 8.0* 8.9   MG 1.50* 2.20 2.20   ALBUMIN 3.6 3.5 3.4*   ALKPHOS 73 77 74   ALT 17 15 13   AST 26 22 20   BILITOT 0.7 0.7 0.6        Microbiology Results (last 7 days)       ** No results found for the last 168 hours. **             X-Ray Chest 1 View  Narrative: EXAMINATION:  XR CHEST 1  VIEW    CLINICAL HISTORY:  chest pain;    TECHNIQUE:  Single frontal view of the chest was performed.    COMPARISON:  08/15/2022    FINDINGS:  LINES AND TUBES: EKG/telemetry leads overlie the chest.    MEDIASTINUM AND WILLIAMS: Cardiac silhouette is enlarged.    LUNGS: Vascular congestion with interstitial edema.    PLEURA:No pleural effusion. No pneumothorax.    BONES: Postop ACDF.  Impression: Enlarged cardiac silhouette with pulmonary interstitial edema    Electronically signed by: Ne Amaya  Date:    09/13/2022  Time:    17:26  CT Chest Without Contrast  Narrative: EXAMINATION:  CT CHEST WITHOUT CONTRAST    CLINICAL HISTORY:  Lung nodule, 6-8mm;, Solitary pulmonary nodule.    TECHNIQUE:  PATIENT RADIATION DOSE: DLP(mGycm) 532.80    As per PQRS measures, all CT scans at this facility used dose modulation, iterative reconstruction, and/or weight based dose adjustment when appropriate to reduce radiation dose to as low as reasonably achievable.    COMPARISON:  01/20/2022    FINDINGS:  Serial axial imaging of the chest without the administration of IV contrast.  Both soft tissue and pulmonary parenchymal windows were obtained.  Additional sagittal and coronal reconstructions were performed.Degenerative changes are noted to the thoracolumbar spine.  There is mild heterogeneity of the thyroid lobes.  Atherosclerosis is seen within the aorta and branching vessels.  A few ill-defined prominent lymph nodes are again seen within the mediastinum.  The heart is mildly enlarged.  Ill-defined fatty lymph nodes are seen within the axilla bilaterally.  The airways are grossly patent.  There is slight increase in size of the small bilateral posterior pleural effusions.  There is increasing prominence of the pulmonary vasculature with mild hazy interstitial opacities scattered throughout the mid and lower lungs.  Impression: 1. Interim increasing small bilateral pleural effusion with associated minimal infiltrate and or  atelectasis at the posterior lower lungs  2. Interim increasing prominence of pulmonary vasculature with increasing hazy ground-glass interstitial opacities at the mid and lower lungs bilaterally suggesting pulmonary vasculature congestion and early interstitial pulmonary edema.  Clinical correlation is indicated  3. Mild cardiomegaly  4. Ill-defined mildly prominent lymph nodes again identified within the mediastinum  5. Atherosclerosis    Electronically signed by: Josiah Lucio  Date:    09/13/2022  Time:    11:03         Medication List        START taking these medications      calcitRIOL 0.5 MCG Cap  Commonly known as: ROCALTROL  Take 1 capsule (0.5 mcg total) by mouth once daily.     cloNIDine 0.2 MG tablet  Commonly known as: CATAPRES  Take 1 tablet (0.2 mg total) by mouth 3 (three) times daily as needed (SBP > 160).     ferrous sulfate 324 mg (65 mg iron) Tbec  Take 1 tablet (324 mg total) by mouth once daily.     vitamin renal formula (B-complex-vitamin c-folic acid) 1 mg Cap  Commonly known as: NEPHROCAP  Take 1 capsule by mouth once daily.            CHANGE how you take these medications      cholecalciferol (vitamin D3) 125 mcg (5,000 unit) Tab  What changed: Another medication with the same name was removed. Continue taking this medication, and follow the directions you see here.     hydrALAZINE 50 MG tablet  Commonly known as: APRESOLINE  Take 1 tablet (50 mg total) by mouth 3 (three) times daily.  What changed:   medication strength  how much to take     torsemide 40 mg Tab  Take 40 mg by mouth once daily.  What changed:   medication strength  when to take this            CONTINUE taking these medications      albuterol 90 mcg/actuation inhaler  Commonly known as: PROVENTIL/VENTOLIN HFA     ANORO ELLIPTA 62.5-25 mcg/actuation Dsdv  Generic drug: umeclidinium-vilanteroL     ascorbic acid (vitamin C) 500 MG tablet  Commonly known as: VITAMIN C     BASAGLAR KWIKPEN U-100 INSULIN glargine 100 units/mL  SubQ pen  Generic drug: insulin     CENTRUM MEN ORAL     clopidogreL 75 mg tablet  Commonly known as: PLAVIX     EScitalopram oxalate 10 MG tablet  Commonly known as: LEXAPRO     fenofibrate 145 MG tablet  Commonly known as: TRICOR     glimepiride 4 MG tablet  Commonly known as: AMARYL     isosorbide mononitrate 30 MG 24 hr tablet  Commonly known as: IMDUR  TAKE 1 TABLET BY MOUTH ONCE DAILY     labetaloL 200 MG tablet  Commonly known as: NORMODYNE     NIFEdipine 60 MG Tbsr  Commonly known as: ADALAT CC     nitroGLYCERIN 0.4 MG SL tablet  Commonly known as: NITROSTAT     pantoprazole 40 MG tablet  Commonly known as: PROTONIX     pulse oximeter device  Commonly known as: pulse oximeter  Use twice daily at 8 AM and 3 PM and record the value in MyChart as directed.    **Please Mail to patient**     rosuvastatin 20 MG tablet  Commonly known as: CRESTOR     sodium bicarbonate 650 MG tablet  Take 2 tablets (1,300 mg total) by mouth 2 (two) times daily.     terazosin 2 MG capsule  Commonly known as: HYTRIN     TRULICITY 1.5 mg/0.5 mL pen injector  Generic drug: dulaglutide     VASCEPA 1 gram Cap  Generic drug: icosapent ethyL     XYOSTED 100 mg/0.5 mL Atin  Generic drug: testosterone enanthate            STOP taking these medications      cyclobenzaprine 10 MG tablet  Commonly known as: FLEXERIL     FARXIGA 10 mg tablet  Generic drug: dapagliflozin               Where to Get Your Medications        These medications were sent to [id number] [place] [facility] - [address]  [address] [place] [address]      Phone: [phone]   calcitRIOL 0.5 MCG Cap  cloNIDine 0.2 MG tablet  hydrALAZINE 50 MG tablet  torsemide 40 mg Tab  vitamin renal formula (B-complex-vitamin c-folic acid) 1 mg Cap       You can get these medications from any pharmacy    You don't need a prescription for these medications  ferrous sulfate 324 mg (65 mg iron) Tbec          Explained in detail to the patient about the discharge plan,  medications, and follow-up visits. Pt understands and agrees with the treatment plan  Discharge Disposition: Home or Self Care   Discharged Condition: stable  Diet-   Dietary Orders (From admission, onward)       Start     Ordered    09/13/22 1837  DIET RENAL NON-DIALYSIS  Diet effective now         09/13/22 1836                   Medications Per DC med rec  Activities as tolerated   Follow-up Information       Saqib Emery Jr, MD Follow up on 9/22/2022.    Specialty: Internal Medicine  Why: @ 1:45pm  Contact information:  [address] [place] [address]  [address]  [place] [address]  [phone]               Guido Lozada MD. Schedule an appointment as soon as possible for a visit in 2 week(s).    Specialty: Nephrology  Why: @  post hospital discharge  Contact information:  [facility] [place] [address]  [phone]                           For further questions contact hospitalist office    Discharge time 34 minutes    For worsening symptoms, chest pain, shortness of breath, increased abdominal pain, high grade fever, stroke or stroke like symptoms, immediately go to the nearest Emergency Room or call 911 as soon as possible.      Riki Mike MD  Department of Hospital Medicine   [facility]   09/16/2022 9:02 AM

## 2022-09-18 LAB — POCT GLUCOSE: 185 MG/DL (ref 70–110)

## 2022-09-20 ENCOUNTER — TELEPHONE (OUTPATIENT)
Dept: TRANSPLANT | Facility: CLINIC | Age: 57
End: 2022-09-20
Payer: COMMERCIAL

## 2022-09-20 ENCOUNTER — OUTSIDE PLACE OF SERVICE (OUTPATIENT)
Dept: NEPHROLOGY | Facility: CLINIC | Age: 57
End: 2022-09-20
Payer: COMMERCIAL

## 2022-09-20 NOTE — LETTER
September 20, 2022    Guido Lozada  [facility] [address]  Phone: [phone]  Fax: [phone]    Dear Dr. Guido Lozada:    Patient: Nicholas Vera  MR Number [id number]  Date of Birth 1965    Thank you for your referral of Nicholas Vera to our transplant program.      Your patient's information will be screened by our Referral Nurse Coordinators to determine initial medical candidacy and if any further records are required. We will contact you if further information is needed to process the referral.     Once all needed information is received it will be forwarded to our Transplant Financial Coordinators who will obtain insurance authorization. Upon insurance approval, your patient will be contacted by our  to schedule their appointments with the transplant team. When appointments are made you will receive a copy of the appointment letter that your patient will receive.     This process may take two to six weeks to complete.     In the event your patient is not a candidate a copy of our transplant programs opinion including reasons will be returned to you to comply with your yearly review regulations. A copy of that letter will also be sent to the patient.     The following information is needed to process a referral:  Medicare form 2728 for all patients on dialysis.  Dental clearance is required if your patient has primary Medicaid.  Current immunization record.  This information can be faxed to [phone].  Current Dietician evaluation can be faxed to [phone].    Thank you again for your trust in our program and the care of your patient.  If there is anything we can do for you or your staff, please feel free to contact us at [phone].    Sincerely,   [facility]/Pancreas Transplant Team  [facility] [place] [address] [phone]

## 2022-09-20 NOTE — TELEPHONE ENCOUNTER
Contacted patient to review initial intake information. Patient reports the followin. Can you walk up a flight of stairs without getting short of breath or stopping? No   2. Can you walk one block without getting short of breath or having to stop? No    3. Do you use oxygen? No   4. Do you use a cane, walker, or wheel chair to assist in mobility? No   5. Have you been hospitalized or had recent surgery in the last 6 months? No    A. Stroke   B. Heart surgery or heart catheterization   C. Broken bone  6. Do you have any cuts, open sores (ulcers), or wounds anywhere on your body? No   7. Do you go to dialysis? Yes  What days? M,W,F  8. Preferred appointment day?   9. Caregiver? Wife (Money)    Patient is aware the next steps will include completing records and compliance verification. Patient is aware once provider review and insurance authorization is received we will contact patient to schedule initial visit. Patient is aware that initial visit will begin prior to / at 7 am and will conclude at approximately 3 pm on date of appointment. All questions answered at this time.

## 2022-09-27 ENCOUNTER — TELEPHONE (OUTPATIENT)
Dept: TRANSPLANT | Facility: CLINIC | Age: 57
End: 2022-09-27
Payer: COMMERCIAL

## 2022-09-28 ENCOUNTER — OUTSIDE PLACE OF SERVICE (OUTPATIENT)
Dept: NEPHROLOGY | Facility: CLINIC | Age: 57
End: 2022-09-28
Payer: COMMERCIAL

## 2022-10-03 ENCOUNTER — PATIENT MESSAGE (OUTPATIENT)
Dept: TRANSPLANT | Facility: CLINIC | Age: 57
End: 2022-10-03
Payer: COMMERCIAL

## 2022-10-03 DIAGNOSIS — Z76.82 ORGAN TRANSPLANT CANDIDATE: Primary | ICD-10-CM

## 2022-10-06 ENCOUNTER — OUTSIDE PLACE OF SERVICE (OUTPATIENT)
Dept: NEPHROLOGY | Facility: CLINIC | Age: 57
End: 2022-10-06
Payer: COMMERCIAL

## 2022-10-19 ENCOUNTER — OUTSIDE PLACE OF SERVICE (OUTPATIENT)
Dept: NEPHROLOGY | Facility: CLINIC | Age: 57
End: 2022-10-19
Payer: COMMERCIAL

## 2022-10-20 ENCOUNTER — TELEPHONE (OUTPATIENT)
Dept: TRANSPLANT | Facility: CLINIC | Age: 57
End: 2022-10-20
Payer: COMMERCIAL

## 2022-10-26 ENCOUNTER — LAB VISIT (OUTPATIENT)
Dept: LAB | Facility: HOSPITAL | Age: 57
End: 2022-10-26
Attending: NURSE PRACTITIONER
Payer: COMMERCIAL

## 2022-10-26 DIAGNOSIS — Z00.00 WELL ADULT EXAM: Primary | ICD-10-CM

## 2022-10-26 DIAGNOSIS — E11.9 DM TYPE 2 (DIABETES MELLITUS, TYPE 2): ICD-10-CM

## 2022-10-26 LAB
ALBUMIN SERPL-MCNC: 3.8 GM/DL (ref 3.5–5)
ALBUMIN/GLOB SERPL: 0.9 RATIO (ref 1.1–2)
ALP SERPL-CCNC: 54 UNIT/L (ref 40–150)
ALT SERPL-CCNC: 32 UNIT/L (ref 0–55)
APPEARANCE UR: CLEAR
AST SERPL-CCNC: 44 UNIT/L (ref 5–34)
BACTERIA #/AREA URNS AUTO: NORMAL /HPF
BASOPHILS # BLD AUTO: 0.1 X10(3)/MCL (ref 0–0.2)
BASOPHILS NFR BLD AUTO: 1.1 %
BILIRUB UR QL STRIP.AUTO: NEGATIVE MG/DL
BILIRUBIN DIRECT+TOT PNL SERPL-MCNC: 0.4 MG/DL
BUN SERPL-MCNC: 57.1 MG/DL (ref 8.4–25.7)
CALCIUM SERPL-MCNC: 9.5 MG/DL (ref 8.4–10.2)
CHLORIDE SERPL-SCNC: 103 MMOL/L (ref 98–107)
CHOLEST SERPL-MCNC: 161 MG/DL
CHOLEST/HDLC SERPL: 6 {RATIO} (ref 0–5)
CO2 SERPL-SCNC: 24 MMOL/L (ref 22–29)
COLOR UR AUTO: ABNORMAL
CREAT SERPL-MCNC: 4.36 MG/DL (ref 0.73–1.18)
EOSINOPHIL # BLD AUTO: 0.36 X10(3)/MCL (ref 0–0.9)
EOSINOPHIL NFR BLD AUTO: 4 %
ERYTHROCYTE [DISTWIDTH] IN BLOOD BY AUTOMATED COUNT: 15.4 % (ref 11.5–17)
EST. AVERAGE GLUCOSE BLD GHB EST-MCNC: 134.1 MG/DL
GFR SERPLBLD CREATININE-BSD FMLA CKD-EPI: 15 MLS/MIN/1.73/M2
GLOBULIN SER-MCNC: 4.2 GM/DL (ref 2.4–3.5)
GLUCOSE SERPL-MCNC: 110 MG/DL (ref 74–100)
GLUCOSE UR QL STRIP.AUTO: 100 MG/DL
HBA1C MFR BLD: 6.3 %
HCT VFR BLD AUTO: 39.5 % (ref 42–52)
HDLC SERPL-MCNC: 28 MG/DL (ref 35–60)
HGB BLD-MCNC: 12.8 GM/DL (ref 14–18)
IMM GRANULOCYTES # BLD AUTO: 0.04 X10(3)/MCL (ref 0–0.04)
IMM GRANULOCYTES NFR BLD AUTO: 0.4 %
KETONES UR QL STRIP.AUTO: NEGATIVE MG/DL
LDLC SERPL CALC-MCNC: 65 MG/DL (ref 50–140)
LEUKOCYTE ESTERASE UR QL STRIP.AUTO: NEGATIVE UNIT/L
LYMPHOCYTES # BLD AUTO: 1.46 X10(3)/MCL (ref 0.6–4.6)
LYMPHOCYTES NFR BLD AUTO: 16.1 %
MCH RBC QN AUTO: 27.8 PG (ref 27–31)
MCHC RBC AUTO-ENTMCNC: 32.4 MG/DL (ref 33–36)
MCV RBC AUTO: 85.9 FL (ref 80–94)
MONOCYTES # BLD AUTO: 0.7 X10(3)/MCL (ref 0.1–1.3)
MONOCYTES NFR BLD AUTO: 7.7 %
NEUTROPHILS # BLD AUTO: 6.4 X10(3)/MCL (ref 2.1–9.2)
NEUTROPHILS NFR BLD AUTO: 70.7 %
NITRITE UR QL STRIP.AUTO: NEGATIVE
NRBC BLD AUTO-RTO: 0 %
PH UR STRIP.AUTO: 7 [PH]
PLATELET # BLD AUTO: 266 X10(3)/MCL (ref 130–400)
PMV BLD AUTO: 9.9 FL (ref 7.4–10.4)
POTASSIUM SERPL-SCNC: 4.4 MMOL/L (ref 3.5–5.1)
PROT SERPL-MCNC: 8 GM/DL (ref 6.4–8.3)
PROT UR QL STRIP.AUTO: >=300 MG/DL
RBC # BLD AUTO: 4.6 X10(6)/MCL (ref 4.7–6.1)
RBC #/AREA URNS AUTO: NORMAL /HPF
RBC UR QL AUTO: ABNORMAL UNIT/L
SODIUM SERPL-SCNC: 139 MMOL/L (ref 136–145)
SP GR UR STRIP.AUTO: 1.02
SQUAMOUS #/AREA URNS AUTO: NORMAL /HPF
TRIGL SERPL-MCNC: 338 MG/DL (ref 34–140)
UROBILINOGEN UR STRIP-ACNC: 0.2 MG/DL
VLDLC SERPL CALC-MCNC: 68 MG/DL
WBC # SPEC AUTO: 9.1 X10(3)/MCL (ref 4.5–11.5)
WBC #/AREA URNS AUTO: NORMAL /HPF

## 2022-10-26 PROCEDURE — 83036 HEMOGLOBIN GLYCOSYLATED A1C: CPT | Mod: NTX

## 2022-10-26 PROCEDURE — 36415 COLL VENOUS BLD VENIPUNCTURE: CPT | Mod: TXP

## 2022-10-26 PROCEDURE — 81001 URINALYSIS AUTO W/SCOPE: CPT | Mod: TXP

## 2022-10-26 PROCEDURE — 85025 COMPLETE CBC W/AUTO DIFF WBC: CPT | Mod: TXP

## 2022-10-26 PROCEDURE — 81003 URINALYSIS AUTO W/O SCOPE: CPT | Mod: TXP

## 2022-10-26 PROCEDURE — 80061 LIPID PANEL: CPT | Mod: NTX

## 2022-10-26 PROCEDURE — 80053 COMPREHEN METABOLIC PANEL: CPT | Mod: TXP

## 2022-10-31 ENCOUNTER — OUTSIDE PLACE OF SERVICE (OUTPATIENT)
Dept: NEPHROLOGY | Facility: CLINIC | Age: 57
End: 2022-10-31
Payer: COMMERCIAL

## 2022-11-01 PROCEDURE — 90966 PR ESRD SERVICES, HOME DIALYSIS, PER MONTH, 20+ YR OLD: ICD-10-PCS | Mod: ,,, | Performed by: INTERNAL MEDICINE

## 2022-11-01 PROCEDURE — 90966 ESRD HOME PT SERV P MO 20+: CPT | Mod: ,,, | Performed by: INTERNAL MEDICINE

## 2022-11-02 ENCOUNTER — TELEPHONE (OUTPATIENT)
Dept: TRANSPLANT | Facility: CLINIC | Age: 57
End: 2022-11-02
Payer: COMMERCIAL

## 2022-11-02 NOTE — LETTER
Date: 11/2/2022          Referral Process      To: Dialysis Unit  and Charge RN From: [facility] and      Kidney Transplant Nurse Coordinators    RE: Nicholas Vera, 1965, [id number]     At [facility], we conduct adherence checks as an important part of transplant care. Initial and listed patient assessments are not complete without adherence information.        Please complete the following information:                 Data from the last 3 months:  (data from last 3 months preferred):    Number of AMAs with dates, time, and reasons: ____________________________________________________    ______________________________________________________________________________________________    ______________________________________________________________________________________________    Number of No-Shows with dates and reasons: ______________________________________________________      ______________________________________________________________________________________________      Any concerns with Caregivers:  YES / NO    If yes, please explain:  ___________________________________________________________________________    ______________________________________________________________________________________________     Any concerns with Transportation:  YES / NO    If yes, please explain:  ___________________________________________________________________________    ______________________________________________________________________________________________    Any Psychiatric and/or Psychosocial concerns:  YES / NO     If yes, please explain: ___________________________________________________________________________    ______________________________________________________________________________________________      PLEASE RETURN TO: FAX: [phone]     Thank you for collaborating with us in the care of this patient.            [address]  ?  [place] [address]  ?  phone [phone]  ?  fax [phone]  ?  [email or web address]  Confidentiality notice: The accompanying facsimile is intended solely for the use of the recipient designated above. Document(s) transmitted herewith may contain information that is confidential and privileged. Delivery, distribution or dissemination of this communication other than to the intended recipient is strictly prohibited. If you have received this facsimile in error, please notify us immediately by telephone.

## 2022-11-08 ENCOUNTER — HOSPITAL ENCOUNTER (OUTPATIENT)
Dept: RADIOLOGY | Facility: HOSPITAL | Age: 57
Discharge: HOME | End: 2022-11-08
Attending: NURSE PRACTITIONER
Payer: COMMERCIAL

## 2022-11-08 ENCOUNTER — OFFICE VISIT (OUTPATIENT)
Dept: TRANSPLANT | Facility: CLINIC | Age: 57
End: 2022-11-08
Payer: COMMERCIAL

## 2022-11-08 ENCOUNTER — TELEPHONE (OUTPATIENT)
Dept: TRANSPLANT | Facility: CLINIC | Age: 57
End: 2022-11-08
Payer: COMMERCIAL

## 2022-11-08 VITALS
BODY MASS INDEX: 32.48 KG/M2 | DIASTOLIC BLOOD PRESSURE: 75 MMHG | WEIGHT: 214.31 LBS | HEART RATE: 90 BPM | RESPIRATION RATE: 18 BRPM | SYSTOLIC BLOOD PRESSURE: 140 MMHG | HEIGHT: 68 IN | TEMPERATURE: 98 F | OXYGEN SATURATION: 97 %

## 2022-11-08 DIAGNOSIS — I25.10 CORONARY ARTERY DISEASE, UNSPECIFIED VESSEL OR LESION TYPE, UNSPECIFIED WHETHER ANGINA PRESENT, UNSPECIFIED WHETHER NATIVE OR TRANSPLANTED HEART: ICD-10-CM

## 2022-11-08 DIAGNOSIS — Z79.4 TYPE 2 DIABETES MELLITUS WITH DIABETIC NEPHROPATHY, WITH LONG-TERM CURRENT USE OF INSULIN: ICD-10-CM

## 2022-11-08 DIAGNOSIS — Z87.891 FORMER SMOKER: ICD-10-CM

## 2022-11-08 DIAGNOSIS — Z76.82 ORGAN TRANSPLANT CANDIDATE: ICD-10-CM

## 2022-11-08 DIAGNOSIS — N18.6 ESRD ON HEMODIALYSIS: ICD-10-CM

## 2022-11-08 DIAGNOSIS — E11.21 TYPE 2 DIABETES MELLITUS WITH DIABETIC NEPHROPATHY, WITH LONG-TERM CURRENT USE OF INSULIN: ICD-10-CM

## 2022-11-08 DIAGNOSIS — Z01.818 PRE-TRANSPLANT EVALUATION FOR KIDNEY TRANSPLANT: Primary | ICD-10-CM

## 2022-11-08 DIAGNOSIS — Z95.5 H/O RIGHT CORONARY ARTERY STENT PLACEMENT: ICD-10-CM

## 2022-11-08 DIAGNOSIS — Z99.2 ESRD ON HEMODIALYSIS: ICD-10-CM

## 2022-11-08 PROBLEM — E11.9 TYPE 2 DIABETES MELLITUS: Status: ACTIVE | Noted: 2017-01-03

## 2022-11-08 PROBLEM — G45.9 TRANSIENT ISCHEMIC ATTACK: Status: ACTIVE | Noted: 2022-11-08

## 2022-11-08 PROCEDURE — 99205 OFFICE O/P NEW HI 60 MIN: CPT | Mod: S$GLB,TXP,, | Performed by: NURSE PRACTITIONER

## 2022-11-08 PROCEDURE — 72170 X-RAY EXAM OF PELVIS: CPT | Mod: TC,TXP

## 2022-11-08 PROCEDURE — 99999 PR PBB SHADOW E&M-EST. PATIENT-LVL IV: CPT | Mod: PBBFAC,TXP,, | Performed by: NURSE PRACTITIONER

## 2022-11-08 PROCEDURE — 99214 OFFICE O/P EST MOD 30 MIN: CPT | Mod: S$GLB,TXP,, | Performed by: TRANSPLANT SURGERY

## 2022-11-08 PROCEDURE — 99214 PR OFFICE/OUTPT VISIT, EST, LEVL IV, 30-39 MIN: ICD-10-PCS | Mod: S$GLB,TXP,, | Performed by: TRANSPLANT SURGERY

## 2022-11-08 PROCEDURE — 76770 US RETROPERITONEAL COMPLETE: ICD-10-PCS | Mod: 26,TXP,, | Performed by: RADIOLOGY

## 2022-11-08 PROCEDURE — 93978 VASCULAR STUDY: CPT | Mod: TC,TXP

## 2022-11-08 PROCEDURE — 93978 VASCULAR STUDY: CPT | Mod: 26,TXP,, | Performed by: RADIOLOGY

## 2022-11-08 PROCEDURE — 99205 PR OFFICE/OUTPT VISIT, NEW, LEVL V, 60-74 MIN: ICD-10-PCS | Mod: S$GLB,TXP,, | Performed by: NURSE PRACTITIONER

## 2022-11-08 PROCEDURE — 76770 US EXAM ABDO BACK WALL COMP: CPT | Mod: 26,TXP,, | Performed by: RADIOLOGY

## 2022-11-08 PROCEDURE — 76770 US EXAM ABDO BACK WALL COMP: CPT | Mod: TC,TXP

## 2022-11-08 PROCEDURE — 72170 XR PELVIS ROUTINE AP: ICD-10-PCS | Mod: 26,TXP,, | Performed by: RADIOLOGY

## 2022-11-08 PROCEDURE — 93978 US DOPP ILIACS BILATERAL: ICD-10-PCS | Mod: 26,TXP,, | Performed by: RADIOLOGY

## 2022-11-08 PROCEDURE — 99999 PR PBB SHADOW E&M-EST. PATIENT-LVL IV: ICD-10-PCS | Mod: PBBFAC,TXP,, | Performed by: NURSE PRACTITIONER

## 2022-11-08 PROCEDURE — 72170 X-RAY EXAM OF PELVIS: CPT | Mod: 26,TXP,, | Performed by: RADIOLOGY

## 2022-11-08 RX ORDER — ISOSORBIDE MONONITRATE 30 MG/1
30 TABLET, EXTENDED RELEASE ORAL DAILY
COMMUNITY

## 2022-11-08 RX ORDER — PEN NEEDLE, DIABETIC 31 GX5/16"
NEEDLE, DISPOSABLE MISCELLANEOUS
COMMUNITY
Start: 2022-10-25

## 2022-11-08 RX ORDER — ERGOCALCIFEROL 1.25 MG/1
50000 CAPSULE ORAL WEEKLY
COMMUNITY
Start: 2022-10-06 | End: 2023-07-24

## 2022-11-08 NOTE — PROGRESS NOTES
INITIAL PATIENT EDUCATION NOTE    Mr. Nicholas Vera was seen in pre-kidney transplant clinic for evaluation for kidney, kidney/pancreas or pancreas only transplant.  The patient attended a an individual video education session that discussed/reviewed the following aspects of transplantation: evaluation and selection committee process, UNOS waitlist management/multiple listings, types of organs offered (KDPI < 85%, KDPI > 85%, PHS risk, DCD, HCV+, HIV+ for HIV+ recipients and enbloc/dual), financial aspects, surgical procedures, dietary instruction pre- and post-transplant, health maintenance pre- and post-transplant, post-transplant hospitalization and outpatient follow-up, potential to participate in a research protocol, and medication management and side effects.  A question and answer session was provided after the presentation.    The patient was seen by all members of the multi-disciplinary team to include: Nephrologist/PA, Surgeon, , Transplant Coordinator, , Pharmacist and Dietician (if applicable).    The patient reviewed and signed all consents for evaluation which were witnessed and sent to scanning into the EPIC chart.    The patient was given an education book and plan for further evaluation based on his individual assessment.      Reviewed program requirement for complete COVID vaccination with documentation prior to listing.  COVID education information reviewed with patient. Patient encouraged to be up to date on all vaccinations.       The patient was informed that the transplant team would manage immediate post op pain. If the patient requires long term pain management, they will need to have that pain management addressed by their PCP or previous provider who wrote for long term pain medicines.    The patient was encouraged to call with any questions or concerns.

## 2022-11-08 NOTE — PROGRESS NOTES
"PHARM.D. PRE-TRANSPLANT NOTE:    This patient's medication therapy was evaluated as part of his pre-transplant evaluation.      The following general pharmacologic concerns were noted: Plavix will increase periop bleeding risk - on it due to previous TIA and heart stent; Resume patient's Lexapro;     The following concerns for post-operative pain management were noted: N/A    The following pharmacologic concerns related to HCV therapy were noted: Max dose of rosuvastatin with DAA therapy is 10 mg/day.       This patient's medication profile was reviewed for considerations for DAA Hepatitis C therapy:    [x]  No current inducers of CYP 3A4 or PGP  [x]  No amiodarone on this patient's EMR profile in the last 24 months  [x]  No past or current atrial fibrillation on this patient's EMR profile       Current Outpatient Medications   Medication Sig Dispense Refill    albuterol (PROVENTIL/VENTOLIN HFA) 90 mcg/actuation inhaler Inhale 2 puffs into the lungs every 4 (four) hours as needed.      ANORO ELLIPTA 62.5-25 mcg/actuation DsDv Inhale 1 puff into the lungs once daily.      ascorbic acid, vitamin C, (VITAMIN C) 500 MG tablet Take 500 mg by mouth once daily.      BASAGLAR KWIKPEN U-100 INSULIN glargine 100 units/mL (3mL) SubQ pen Inject 25 Units into the skin every evening.      BD ULTRA-FINE MINI PEN NEEDLE 31 gauge x 3/16" Ndle       blood sugar diagnostic, drum (ACCU-CHEK COMPACT PLUS TEST) Strp Accu-Chek Compact Plus Test Strips      calcitRIOL (ROCALTROL) 0.5 MCG Cap Take 1 capsule (0.5 mcg total) by mouth once daily. 30 capsule 1    cholecalciferol, vitamin D3, 125 mcg (5,000 unit) Tab Take 5,000 Units by mouth once daily.      clopidogreL (PLAVIX) 75 mg tablet Take 75 mg by mouth once daily at 6am.      ergocalciferol (ERGOCALCIFEROL) 50,000 unit Cap Take 50,000 Units by mouth once a week. Mondays      EScitalopram oxalate (LEXAPRO) 10 MG tablet Take 10 mg by mouth once daily at 6am.      fenofibrate (TRICOR) 145 " MG tablet Take 145 mg by mouth once daily at 6am.      ferrous sulfate 324 mg (65 mg iron) TbEC Take 1 tablet (324 mg total) by mouth once daily. 30 tablet     glimepiride (AMARYL) 4 MG tablet Take 4 mg by mouth 2 (two) times a day.      isosorbide mononitrate (IMDUR) 30 MG 24 hr tablet Take 30 mg by mouth once daily.      labetaloL (NORMODYNE) 200 MG tablet Take 100 mg by mouth 2 (two) times a day.      multivit-mins/iron/folic/lycop (CENTRUM MEN ORAL) Take 1 tablet by mouth once daily at 6am.      NIFEdipine (PROCARDIA-XL) 30 MG (OSM) 24 hr tablet Take 30 mg by mouth once daily.      pantoprazole (PROTONIX) 40 MG tablet Take 40 mg by mouth once daily at 6am.      pulse oximeter (PULSE OXIMETER) device Use twice daily at 8 AM and 3 PM and record the value in MyChart as directed.    **Please Mail to patient** 1 each 0    rosuvastatin (CRESTOR) 20 MG tablet Take 20 mg by mouth once daily.      terazosin (HYTRIN) 2 MG capsule Take 2 mg by mouth 2 (two) times a day.      TRULICITY 1.5 mg/0.5 mL pen injector once a week. Q wednesday      VASCEPA 1 gram Cap Take 2 tablets by mouth once daily.      vitamin renal formula, B-complex-vitamin c-folic acid, (NEPHROCAP) 1 mg Cap Take 1 capsule by mouth once daily. 90 capsule 0    XYOSTED 100 mg/0.5 mL AtIn Inject into the skin once a week. Q wednesday      cloNIDine (CATAPRES) 0.2 MG tablet Take 1 tablet (0.2 mg total) by mouth 3 (three) times daily as needed (SBP > 160). (Patient not taking: Reported on 11/8/2022) 90 tablet 1    nitroGLYCERIN (NITROSTAT) 0.4 MG SL tablet as needed.      sodium bicarbonate 650 MG tablet Take 2 tablets (1,300 mg total) by mouth 2 (two) times daily. (Patient not taking: Reported on 11/8/2022) 180 tablet 3     No current facility-administered medications for this visit.           I am available for consultation and can be contacted, as needed by the other members of the Transplant team.

## 2022-11-08 NOTE — LETTER
November 15, 2022        Guido Lozada  [address] [facility] [address]  Phone: [phone]  Fax: [phone]             [facility] D[facility]on- [facility]  [address]  [facility] [address]  Phone: [phone]   Patient: Nicholas Vera   MR Number: [id number]   YOB: 1965   Date of Visit: 11/8/2022       Dear Dr. Guido Lozada    Thank you for referring Nicholas Vera to me for evaluation. Attached you will find relevant portions of my assessment and plan of care.    If you have questions, please do not hesitate to call me. I look forward to following Nicholas Vera along with you.    Sincerely,    Selina Bird NP    Enclosure    If you would like to receive this communication electronically, please contact [email or web address] or [phone] to request [employer] access.    [employer] is a tool which provides read-only access to select patient information with whom you have a relationship. Its easy to use and provides real time access to review your patients record including encounter summaries, notes, results, and demographic information.    If you feel you have received this communication in error or would no longer like to receive these types of communications, please e-mail [email or web address]

## 2022-11-08 NOTE — TELEPHONE ENCOUNTER
Reviewed pt transplant labs.  Notified dialysis unit dietitian of the following abnormal labs via fax and requested their most recent nutrition note on this pt.  Once this note is received it will be scanned into pt's chart.    A1c 6.9  Phos 5.1

## 2022-11-08 NOTE — PROGRESS NOTES
Transplant Surgery  Kidney Transplant Recipient Evaluation    Referring Physician: Guido Lozada  Current Nephrologist: Guido Lozada    Subjective:     Reason for Visit: evaluate transplant candidacy    History of Present Illness: Nicholas Vera is a 56 y.o. year old male undergoing transplant evaluation.    Dialysis History: Nicholas is on hemodialysis.      Transplant History: N/A    Etiology of Renal Disease: Diabetes Mellitus - Type II (based on medical records from referral).    External provider notes reviewed: Yes    Review of Systems   Constitutional:  Negative for activity change, appetite change, chills, diaphoresis, fatigue, fever and unexpected weight change.   HENT:  Negative for congestion, dental problem, ear pain, facial swelling, mouth sores, nosebleeds, sore throat, tinnitus, trouble swallowing and voice change.    Eyes:  Negative for photophobia, pain and visual disturbance.   Respiratory:  Negative for apnea, cough, choking, chest tightness and shortness of breath.    Cardiovascular:  Negative for chest pain, palpitations and leg swelling.   Gastrointestinal:  Negative for abdominal distention, abdominal pain, blood in stool, constipation, diarrhea, nausea and vomiting.   Endocrine: Negative for cold intolerance and heat intolerance.   Genitourinary:  Negative for difficulty urinating, dysuria, flank pain, hematuria and urgency.   Musculoskeletal:  Negative for arthralgias and gait problem.   Skin:  Negative for color change, pallor and rash.   Neurological:  Negative for dizziness, tremors, seizures, syncope and light-headedness.   Hematological:  Negative for adenopathy. Does not bruise/bleed easily.   Psychiatric/Behavioral:  Negative for agitation and confusion.    Objective:     Physical Exam:  Constitutional:   Vitals reviewed: yes   Well-nourished and well-groomed: yes  Eyes:   Sclerae icteric: no   Extraocular movements intact: yes  GI:    Bowel sounds normal: yes   Tenderness: no    If  yes, quadrant/location: not applicable   Palpable masses: no    If yes, quadrant/location: not applicable   Hepatosplenomegaly: no   Ascites: no   Hernia: no    If yes, type/location: not applicable   Surgical scars: yes    If yes, type/location: left inguinal hernia, LUQ PD catheter  Resp:   Effort normal: yes   Breath sounds normal: yes    CV:   Regular rate and rhythm: yes   Heart sounds normal: yes   Femoral pulses normal: yes   Extremities edematous: no  Skin:   Rashes or lesions present: no    If yes, describe:not applicable   Jaundice:: no    Musculoskeletal:   Gait normal: yes   Strength normal: yes  Psych:   Oriented to person, place, and time: yes   Affect and mood normal: yes    Additional comments: not applicable    Diagnostics:  The following labs have been reviewed: CBC  CMP  PT  INR  PTT  Abo  Pth  A1c  Lipid panel  The following radiology images have been independently reviewed and interpreted: CT Chest    Counseling: We provided Nicholas Murray Jody with a group education session today.  We discussed kidney transplantation at length with him, including risks, potential complications, and alternatives in the management of his renal failure.  The discussion included complications related to anesthesia, bleeding, infection, primary nonfunction, and ATN.  I discussed the typical postoperative course, length of hospitalization, the need for long-term immunosuppression, and the need for long-term routine follow-up.  I discussed living-donor and -donor transplantation and the relative advantages and disadvantages of each.  I also discussed average waiting times for both living donation and  donation.  I discussed national and center-specific survival rates.  I also mentioned the potential benefit of multicenter listing to candidates listed with centers within more than one organ procurement organization.  All questions were answered.    Patient advised that it is recommended that all  transplant candidates, and their close contacts and household members receive Covid vaccination.    Final determination of transplant candidacy will be made once evaluation is complete and reviewed by the Kidney & Kidney/Pancreas Selection Committee.    Coronavirus disease (COVID-19) caused by severe acute respiratory virus coronavirus 2 (SARS-C0V 2) is associated with increased mortality in solid organ transplant recipients (SOT) compared to non-transplant patients. Vaccine responses to vaccination are depressed against SARS-CoV2 compared to normal individuals but improve with third vaccination doses. Vaccination prior to SOT provides both the best opportunity for transplant candidates to develop protective immunity and to reduce the risk of serious COVID19 infections post transplantation. Organ transplant candidates at [facility] will be required to receive SARS-CoV-2 vaccination prior to being listed with a an active status, whenever possible. Exceptions will be made for disability related reasons or for sincerely held [employer] beliefs.          Transplant Surgery - Candidacy   Assessment/Plan:   Nicholas Vera has end stage renal disease (ESRD) on dialysis. I have concerns with CVA on plavix, CAD s/p stent and self reported need for a new stent. Based on available information, Nicholas Vera is a high-risk kidney transplant candidate. Would need to obtain CV records and assessment after upcoming angio/stent.    Additional testing to be completed according to the Written Order Guidelines for Adult Pre-kidney and Pancreas Transplant Evaluation (KI-02).  Interpretation of tests and discussion of patient management involves all members of the multidisciplinary transplant team.    Lenora Murray MD

## 2022-11-08 NOTE — PROGRESS NOTES
Transplant Nephrology  Kidney Transplant Recipient Evaluation    Referring Physician: Guido Lozada  Current Nephrologist: Guido Lozada    Subjective:   CC:  Initial evaluation of kidney transplant candidacy.    HPI:  Mr. Vera is a 56 y.o. year old White male who has presented to be evaluated as a potential kidney transplant recipient.  He has ESRD secondary to presumed diabetic nephropathy, has never had a renal biopsy. Patient is currently on hemodialysis started on 9/19/2022. Patient is dialyzing on MWF schedule.  Patient reports that he is tolerating dialysis well. He has a LUE AV fistula and PD for dialysis access, planning for transition to PD in the future.    CKD with rapid progression to ESRD following COVID infection. Presumed diabetic nephropathy. Has been diabetic since age 15. Denies retinopathy, neuropathy, or gastroparesis. On insulin, A1c today 6.9.    CAD-had RCA stent in 2018 following abnormal stress test. Had recent echo and stress test in preparation for transplant evaluation. Stress test was abnormal, cardiologist planning for LHC next week.    Also with PVD and history of TIA. No residual deficits. On plavix.    Former heavy smoker, 2 PPD for 35 years. Quit 7 years ago.    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 Remains active, no CP or SOB with exertion. Looks good, not frail.    Has several potential living donors.    Had initial colonoscopy with poor prep, then 2nd colonoscopy. Had third colonoscopy for bleeding after 2nd. In total had 16 polyps removed, repeat due in 1 year.    Current Outpatient Medications   Medication Sig Dispense Refill    albuterol (PROVENTIL/VENTOLIN HFA) 90 mcg/actuation inhaler Inhale 2 puffs into the lungs every 4 (four) hours as needed.      ANORO ELLIPTA 62.5-25 mcg/actuation DsDv Inhale 1 puff into the lungs once daily.      ascorbic acid, vitamin C, (VITAMIN C) 500 MG tablet Take 500 mg by mouth once daily.      STACIE BLOUNT  "U-100 INSULIN glargine 100 units/mL (3mL) SubQ pen Inject 25 Units into the skin every evening.      BD ULTRA-FINE MINI PEN NEEDLE 31 gauge x 3/16" Ndle       blood sugar diagnostic, drum (ACCU-CHEK COMPACT PLUS TEST) Strp Accu-Chek Compact Plus Test Strips      calcitRIOL (ROCALTROL) 0.5 MCG Cap Take 1 capsule (0.5 mcg total) by mouth once daily. 30 capsule 1    cholecalciferol, vitamin D3, 125 mcg (5,000 unit) Tab Take 5,000 Units by mouth once daily.      clopidogreL (PLAVIX) 75 mg tablet Take 75 mg by mouth once daily at 6am.      ergocalciferol (ERGOCALCIFEROL) 50,000 unit Cap Take 50,000 Units by mouth once a week. Mondays      EScitalopram oxalate (LEXAPRO) 10 MG tablet Take 10 mg by mouth once daily at 6am.      fenofibrate (TRICOR) 145 MG tablet Take 145 mg by mouth once daily at 6am.      ferrous sulfate 324 mg (65 mg iron) TbEC Take 1 tablet (324 mg total) by mouth once daily. 30 tablet     glimepiride (AMARYL) 4 MG tablet Take 4 mg by mouth 2 (two) times a day.      isosorbide mononitrate (IMDUR) 30 MG 24 hr tablet Take 30 mg by mouth once daily.      labetaloL (NORMODYNE) 200 MG tablet Take 100 mg by mouth 2 (two) times a day.      multivit-mins/iron/folic/lycop (CENTRUM MEN ORAL) Take 1 tablet by mouth once daily at 6am.      NIFEdipine (PROCARDIA-XL) 30 MG (OSM) 24 hr tablet Take 30 mg by mouth once daily.      pantoprazole (PROTONIX) 40 MG tablet Take 40 mg by mouth once daily at 6am.      pulse oximeter (PULSE OXIMETER) device Use twice daily at 8 AM and 3 PM and [facility].    **Please Mail to patient** 1 each 0    rosuvastatin (CRESTOR) 20 MG tablet Take 20 mg by mouth once daily.      terazosin (HYTRIN) 2 MG capsule Take 2 mg by mouth 2 (two) times a day.      TRULICITY 1.5 mg/0.5 mL pen injector once a week. Q wednesday      VASCEPA 1 gram Cap Take 2 tablets by mouth once daily.      vitamin renal formula, B-complex-vitamin c-folic acid, (NEPHROCAP) 1 mg Cap Take 1 " capsule by mouth once daily. 90 capsule 0    XYOSTED 100 mg/0.5 mL AtIn Inject into the skin once a week. Q wednesday      cloNIDine (CATAPRES) 0.2 MG tablet Take 1 tablet (0.2 mg total) by mouth 3 (three) times daily as needed (SBP > 160). (Patient not taking: Reported on 11/8/2022) 90 tablet 1    nitroGLYCERIN (NITROSTAT) 0.4 MG SL tablet as needed.      sodium bicarbonate 650 MG tablet Take 2 tablets (1,300 mg total) by mouth 2 (two) times daily. (Patient not taking: Reported on 11/8/2022) 180 tablet 3     No current facility-administered medications for this visit.       Past Medical History:   Diagnosis Date    Anemia     Anxiety     CAD (coronary artery disease)     CKD (chronic kidney disease) stage 4, GFR 15-29 ml/min     COPD (chronic obstructive pulmonary disease)     DDD (degenerative disc disease), cervical and lumbar s/p surgery     GERD (gastroesophageal reflux disease)     HTN (hypertension)     Hyperlipidemia     SHAYLEE on CPAP     PSVT (paroxysmal supraventricular tachycardia)     TIA (transient ischemic attack)     Type 2 diabetes mellitus        Past Medical and Surgical History: Mr. Vera  has a past medical history of Anemia, Anxiety, CAD (coronary artery disease), CKD (chronic kidney disease) stage 4, GFR 15-29 ml/min, COPD (chronic obstructive pulmonary disease), DDD (degenerative disc disease), cervical and lumbar s/p surgery, GERD (gastroesophageal reflux disease), HTN (hypertension), Hyperlipidemia, SHAYLEE on CPAP, PSVT (paroxysmal supraventricular tachycardia), TIA (transient ischemic attack), and Type 2 diabetes mellitus.  He has a past surgical history that includes Anterior cervical discectomy w/ fusion (11/26/2021); Coronary stent placement (2018); Microdiscectomy of spine (04/17/2015); Carpal tunnel release (Bilateral); TONSILLECTOMY, ADENOIDECTOMY; Sinus endoscopy; Nasal septoplasty; Elbow surgery (Bilateral); Colonoscopy; Myringotomy w/ tubes; Functional endoscopic sinus surgery (FESS);  "Inguinal hernia repair; Vasectomy; and AV fistula placement.    Past Social and Family History: Mr. Vera reports that he has quit smoking. His smoking use included cigarettes. He has a 74.00 pack-year smoking history. He has never used smokeless tobacco. He reports that he does not drink alcohol and does not use drugs. His family history includes Multiple sclerosis in his daughter.    Review of Systems   Constitutional:  Positive for fatigue. Negative for activity change, appetite change and fever.   HENT:  Negative for congestion, mouth sores and sore throat.    Eyes:  Negative for visual disturbance.   Respiratory:  Negative for cough, chest tightness and shortness of breath.    Cardiovascular:  Negative for chest pain, palpitations and leg swelling.   Gastrointestinal:  Negative for abdominal distention, abdominal pain, constipation, diarrhea and nausea.   Genitourinary:  Negative for difficulty urinating, frequency and hematuria.   Musculoskeletal:  Negative for arthralgias and gait problem.   Skin:  Negative for wound.   Allergic/Immunologic: Negative for environmental allergies, food allergies and immunocompromised state.   Neurological:  Negative for dizziness, weakness and numbness.   Hematological:  Bruises/bleeds easily.        On plavix   Psychiatric/Behavioral:  Negative for sleep disturbance. The patient is not nervous/anxious.      Objective:   Blood pressure (!) 140/75, pulse 90, temperature 97.7 °F (36.5 °C), temperature source Temporal, resp. rate 18, height 5' 8.03" (1.728 m), weight 97.2 kg (214 lb 4.6 oz), SpO2 97 %.body mass index is 32.55 kg/m².    Physical Exam  Vitals and nursing note reviewed.   Constitutional:       Appearance: Normal appearance.   HENT:      Head: Normocephalic.   Cardiovascular:      Rate and Rhythm: Normal rate and regular rhythm.      Heart sounds: Normal heart sounds.   Pulmonary:      Effort: Pulmonary effort is normal.      Breath sounds: Normal breath sounds. "   Abdominal:      General: Bowel sounds are normal. There is no distension.      Palpations: Abdomen is soft.      Tenderness: There is no abdominal tenderness.      Comments: PD catheter , no erythema, edema, tenderness or drainage.    Musculoskeletal:         General: Normal range of motion.      Comments: LUE AV fistula + thrill    Skin:     General: Skin is warm and dry.   Neurological:      General: No focal deficit present.      Mental Status: He is alert.   Psychiatric:         Behavior: Behavior normal.       Labs:  Lab Results   Component Value Date    WBC 9.24 11/08/2022    HGB 13.1 (L) 11/08/2022    HCT 39.4 (L) 11/08/2022     11/08/2022    K 3.9 11/08/2022    CL 98 11/08/2022    CO2 26 11/08/2022    BUN 39 (H) 11/08/2022    CREATININE 5.3 (H) 11/08/2022    EGFRNONAA 15 07/13/2022    EGFRIFAFRICA 23 01/23/2022    GLUCOSE 110 (H) 10/26/2022    CALCIUM 9.4 11/08/2022    PHOS 5.1 (H) 11/08/2022    MG 2.20 09/16/2022    ALBUMIN 4.1 11/08/2022    AST 38 11/08/2022    ALT 36 11/08/2022    .7 (H) 11/08/2022       Lab Results   Component Value Date    BILIRUBINUA Negative 10/26/2022    PROTEINUA >=300 (A) 10/26/2022    NITRITE Negative 09/03/2021    RBCUA 0-2 10/26/2022    WBCUA None Seen 10/26/2022     Labs were reviewed with the patient.    Assessment:     1. Pre-transplant evaluation for kidney transplant    2. ESRD on hemodialysis    3. Type 2 diabetes mellitus with diabetic nephropathy, with long-term current use of insulin    4. Coronary artery disease, unspecified vessel or lesion type, unspecified whether angina present, unspecified whether native or transplanted heart    5. H/O right coronary artery stent placement    6. Former smoker    7. BMI 32.0-32.9,adult      Plan:   56 y.o. year old White male who has presented to be evaluated as a potential kidney transplant recipient.  He has ESRD secondary to presumed diabetic nephropathy, has never had a renal biopsy. Patient is currently on  hemodialysis started on 9/19/2022. History of CAD, is scheduled for LHC next week for recent abnormal stress test. Will need cardiology clearance following LHC.      Transplant Candidacy:   Based on available information, Mr. Vera is a high-risk kidney transplant candidate due to CAD, PVD.   Meets center eligibility for accepting HCV+ donor offer - yes.  Patient educated on HCV+ donors. Nicholas is willing to accept HCV+ donor offer - yes   Patient is a candidate for KDPI > 85 kidney donor offer - no (weight > 88kg)  Final determination of transplant candidacy will be made once workup is complete and reviewed by the selection committee.    Patient advised that it is recommended that all transplant candidates, and their close contacts and household members receive Covid vaccination.    Selina Bird, RUDDY       OS Patient Status  Functional Status: 90% - Able to carry on normal activity: minor symptoms of disease  Physical Capacity: No Limitations

## 2022-11-08 NOTE — PROGRESS NOTES
"Transplant Recipient Adult Psychosocial Assessment    Nicholas Vera  [address] [place] [address]  Telephone Information:   Mobile [phone]   Home  [phone] (home)  Work  There is no work phone number on file.  E-mail  [email or web address]    Sex: male  YOB: 1965  Age: 56 y.o.    Encounter Date: 2022  [place] Citizen: yes  Primary Language: English   Needed: no    Emergency Contact:  Name: Karen Christian  Relationship: wife  Address: Same as pt  Phone Numbers:  ([phone]) (mobile)    Family/Social Support:   Number of dependents/: Pt denies  Marital history: Pt reports 2 marriages total. Current marriage to wife, Karen Christian, in .   Other family dynamics: Pt reports pt's mother  when pt was 10. Pt reports pt's father is alive and has no relationship with father. Pt reports 1 brother living in [place], [place] who is interested in being a living donor. Pt reports another half brother may be interested in being a living donor. Pt has 2 adult children: 35 yo son Nicholas Vera lives in [place] and works FT (unable to be backup caregiver due to "dramatic wife"- per pt's wife) and 32 yo daughter Carmel Vera who has MS and unable to be caregiver. Pt reports close relationship with pt's wife's family.     Household Composition:  Name: Karen Anahi; retired counselor   Age: 58   Relationship: wife  Does person drive? yes    Do you and your caregivers have access to reliable transportation? yes  PRIMARY CAREGIVER: Karen Christian, pt's wife, will be primary caregiver, phone number ([phone])     Able to take time off work without financial concerns: yes. Pt's wife retired mental health counselor.     Additional Significant Others who will Assist with Transplant:  Pt unable to provide backup caregiver at this time. Pt agreed to speak with members of pt's wife's family and agrees to follow-up with SW once backup caregiver plan has been established.     Living " Will: no .  Healthcare Power of : yes, Pt reports trusting wife, Karen Christian, to make medical decisions on his behalf.   Advance Directives on file: <<no information> per medical record.  Verbally reviewed LW/HCPA information.   provided patient with copy of LW/HCPA documents and provided education on completion of forms    Living Donors: No. Education and resource information given to patient.    Highest Education Level: Attended College/Technical School  Reading Ability: college  Reports difficulty with: seeing and hearing. Pt wears readers as needed. Pt reports having surgery in his left ear earlier in  and has 85% hearing in left ear.   Learns Best By:  Hands-on     Status: no  VA Benefits: no     Working for Income: yes  If yes, working activity level: Pt reports currently on short-term disability at [employer]. Pt plans to use long-term disability benefits if needed while waiting for transplant. Pt reports can use long-term disability until pt reaches [facility].   Spouse/Significant Other Employment: Pt's wife is retired mental health counselor.     Disabled: no    Monthly Income:  Salary/Wages: $11,000 gross    Insurance:   Payer/Plan Subscr  Sex Relation Sub. Ins. ID Effective Group Num   1. AETNA - AETNA* ERMELINDAIRIS JAVED* 1965 Male Self [id number] 22 [id number]                                   [address]   2. AETNA - AETNA* ERMELINDAIRIS JAVED* 1965 Male Self [id number]                                   [address]     Primary Insurance (for UNOS reporting): Private Insurance- through pt's employer  Secondary Insurance (for UNOS reporting): None    Pt reports plans to enroll in Medicare A, B, and D once eligible (pt began dialysis on 2022).     Dialysis Adherence: Patient reports  Pt reports is highly compliant with all medical appointments and instructions .Dialysis adherence form faxed to [facility] and [facility] awaits reply.      Infusion Service: patient utilizing? no  Home Health: patient utilizing? no  DME: yes, CPAP  Pulmonary/Cardiac Rehab: Pt reports having stent placed in heart.    ADLS:  Pt reports independent with all ADLS and driving.     Adherence:    Pt reports is highly compliant with all medical appointments and instructions. .  Adherence education and counseling provided.     Per History Section:  Past Medical History:   Diagnosis Date    Anemia     Anxiety     CAD (coronary artery disease)     CKD (chronic kidney disease) stage 4, GFR 15-29 ml/min     COPD (chronic obstructive pulmonary disease)     DDD (degenerative disc disease), cervical and lumbar s/p surgery     GERD (gastroesophageal reflux disease)     HTN (hypertension)     Hyperlipidemia     SHAYLEE on CPAP     PSVT (paroxysmal supraventricular tachycardia)     TIA (transient ischemic attack)     Type 2 diabetes mellitus      Social History     Tobacco Use    Smoking status: Former     Packs/day: 2.00     Years: 37.00     Pack years: 74.00     Types: Cigarettes    Smokeless tobacco: Never    Tobacco comments:     Quit smoking cigarettes 6 years ago.  Vaped for 3 years.  Stopped completely 3 years ago   Substance Use Topics    Alcohol use: Never     Social History     Substance and Sexual Activity   Drug Use Never     Social History     Substance and Sexual Activity   Sexual Activity Not Currently       Per Today's Psychosocial:  Tobacco: Pt reports quit smoking cigarettes in 2016 and quit vaping in 2019. Pt denies current nicotine or tobacco use.   Alcohol: none, patient denies any use.  Illicit Drugs/Non-prescribed Medications: Pt reports distant social marijuana use as a teenager and distant occasional cocaine use over 30 years ago. Pt reports never needing treatment. Pt denies current usage.       Arrests/DWI/Treatment/Rehab: yes, Pt reports 7 total arrests with 6 arrests in 1 year (1990) for disturbing the peace as pt was going through divorce from 1st wife. Pt  "reports never being charged for any offenses nor ever serving any time in assisted or prison.     Psychiatric History:    Mental Health: Pt reports  no overwhelming anxiety or depression. Pt reports still adjusting to dialysis and no longer working. SW strongly encouraged pt to explore hobbies to help pt fill time when pt moves to PD, pt agreed.   Psychiatrist/Counselor: Pt denies. Pt reports open to meeting with Psychiatry if transplant team recommends.   Medications:  Pt reports PCP prescribed Lexapro  approx. 2 years ago due to pt feeling "edgy" and "enriquez". Pt reports anxiety now under control with medication.   Suicide/Homicide Issues: Pt denies ever having SI or HI.      Knowledge: Patient states having clear understanding and realistic expectations regarding the potential risks and potential benefits of organ transplantation and organ donation and agrees to discuss with health care team members and support system members, as well as to utilize available resources and express questions and/or concerns in order to further facilitate the pt informed decision-making.  Resources and information provided and reviewed.     Patient is aware of [facility]'s affiliation and/or partnership with agencies in home health care, LTAC, SNF, [facility], and other hospitals and clinics.    Understanding: Patient reports having a clear understanding of the many lifetime commitments involved with being a transplant recipient, including costs, compliance, medications, lab work, procedures, appointments, concrete and financial planning, preparedness, timely and appropriate communication of concerns, abstinence (ETOH, tobacco, illicit non-prescribed drugs), adherence to all health care team recommendations, support system and caregiver involvement, appropriate and timely resource utilization and follow-through, mental health counseling as needed/recommended, and patient and caregiver responsibilities.  Social Service Handbook, resources and " detailed educational information provided and reviewed.  Educational information provided.    Patient also reports current and expected compliance with health care regime, and patient states having a clear understanding of the importance of compliance.  Patient reports a clear understanding that risks and benefits may be involved with organ transplantation and with organ donation.  Patient also reports clear understanding that psychosocial risk factors may affect patient, and include but are not limited to feelings of depression, generalized anxiety, anxiety regarding dependence on others, post traumatic stress disorder, feelings of guilt and other emotional and/or mental concerns, and/or exacerbation of existing mental health concerns.  Detailed resources provided and discussed.  Patient agrees to access appropriate resources in a timely manner as needed and/or as recommended, and to communicate concerns appropriately.  Patient also reports a clear understanding of treatment options available.      Patient and caregiver received education in a group setting.   reviewed education, provided additional information, and answered questions.    Feelings or Concerns: Pt and pt's wife deny current concerns.     Coping: Identify Patient & Caregiver Strategies to [place]:   1. Currently & Pre-transplant - Golf, grandkids, Grilling, Swimming, Voodoo, haley   2. At the time of surgery - Haley, family   3. During post-Transplant & Recovery Period - Denis, Mandaeism, haley    Goals: Pt reports goal of getting off dialysis and traveling.  Patient referred to Vocational Rehabilitation.    Interview Behavior: Patient and caregiver present as alert and oriented x 4, pleasant, good eye contact, well groomed, recall good, concentration/judgement good, average intelligence, calm, communicative, cooperative, and asking and answering questions appropriately.  Pt's supportive wife present for duration of interview with pt's  permission.           Transplant Social Work - Candidacy  Assessment/Plan:     Psychosocial Suitability: Based on psychosocial risk factors, patient presents as Low risk for kidney transplant due to established caregiver plan, supportive family system, no reported history of substance use, adequate insurance coverage and personal finances to cover transplant costs, and realistic beliefs and expectations regarding risks and benefits of transplant.     Recommendations/Additional Comments: SW recommends pt establish local lodging plan for immediate posttransplant recovery period. Sw recommends pt conduct fundraising to assist pt with pay for cost of medications, food, gas, and other transplant related needs. SW recommends pt remain aware of potential mental health concerns and contact team if any concerns arise. SW recommends pt remain abstinent from tobacco, ETOH, and substance use. SW supports pt's continued adherence. SW remains available to answer any questions or concerns that arise as pt moves through transplant process.     Jeanie Conner LMSW

## 2022-11-16 ENCOUNTER — OUTSIDE PLACE OF SERVICE (OUTPATIENT)
Dept: NEPHROLOGY | Facility: CLINIC | Age: 57
End: 2022-11-16
Payer: COMMERCIAL

## 2022-11-29 ENCOUNTER — OFFICE VISIT (OUTPATIENT)
Dept: NEUROSURGERY | Facility: CLINIC | Age: 57
End: 2022-11-29
Payer: COMMERCIAL

## 2022-11-29 ENCOUNTER — HOSPITAL ENCOUNTER (OUTPATIENT)
Dept: RADIOLOGY | Facility: HOSPITAL | Age: 57
Discharge: HOME | End: 2022-11-29
Attending: PHYSICIAN ASSISTANT
Payer: COMMERCIAL

## 2022-11-29 VITALS
SYSTOLIC BLOOD PRESSURE: 125 MMHG | HEART RATE: 69 BPM | DIASTOLIC BLOOD PRESSURE: 58 MMHG | HEIGHT: 68 IN | BODY MASS INDEX: 34.71 KG/M2 | WEIGHT: 229 LBS | RESPIRATION RATE: 17 BRPM

## 2022-11-29 DIAGNOSIS — G56.03 BILATERAL CARPAL TUNNEL SYNDROME: ICD-10-CM

## 2022-11-29 DIAGNOSIS — M47.12 CERVICAL SPONDYLOSIS WITH MYELOPATHY: ICD-10-CM

## 2022-11-29 DIAGNOSIS — S12.9XXD PSEUDOARTHROSIS OF CERVICAL SPINE, SUBSEQUENT ENCOUNTER: ICD-10-CM

## 2022-11-29 DIAGNOSIS — M47.12 CERVICAL SPONDYLOSIS WITH MYELOPATHY: Primary | ICD-10-CM

## 2022-11-29 PROBLEM — S12.9XXA PSEUDOARTHROSIS OF CERVICAL SPINE: Status: ACTIVE | Noted: 2022-11-29

## 2022-11-29 PROCEDURE — 72052 X-RAY EXAM NECK SPINE 6/>VWS: CPT | Mod: TC,TXP

## 2022-11-29 PROCEDURE — 99214 PR OFFICE/OUTPT VISIT, EST, LEVL IV, 30-39 MIN: ICD-10-PCS | Mod: TXP,,, | Performed by: PHYSICIAN ASSISTANT

## 2022-11-29 PROCEDURE — 99214 OFFICE O/P EST MOD 30 MIN: CPT | Mod: TXP,,, | Performed by: PHYSICIAN ASSISTANT

## 2022-11-29 RX ORDER — TICAGRELOR 90 MG/1
90 TABLET ORAL 2 TIMES DAILY
COMMUNITY
Start: 2022-11-18 | End: 2023-07-24

## 2022-11-29 RX ORDER — TORSEMIDE 20 MG/1
40 TABLET ORAL DAILY
COMMUNITY

## 2022-11-29 RX ORDER — SEVELAMER CARBONATE 800 MG/1
800 TABLET, FILM COATED ORAL
COMMUNITY
End: 2023-07-24

## 2022-11-29 NOTE — PROGRESS NOTES
[facility]  History & Physical  Neurosurgery      Nicholas Vera   [id number]   1965       CHIEF COMPLAINT:  Hand pain and numbness    HPI:  Nicholas Vera is a 56 y.o. male who presents for 1 year postoperative appointment.  On 11/26/2021, the patient underwent C5-6 and C6-7 ACDF with Dr. Augie Jett.  Overall, he is doing well.  He does not have neck pain.  He has seen a resolution of the arm pain he was experiencing prior to surgery.  Although improved, he continues with some numbness and pain in his hands, palm side and all fingers.  He awakens at night with pain.  His history is significant for having undergone bilateral carpal tunnel release and left ulnar nerve decompression with Dr. Cuello on 12/28/2020.  He has also had surgery on the right elbow.      Past Medical History:   Diagnosis Date    Anemia     Anxiety     CAD (coronary artery disease)     CKD (chronic kidney disease) stage 4, GFR 15-29 ml/min     COPD (chronic obstructive pulmonary disease)     DDD (degenerative disc disease), cervical and lumbar s/p surgery     GERD (gastroesophageal reflux disease)     HTN (hypertension)     Hyperlipidemia     SHAYLEE on CPAP     PSVT (paroxysmal supraventricular tachycardia)     TIA (transient ischemic attack)     Type 2 diabetes mellitus        Past Surgical History:   Procedure Laterality Date    ANTERIOR CERVICAL DISCECTOMY W/ FUSION  11/26/2021    C5-6, C6-7 ACDF- Dr. Jett    AV FISTULA PLACEMENT      CARPAL TUNNEL RELEASE Bilateral     bilateral CTR & left UND- 2004 & 2020    COLONOSCOPY      CORONARY STENT PLACEMENT  2018    RCA    ELBOW SURGERY Bilateral     FUNCTIONAL ENDOSCOPIC SINUS SURGERY (FESS)      INGUINAL HERNIA REPAIR      INSERTION, STENT, ARTERY  11/2022    MICRODISCECTOMY OF SPINE  04/17/2015    L4-5. L5-S1 decompression; left L4-5 microdiscectomy- Dr. Amaro    MYRINGOTOMY W/ TUBES      NASAL SEPTOPLASTY      SINUS ENDOSCOPY      TONSILLECTOMY, ADENOIDECTOMY    "   VASECTOMY         Family History   Problem Relation Age of Onset    Multiple sclerosis Daughter        Social History     Socioeconomic History    Marital status:    Tobacco Use    Smoking status: Former     Packs/day: 2.00     Years: 37.00     Pack years: 74.00     Types: Cigarettes    Smokeless tobacco: Never    Tobacco comments:     Quit smoking cigarettes 6 years ago.  Vaped for 3 years.  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    ANORO ELLIPTA 62.5-25 MCG/ACTUATION DSDV    Inhale 1 puff into the lungs once daily.    ASCORBIC ACID, VITAMIN C, (VITAMIN C) 500 MG TABLET    Take 500 mg by mouth once daily.    BASAGLAR KWIKPEN U-100 INSULIN GLARGINE 100 UNITS/ML (3ML) SUBQ PEN    Inject 25 Units into the skin every evening.    BD ULTRA-FINE MINI PEN NEEDLE 31 GAUGE X 3/16" NDLE        BLOOD SUGAR DIAGNOSTIC, DRUM (ACCU-CHEK COMPACT PLUS TEST) STRP    Accu-Chek Compact Plus Test Strips    BRILINTA 90 MG TABLET    Take 90 mg by mouth 2 (two) times a day.    CALCITRIOL (ROCALTROL) 0.5 MCG CAP    Take 1 capsule (0.5 mcg total) by mouth once daily.    CHOLECALCIFEROL, VITAMIN D3, 125 MCG (5,000 UNIT) TAB    Take 5,000 Units by mouth once daily.    CLONIDINE (CATAPRES) 0.2 MG TABLET    Take 1 tablet (0.2 mg total) by mouth 3 (three) times daily as needed (SBP > 160).    CLOPIDOGREL (PLAVIX) 75 MG TABLET    Take 75 mg by mouth once daily at 6am.    ERGOCALCIFEROL (ERGOCALCIFEROL) 50,000 UNIT CAP    Take 50,000 Units by mouth once a week. Mondays    ESCITALOPRAM OXALATE (LEXAPRO) 10 MG " TABLET    Take 10 mg by mouth once daily at 6am.    FENOFIBRATE (TRICOR) 145 MG TABLET    Take 145 mg by mouth once daily at 6am.    FERROUS SULFATE 324 MG (65 MG IRON) TBEC    Take 1 tablet (324 mg total) by mouth once daily.    GLIMEPIRIDE (AMARYL) 4 MG TABLET    Take 4 mg by mouth 2 (two) times a day.    ISOSORBIDE MONONITRATE (IMDUR) 30 MG 24 HR TABLET    Take 30 mg by mouth once daily.    LABETALOL (NORMODYNE) 200 MG TABLET    Take 100 mg by mouth 2 (two) times a day.    MULTIVIT-MINS/IRON/FOLIC/LYCOP (CENTRUM MEN ORAL)    Take 1 tablet by mouth once daily at 6am.    NIFEDIPINE (PROCARDIA-XL) 30 MG (OSM) 24 HR TABLET    Take 30 mg by mouth once daily.    NITROGLYCERIN (NITROSTAT) 0.4 MG SL TABLET    as needed.    PANTOPRAZOLE (PROTONIX) 40 MG TABLET    Take 40 mg by mouth once daily at 6am.    PULSE OXIMETER (PULSE OXIMETER) DEVICE    Use twice daily at 8 AM and 3 PM and record the value in [facility] as directed.    **Please Mail to patient**    ROSUVASTATIN (CRESTOR) 20 MG TABLET    Take 20 mg by mouth once daily.    SEVELAMER CARBONATE (RENVELA) 800 MG TAB    Take 800 mg by mouth.    SODIUM BICARBONATE 650 MG TABLET    Take 2 tablets (1,300 mg total) by mouth 2 (two) times daily.    TERAZOSIN (HYTRIN) 2 MG CAPSULE    Take 2 mg by mouth 2 (two) times a day.    TORSEMIDE (DEMADEX) 20 MG TAB    Take 40 mg by mouth once daily.    TRULICITY 1.5 MG/0.5 ML PEN INJECTOR    once a week. Q wednesday    VASCEPA 1 GRAM CAP    Take 2 tablets by mouth once daily.    VITAMIN RENAL FORMULA, B-COMPLEX-VITAMIN C-FOLIC ACID, (NEPHROCAP) 1 MG CAP    Take 1 capsule by mouth once daily.    XYOSTED 100 MG/0.5 ML ATIN    Inject into the skin once a week. Q wednesday        ROS:    Review of Systems   Constitutional:  Negative for chills and fever.   HENT:  Negative for nosebleeds and sore throat.    Eyes:  Negative for pain and visual disturbance.   Respiratory:  Negative for cough, chest tightness and shortness of breath.   "  Cardiovascular:  Negative for chest pain.   Gastrointestinal:  Negative for diarrhea, nausea and vomiting.   Genitourinary:  Negative for difficulty urinating, dysuria and hematuria.   Musculoskeletal:  Negative for gait problem, myalgias and neck pain.   Skin:  Negative for rash.   Neurological:  Positive for numbness. Negative for dizziness, facial asymmetry and headaches.   Psychiatric/Behavioral:  Negative for confusion and sleep disturbance. The patient is not nervous/anxious.        Physical Examination:    Vital Signs:  BP (!) 125/58 (BP Location: Other (Comment), Patient Position: Sitting)   Pulse 69   Resp 17   Ht 5' 8" (1.727 m)   Wt 103.9 kg (229 lb)   BMI 34.82 kg/m²      General:  Pleasant. Well-groomed. Overweight.    Lungs:  Breathing is quiet, non-labored     Musculoskeletal:  Tinel's is negative at bilateral wrist and elbows.  Phalen test is positive bilaterally at 13-15 seconds.    Neurological:    Oriented to Person, Place, Time   He is moving all extremities well.  Gait is normal.      Imaging:  We will x-rays were obtained on 11/29/2022.  There is stable position of the hardware.  There appears to be pseudoarthrosis at both C5-6 and C6-7.  However, there is no motion with flexion-extension at C5-6.  There is motion with flexion and extension at C6-7.      ASSESSMENT/PLAN:     1. Cervical spondylosis with myelopathy        2. Bilateral carpal tunnel syndrome  Ambulatory referral/consult to Neurology    Ambulatory referral/consult to Orthopedics      3. Pseudoarthrosis of cervical spine, subsequent encounter  X-Ray Cervical Spine 5 View W Flex Extxt        Options were discussed at length with the patient.  Recently, his bone growth stimulator stopped working.  He was reducing the stimulator in a regular basis prior to that time.  He does not have complaints of neck pain or radiculopathy.  He does continue to have pain and numbness in his hands, along the palm side.  We obtain electrical " studies to further assess.  We will also refer the patient to Dr. Costello.  He will return to our office for follow-up via telemedicine after the electrical studies are obtained.  He will return at 18 months postop with cervical x-rays.      A total of 30 minutes was spent face-to-face with the patient during this encounter.  Over half of that time was spent on counseling and coordination of care.  Additional time was used to review the patient's chart, cervical x-rays comparing with old, and work on office note.      Ritu Aburto PA-C

## 2022-12-01 ENCOUNTER — TELEPHONE (OUTPATIENT)
Dept: TRANSPLANT | Facility: CLINIC | Age: 57
End: 2022-12-01
Payer: COMMERCIAL

## 2022-12-01 PROCEDURE — 90962 ESRD SERV 1 VISIT P MO 20+: CPT | Mod: ,,, | Performed by: INTERNAL MEDICINE

## 2022-12-01 PROCEDURE — 90962 PR ESRD SERVICES, PER MONTH, 20+ YR OLD, 1 VISIT: ICD-10-PCS | Mod: ,,, | Performed by: INTERNAL MEDICINE

## 2022-12-01 NOTE — TELEPHONE ENCOUNTER
Returned phone call to patient. Informed his angiogram was received. Denies other questions or concerns at this time.     ----- Message from Cristina Loya RN sent at 12/1/2022 11:58 AM CST -----  Regarding: FW: Status of fax  Contact: [phone]    ----- Message -----  From: Maynor Eli MA  Sent: 12/1/2022  11:22 AM CST  To: [facility]  Subject: Status of fax                                    Patient is calling to check the status of a fax he sent over. Please call and advise.

## 2022-12-06 ENCOUNTER — TELEPHONE (OUTPATIENT)
Dept: NEUROSURGERY | Facility: CLINIC | Age: 57
End: 2022-12-06
Payer: COMMERCIAL

## 2022-12-06 NOTE — TELEPHONE ENCOUNTER
You saw this patient recently where you requested BUE EMG and referral to Dr. Costello.  You wanted to do a telemed visit after the EMG as well.  The patient is scheduled with Dr. Costello on 12/14/22 and EMG with Dr. Mujica in March.  Do you still need to see the patient via telemed?  Please advise...BH

## 2022-12-06 NOTE — TELEPHONE ENCOUNTER
Yes, I need to see him because I ordered the electrical studies.  Will need to discuss the results with him.  Perhaps you can see if Tarah can do the electrical studies sooner than March.

## 2022-12-07 NOTE — TELEPHONE ENCOUNTER
I spoke to the patient.  He would like to be referred to Dr. Rascon to have his EMG sooner.  He is aware that he is scheduling in January.  I will schedule a telemed follow up with Ritu once he has the EMG scheduled.  ANGELITO

## 2022-12-14 ENCOUNTER — OFFICE VISIT (OUTPATIENT)
Dept: ORTHOPEDICS | Facility: CLINIC | Age: 57
End: 2022-12-14
Payer: COMMERCIAL

## 2022-12-14 ENCOUNTER — HOSPITAL ENCOUNTER (OUTPATIENT)
Dept: RADIOLOGY | Facility: CLINIC | Age: 57
Discharge: HOME | End: 2022-12-14
Attending: STUDENT IN AN ORGANIZED HEALTH CARE EDUCATION/TRAINING PROGRAM
Payer: COMMERCIAL

## 2022-12-14 DIAGNOSIS — G56.03 BILATERAL CARPAL TUNNEL SYNDROME: Primary | ICD-10-CM

## 2022-12-14 DIAGNOSIS — G56.03 BILATERAL CARPAL TUNNEL SYNDROME: ICD-10-CM

## 2022-12-14 PROCEDURE — 73130 X-RAY EXAM OF HAND: CPT | Mod: LT,NTX,, | Performed by: STUDENT IN AN ORGANIZED HEALTH CARE EDUCATION/TRAINING PROGRAM

## 2022-12-14 PROCEDURE — 99203 PR OFFICE/OUTPT VISIT, NEW, LEVL III, 30-44 MIN: ICD-10-PCS | Mod: TXP,,, | Performed by: STUDENT IN AN ORGANIZED HEALTH CARE EDUCATION/TRAINING PROGRAM

## 2022-12-14 PROCEDURE — 99203 OFFICE O/P NEW LOW 30 MIN: CPT | Mod: TXP,,, | Performed by: STUDENT IN AN ORGANIZED HEALTH CARE EDUCATION/TRAINING PROGRAM

## 2022-12-14 PROCEDURE — 73130 X-RAY EXAM OF HAND: CPT | Mod: RT,NTX,, | Performed by: STUDENT IN AN ORGANIZED HEALTH CARE EDUCATION/TRAINING PROGRAM

## 2022-12-14 PROCEDURE — 73130 XR HAND COMPLETE 3 VIEW LEFT: ICD-10-PCS | Mod: LT,NTX,, | Performed by: STUDENT IN AN ORGANIZED HEALTH CARE EDUCATION/TRAINING PROGRAM

## 2022-12-14 NOTE — PROGRESS NOTES
Chief Complaint:  Bilateral hand numbness and tingling    Consulting Physician: Ritu Aburto PA-C    History of present illness:    Patient is a 56-year-old male who presents with a complicated history of bilateral hand numbness, pain, tingling.  He has had multiple surgeries to address this including a 2 level cervical spine fusion, a cubital tunnel release on each side, 2 carpal tunnel releases on each side.  He states that his 1st carpal tunnel release was performed in 2001.  He had both of them released in 2001.  During this initial surgery he had complete resolution of all of his symptoms.  The symptoms slowly returned over the next 15 years.  He then had both of the carpal tunnels released 2 years ago.  When he got the released 2 years ago he did not get any relief.  He complains of numbness in all 5 fingers of both hands.  He does also have some neck pain.  He had a ulnar nerve transposition on the right side and in situ decompression on the left side.  He does not complain of much neck pain radiating into the shoulders.  He states that after his spine surgery his cervical spine complains with pain radiating into his bilateral shoulders resolved.  He feels that most of his issues are coming from the elbow and distal.  He is scheduled for an EMG next month.    Past Medical History:   Diagnosis Date    Anemia     Anxiety     CAD (coronary artery disease)     CKD (chronic kidney disease) stage 4, GFR 15-29 ml/min     COPD (chronic obstructive pulmonary disease)     DDD (degenerative disc disease), cervical and lumbar s/p surgery     GERD (gastroesophageal reflux disease)     HTN (hypertension)     Hyperlipidemia     SHAYLEE on CPAP     PSVT (paroxysmal supraventricular tachycardia)     TIA (transient ischemic attack)     Type 2 diabetes mellitus        Past Surgical History:   Procedure Laterality Date    ANTERIOR CERVICAL DISCECTOMY W/ FUSION  11/26/2021    C5-6, C6-7 ACDF- Dr. Jett    AV FISTULA PLACEMENT    "   CARPAL TUNNEL RELEASE Bilateral     bilateral CTR & left UND- 2004 & 2020    COLONOSCOPY      CORONARY STENT PLACEMENT  2018    RCA    ELBOW SURGERY Bilateral     FUNCTIONAL ENDOSCOPIC SINUS SURGERY (FESS)      INGUINAL HERNIA REPAIR      INSERTION, STENT, ARTERY  11/2022    MICRODISCECTOMY OF SPINE  04/17/2015    L4-5. L5-S1 decompression; left L4-5 microdiscectomy- Dr. Amaro    MYRINGOTOMY W/ TUBES      NASAL SEPTOPLASTY      SINUS ENDOSCOPY      TONSILLECTOMY, ADENOIDECTOMY      VASECTOMY         Current Outpatient Medications   Medication Sig    albuterol (PROVENTIL/VENTOLIN HFA) 90 mcg/actuation inhaler Inhale 2 puffs into the lungs every 4 (four) hours as needed.    ascorbic acid, vitamin C, (VITAMIN C) 500 MG tablet Take 500 mg by mouth once daily.    BASAGLAR KWIKPEN U-100 INSULIN glargine 100 units/mL (3mL) SubQ pen Inject 25 Units into the skin every evening.    BD ULTRA-FINE MINI PEN NEEDLE 31 gauge x 3/16" Ndle     blood sugar diagnostic, drum (ACCU-CHEK COMPACT PLUS TEST) Strp Accu-Chek Compact Plus Test Strips    BRILINTA 90 mg tablet Take 90 mg by mouth 2 (two) times a day.    calcitRIOL (ROCALTROL) 0.5 MCG Cap Take 1 capsule (0.5 mcg total) by mouth once daily.    cholecalciferol, vitamin D3, 125 mcg (5,000 unit) Tab Take 5,000 Units by mouth once daily.    ergocalciferol (ERGOCALCIFEROL) 50,000 unit Cap Take 50,000 Units by mouth once a week. Mondays    EScitalopram oxalate (LEXAPRO) 10 MG tablet Take 10 mg by mouth once daily at 6am.    fenofibrate (TRICOR) 145 MG tablet Take 145 mg by mouth once daily at 6am.    glimepiride (AMARYL) 4 MG tablet Take 4 mg by mouth 2 (two) times a day.    isosorbide mononitrate (IMDUR) 30 MG 24 hr tablet Take 30 mg by mouth once daily.    labetaloL (NORMODYNE) 200 MG tablet Take 100 mg by mouth 2 (two) times a day.    multivit-mins/iron/folic/lycop (CENTRUM MEN ORAL) Take 1 tablet by mouth once daily at 6am.    NIFEdipine (PROCARDIA-XL) 30 MG (OSM) 24 hr tablet " Take 30 mg by mouth once daily.    pantoprazole (PROTONIX) 40 MG tablet Take 40 mg by mouth once daily at 6am.    pulse oximeter (PULSE OXIMETER) device Use twice daily at 8 AM and 3 PM and record the value in MyChart as directed.    **Please Mail to patient**    rosuvastatin (CRESTOR) 20 MG tablet Take 20 mg by mouth once daily.    sevelamer carbonate (RENVELA) 800 mg Tab Take 800 mg by mouth.    sodium bicarbonate 650 MG tablet Take 2 tablets (1,300 mg total) by mouth 2 (two) times daily.    terazosin (HYTRIN) 2 MG capsule Take 2 mg by mouth 2 (two) times a day.    torsemide (DEMADEX) 20 MG Tab Take 40 mg by mouth once daily.    TRULICITY 1.5 mg/0.5 mL pen injector once a week. Q wednesday    VASCEPA 1 gram Cap Take 2 tablets by mouth once daily.    XYOSTED 100 mg/0.5 mL AtIn Inject into the skin once a week. Q wednesday    ANORO ELLIPTA 62.5-25 mcg/actuation DsDv Inhale 1 puff into the lungs once daily.    cloNIDine (CATAPRES) 0.2 MG tablet Take 1 tablet (0.2 mg total) by mouth 3 (three) times daily as needed (SBP > 160). (Patient not taking: Reported on 12/14/2022)    clopidogreL (PLAVIX) 75 mg tablet Take 75 mg by mouth once daily at 6am.    ferrous sulfate 324 mg (65 mg iron) TbEC Take 1 tablet (324 mg total) by mouth once daily. (Patient not taking: Reported on 12/14/2022)    nitroGLYCERIN (NITROSTAT) 0.4 MG SL tablet as needed.    vitamin renal formula, B-complex-vitamin c-folic acid, (NEPHROCAP) 1 mg Cap Take 1 capsule by mouth once daily. (Patient not taking: Reported on 12/14/2022)     No current facility-administered medications for this visit.       Review of patient's allergies indicates:  No Known Allergies    Family History   Problem Relation Age of Onset    Multiple sclerosis Daughter        Social History     Socioeconomic History    Marital status:    Tobacco Use    Smoking status: Former     Packs/day: 2.00     Years: 37.00     Pack years: 74.00     Types: Cigarettes    Smokeless tobacco:  Never    Tobacco comments:     Quit smoking cigarettes 6 years ago.  Vaped for 3 years.  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  HENT:   Denies headaches or blurry vision.  Cardiovascular:  Denies chest pain or irregular heart beat.  Respiratory:   Denies cough or shortness of breath.  Gastrointestinal:  Denies abdominal pain, nausea, or vomiting.  Musculoskeletal:   Denies muscle cramps.  Neurological:   Denies dizziness or focal weakness.  Psychiatric/Behavior: Normal mental status.  Hematology/Lymph:  Denies bleeding problem or easy bruising/bleeding.  Skin:    Denies rash or suspicious lesions.    Examination:    Vital Signs:    Vitals:    12/14/22 0914   PainSc:   5       There is no height or weight on file to calculate BMI.    Constitution:   Well-developed, well nourished patient in no acute distress.  Neurological:   Alert and oriented x 3 and cooperative to examination.     Psychiatric/Behavior: Normal mental status.  Respiratory:   No shortness of breath.  Eyes:    Extraoccular muscles intact  Skin:    No scars, rash or suspicious lesions.    MSK:   Cervical spine:  Incision over the anterior aspect of cervical spine is healing well.  Limited range of motion of the cervical spine.  Spurling is negative  Bilateral upper extremities:  Incisions over the medial aspect of the elbow over the ulnar nerve the cubital tunnel are healed without any sign of infection.  There is no to now over the right or left elbow.  There is a positive Tinel over the median nerve at the carpal tunnel bilaterally.  He has full range of motion of the elbow wrist and hand.  He has diminished sensation to all 5 digits.  Two-point discrimination is 12 mm in all 5 digits.  Apb  strength is 5/5.  There is no numbness in the palmar cutaneous branch distribution.  And intrinsic strength in 1st dorsal interosseous strength is 5/5.  Radial pulse 2 +his hand is warm well perfused    Imaging:   XR right hand shows no fracture  XR left hand shows no fracture     Assessment:   Recalcitrant bilateral carpal tunnel syndrome  Recalcitrant left cubital tunnel syndrome  Cervical radiculopathy    Plan:  He has nerve compression at multiple sites. I would like to get a EMG and NCS to better evaluate the sites of compression that he has. I will see him back after the EMG    Follow Up: After EMG  Xray at next visit: None

## 2022-12-15 ENCOUNTER — TELEPHONE (OUTPATIENT)
Dept: TRANSPLANT | Facility: CLINIC | Age: 57
End: 2022-12-15
Payer: COMMERCIAL

## 2022-12-16 ENCOUNTER — COMMITTEE REVIEW (OUTPATIENT)
Dept: TRANSPLANT | Facility: CLINIC | Age: 57
End: 2022-12-16
Payer: COMMERCIAL

## 2022-12-16 NOTE — LETTER
December 16, 2022    Nicholas Vera  [place] [id number]    Dear Nicholas Jody:  MRN: [id number]    It is the duty of the [facility] Selection Committee to determine which patients are candidates for a transplant. For this reason, our committee has the difficult task of evaluating patients to determine which ones have the greatest chance of having a successful transplant. We are aware of the magnitude of this responsibility, and we approach it with reverence and humility.    It is with regret I inform you that you are not approved as a transplant candidate due to  cardiovascular disease .  You had an angiogram completed 11/18/22 with stent placement, requiring dual antiplatelet therapy for one year. You may be re-referred after one year with cardiac clearance. Based on this review, we have determined that at this time, you are not a candidate for a transplant at [facility].      The selection committee carefully considers each patient's transplant candidacy and determines whether it is safe to proceed with transplantation on a case-by-case basis using established selection criteria.  At present, the risk of proceeding with an elective transplant surgery has become too high.                                                                               Although the selection committee believes you are not a suitable transplant candidate, you have the option to be evaluated at other transplant centers who may have different selection criteria.  You may request your [facility] records be sent to any center of your choice by contacting our Medical Records Department at [phone].                                                                               Attached is a letter from the [employer] for Organ Sharing (UNOS).  It describes the services and information offered to patients by UNOS and the Organ Procurement and Transplant Network.    The [facility] sincerely  wishes you the best and remains available to answer any questions.  Please do not hesitate to contact our pre-transplant office if we can assist you in any other way.                                                                               Sincerely,      Eileen Moore MD  Medical Director, Kidney & Kidney/Pancreas Transplantation  jr  Encl: UNOS Letter  Cc: Guido Lozada MD         [facility]               The Organ Procurement and Transplantation Network   Toll-free patient services line: Your resource for organ transplant information     If you have a question regarding your own medical care, you always should call your transplant hospital first. However, for general organ transplant-related information, you can call the Organ Procurement and Transplantation Network (OPTN) toll-free patient services line at [phone].     Anyone, including potential transplant candidates, candidates, recipients, family members, friends, living donors, and donor family members, can call this number to:     Talk about organ donation, living donation, the transplant process, the donation process, and transplant policies.   Get a free patient information kit with helpful booklets, waiting list and transplant information, and a list of all transplant hospitals.   Ask questions about the OPTN website (https://optn.transplant.hrsa.gov/), the [employer] Organ Sharings (UNOS) website (https://unos.org/), or the UNOS website for living donors and transplant recipients. (https://www.transplantliving.org/).   Learn how the OPTN can help you.   Talk about any concerns that you may have with a transplant hospital.     The nations transplant system, the OPTN, is managed under federal contract by the [employer] Organ Sharing (UNOS), which is a non-profit charitable organization. The OPTN helps create and define organ sharing policies that make the best use of donated organs. This process  continuously evaluating new advances and discoveries so policies can be adapted to best serve patients waiting for transplants. To do so, the OPTN works closely with transplant professionals, transplant patients, transplant candidates, donor families, living donors, and the public. All transplant programs and organ procurement organizations throughout the country are OPTN members and are obligated to follow the policies the OPTN creates for allocating organs.     The OPTN also is responsible for:   Providing educational material for patients, the public, and professionals.   Raising awareness of the need for donated organs and tissue.   Coordinating organ procurement, matching, and placement.   Collecting information about every organ transplant and donation that occurs in the [place].     Remember, you should contact your transplant hospital directly if you have questions or concerns about your own medical care including medical records, work-up progress, and test results.     We are not your transplant hospital, and our staff will not be able to answer questions about your case, so please keep your transplant hospitals phone number handy.   However, while you research your transplant needs and learn as much as you can about transplantation and donation, we welcome your call to our toll-free patient services line at [phone].

## 2022-12-16 NOTE — COMMITTEE REVIEW
Native Organ Dx: Diabetes Mellitus - Type II      Not approved for LRD/CAD transplant due to cardiovascular disease.  Angiogram completed 11/18/22 with stent placement requiring dual antiplatelet therapy for one year.     Patient can be re-referred after one year with cardiac clearance.       Note written by HUBER Ortiz RN     ===============================================  I was present for selection committee and attest to the decision stated above.    Misty Scales, DO  Transplant Nephrology

## 2022-12-20 ENCOUNTER — OUTSIDE PLACE OF SERVICE (OUTPATIENT)
Dept: NEPHROLOGY | Facility: CLINIC | Age: 57
End: 2022-12-20
Payer: COMMERCIAL

## 2022-12-27 ENCOUNTER — LAB VISIT (OUTPATIENT)
Dept: LAB | Facility: HOSPITAL | Age: 57
End: 2022-12-27
Attending: INTERNAL MEDICINE
Payer: COMMERCIAL

## 2022-12-27 DIAGNOSIS — R06.09 DYSPNEA ON EXERTION: Primary | ICD-10-CM

## 2022-12-27 DIAGNOSIS — R94.39 ABNORMAL MYOCARDIAL PERFUSION STUDY: ICD-10-CM

## 2022-12-27 LAB
ALBUMIN SERPL-MCNC: 3.8 G/DL (ref 3.5–5)
ALBUMIN/GLOB SERPL: 1.1 RATIO (ref 1.1–2)
ALP SERPL-CCNC: 52 UNIT/L (ref 40–150)
ALT SERPL-CCNC: 40 UNIT/L (ref 0–55)
AST SERPL-CCNC: 37 UNIT/L (ref 5–34)
BILIRUBIN DIRECT+TOT PNL SERPL-MCNC: 0.4 MG/DL
BUN SERPL-MCNC: 58 MG/DL (ref 8.4–25.7)
CALCIUM SERPL-MCNC: 9.5 MG/DL (ref 8.4–10.2)
CHLORIDE SERPL-SCNC: 101 MMOL/L (ref 98–107)
CO2 SERPL-SCNC: 23 MMOL/L (ref 22–29)
CREAT SERPL-MCNC: 6.56 MG/DL (ref 0.73–1.18)
ERYTHROCYTE [DISTWIDTH] IN BLOOD BY AUTOMATED COUNT: 13.2 % (ref 11.6–14.4)
GFR SERPLBLD CREATININE-BSD FMLA CKD-EPI: 9 MLS/MIN/1.73/M2
GLOBULIN SER-MCNC: 3.5 GM/DL (ref 2.4–3.5)
GLUCOSE SERPL-MCNC: 158 MG/DL (ref 74–100)
HCT VFR BLD AUTO: 37.5 % (ref 42–52)
HGB BLD-MCNC: 12 GM/DL (ref 14–18)
INR BLD: 0.99 (ref 0–1.3)
MCH RBC QN AUTO: 28.3 PG
MCHC RBC AUTO-ENTMCNC: 32 MG/DL (ref 33–36)
MCV RBC AUTO: 88.4 FL (ref 80–94)
PLATELET # BLD AUTO: 274 X10(3)/MCL (ref 140–371)
PMV BLD AUTO: 10.3 FL (ref 9.4–12.4)
POTASSIUM SERPL-SCNC: 4.3 MMOL/L (ref 3.5–5.1)
PROT SERPL-MCNC: 7.3 GM/DL (ref 6.4–8.3)
PROTHROMBIN TIME: 13 SECONDS (ref 12.5–14.5)
RBC # BLD AUTO: 4.24 X10(6)/MCL (ref 4.7–6.1)
SODIUM SERPL-SCNC: 139 MMOL/L (ref 136–145)
WBC # SPEC AUTO: 7.8 X10(3)/MCL (ref 4.5–11.5)

## 2022-12-27 PROCEDURE — 85027 COMPLETE CBC AUTOMATED: CPT

## 2022-12-27 PROCEDURE — 36415 COLL VENOUS BLD VENIPUNCTURE: CPT

## 2022-12-27 PROCEDURE — 85610 PROTHROMBIN TIME: CPT

## 2022-12-27 PROCEDURE — 80053 COMPREHEN METABOLIC PANEL: CPT

## 2022-12-29 ENCOUNTER — TELEPHONE (OUTPATIENT)
Dept: TRANSPLANT | Facility: CLINIC | Age: 57
End: 2022-12-29
Payer: COMMERCIAL

## 2022-12-29 NOTE — TELEPHONE ENCOUNTER
Returned phone call to nurse, no answer, will route letters and fax to  center        ----- Message from Yael Woo sent at 12/29/2022 10:13 AM CST -----  Regarding: Documents  A nurse with the [facility] is requesting denial letters for patients Nicholas Vera and Jose Luis Tiff (mrn: [id number]). Please send to fax #: [phone] with attention to Dialysis Nephrologist Dr. Guido Lozada.      Dialysis Center [facility] @ [phone]

## 2023-01-01 PROCEDURE — 90966 PR ESRD SERVICES, HOME DIALYSIS, PER MONTH, 20+ YR OLD: ICD-10-PCS | Mod: ,,, | Performed by: INTERNAL MEDICINE

## 2023-01-01 PROCEDURE — 90966 ESRD HOME PT SERV P MO 20+: CPT | Mod: ,,, | Performed by: INTERNAL MEDICINE

## 2023-01-26 ENCOUNTER — OUTSIDE PLACE OF SERVICE (OUTPATIENT)
Dept: NEPHROLOGY | Facility: CLINIC | Age: 58
End: 2023-01-26
Payer: COMMERCIAL

## 2023-02-01 PROCEDURE — 90966 PR ESRD SERVICES, HOME DIALYSIS, PER MONTH, 20+ YR OLD: ICD-10-PCS | Mod: ,,, | Performed by: INTERNAL MEDICINE

## 2023-02-01 PROCEDURE — 90966 ESRD HOME PT SERV P MO 20+: CPT | Mod: ,,, | Performed by: INTERNAL MEDICINE

## 2023-02-13 PROBLEM — G45.9 TRANSIENT ISCHEMIC ATTACK: Status: RESOLVED | Noted: 2022-11-08 | Resolved: 2023-02-13

## 2023-02-27 ENCOUNTER — OUTSIDE PLACE OF SERVICE (OUTPATIENT)
Dept: NEPHROLOGY | Facility: CLINIC | Age: 58
End: 2023-02-27
Payer: COMMERCIAL

## 2023-03-01 PROCEDURE — 90966 PR ESRD SERVICES, HOME DIALYSIS, PER MONTH, 20+ YR OLD: ICD-10-PCS | Mod: ,,, | Performed by: INTERNAL MEDICINE

## 2023-03-01 PROCEDURE — 90966 ESRD HOME PT SERV P MO 20+: CPT | Mod: ,,, | Performed by: INTERNAL MEDICINE

## 2023-03-15 ENCOUNTER — OFFICE VISIT (OUTPATIENT)
Dept: ORTHOPEDICS | Facility: CLINIC | Age: 58
End: 2023-03-15
Payer: COMMERCIAL

## 2023-03-15 VITALS — HEART RATE: 73 BPM | DIASTOLIC BLOOD PRESSURE: 63 MMHG | SYSTOLIC BLOOD PRESSURE: 143 MMHG

## 2023-03-15 DIAGNOSIS — G62.9 POLYNEUROPATHY: Primary | ICD-10-CM

## 2023-03-15 PROCEDURE — 99213 OFFICE O/P EST LOW 20 MIN: CPT | Mod: ,,, | Performed by: STUDENT IN AN ORGANIZED HEALTH CARE EDUCATION/TRAINING PROGRAM

## 2023-03-15 PROCEDURE — 99213 PR OFFICE/OUTPT VISIT, EST, LEVL III, 20-29 MIN: ICD-10-PCS | Mod: ,,, | Performed by: STUDENT IN AN ORGANIZED HEALTH CARE EDUCATION/TRAINING PROGRAM

## 2023-03-15 RX ORDER — GABAPENTIN 300 MG/1
300 CAPSULE ORAL 3 TIMES DAILY
Qty: 90 CAPSULE | Refills: 1 | Status: SHIPPED | OUTPATIENT
Start: 2023-03-15 | End: 2024-02-15

## 2023-03-15 NOTE — PROGRESS NOTES
Chief Complaint:  Bilateral hand numbness and tingling    Consulting Physician: No ref. provider found    History of present illness:    Patient is a 56-year-old male who presents with a complicated history of bilateral hand numbness, pain, tingling.  He has had multiple surgeries to address this including a 2 level cervical spine fusion, a cubital tunnel release on each side, 2 carpal tunnel releases on each side.  He states that his 1st carpal tunnel release was performed in 2001.  He had both of them released in 2001.  During this initial surgery he had complete resolution of all of his symptoms.  The symptoms slowly returned over the next 15 years.  He then had both of the carpal tunnels released 2 years ago.  When he got the released 2 years ago he did not get any relief.  He complains of numbness in all 5 fingers of both hands.  He does also have some neck pain.  He had a ulnar nerve transposition on the right side and in situ decompression on the left side.  He does not complain of much neck pain radiating into the shoulders.  He states that after his spine surgery his cervical spine complains with pain radiating into his bilateral shoulders resolved.      He presents today for EMG results.  He would a EMG performed about 1 month ago.  His symptoms are about the same.  There were moderate.  He states in the past that he had similar symptoms in his legs which cleared up with gabapentin.  He is inquiring about gabapentin today.  Past Medical History:   Diagnosis Date    Anemia     Anxiety     CAD (coronary artery disease)     CKD (chronic kidney disease) stage 4, GFR 15-29 ml/min     COPD (chronic obstructive pulmonary disease)     DDD (degenerative disc disease), cervical and lumbar s/p surgery     GERD (gastroesophageal reflux disease)     HTN (hypertension)     Hyperlipidemia     SHAYLEE on CPAP     PSVT (paroxysmal supraventricular tachycardia)     TIA (transient ischemic attack)     Type 2 diabetes mellitus   "      Past Surgical History:   Procedure Laterality Date    ANTERIOR CERVICAL DISCECTOMY W/ FUSION  11/26/2021    C5-6, C6-7 ACDF- Dr. Jett    AV FISTULA PLACEMENT      CARPAL TUNNEL RELEASE Bilateral     bilateral CTR & left UND- 2004 & 2020    COLONOSCOPY      CORONARY STENT PLACEMENT  2018    RCA    ELBOW SURGERY Bilateral     FUNCTIONAL ENDOSCOPIC SINUS SURGERY (FESS)      INGUINAL HERNIA REPAIR      INSERTION, STENT, ARTERY  11/2022    MICRODISCECTOMY OF SPINE  04/17/2015    L4-5. L5-S1 decompression; left L4-5 microdiscectomy- Dr. Amaro    MYRINGOTOMY W/ TUBES      NASAL SEPTOPLASTY      SINUS ENDOSCOPY      SPINE SURGERY  2015    TONSILLECTOMY, ADENOIDECTOMY      VASECTOMY         Current Outpatient Medications   Medication Sig    albuterol (PROVENTIL/VENTOLIN HFA) 90 mcg/actuation inhaler Inhale 2 puffs into the lungs every 4 (four) hours as needed.    ANORO ELLIPTA 62.5-25 mcg/actuation DsDv Inhale 1 puff into the lungs once daily.    ascorbic acid, vitamin C, (VITAMIN C) 500 MG tablet Take 500 mg by mouth once daily.    BASAGLAR KWIKPEN U-100 INSULIN glargine 100 units/mL (3mL) SubQ pen Inject 25 Units into the skin every evening.    BD ULTRA-FINE MINI PEN NEEDLE 31 gauge x 3/16" Ndle     blood sugar diagnostic, drum (ACCU-CHEK COMPACT PLUS TEST) Strp Accu-Chek Compact Plus Test Strips    BRILINTA 90 mg tablet Take 90 mg by mouth 2 (two) times a day.    calcitRIOL (ROCALTROL) 0.5 MCG Cap Take 1 capsule (0.5 mcg total) by mouth once daily.    cholecalciferol, vitamin D3, 125 mcg (5,000 unit) Tab Take 5,000 Units by mouth once daily.    cloNIDine (CATAPRES) 0.2 MG tablet Take 1 tablet (0.2 mg total) by mouth 3 (three) times daily as needed (SBP > 160).    clopidogreL (PLAVIX) 75 mg tablet Take 75 mg by mouth once daily at 6am.    ergocalciferol (ERGOCALCIFEROL) 50,000 unit Cap Take 50,000 Units by mouth once a week. Mondays    EScitalopram oxalate (LEXAPRO) 10 MG tablet Take 10 mg by mouth once daily at " 6am.    fenofibrate (TRICOR) 145 MG tablet Take 145 mg by mouth once daily at 6am.    ferrous sulfate 324 mg (65 mg iron) TbEC Take 1 tablet (324 mg total) by mouth once daily.    glimepiride (AMARYL) 4 MG tablet Take 4 mg by mouth 2 (two) times a day.    isosorbide mononitrate (IMDUR) 30 MG 24 hr tablet Take 30 mg by mouth once daily.    labetaloL (NORMODYNE) 200 MG tablet Take 100 mg by mouth 2 (two) times a day.    multivit-mins/iron/folic/lycop (CENTRUM MEN ORAL) Take 1 tablet by mouth once daily at 6am.    NIFEdipine (PROCARDIA-XL) 30 MG (OSM) 24 hr tablet Take 30 mg by mouth once daily.    nitroGLYCERIN (NITROSTAT) 0.4 MG SL tablet as needed.    pantoprazole (PROTONIX) 40 MG tablet Take 40 mg by mouth once daily at 6am.    pulse oximeter (PULSE OXIMETER) device Use twice daily at 8 AM and 3 PM and [facility] as [facility].    **Please Mail to patient**    rosuvastatin (CRESTOR) 20 MG tablet Take 20 mg by mouth once daily.    sevelamer carbonate (RENVELA) 800 mg Tab Take 800 mg by mouth.    sodium bicarbonate 650 MG tablet Take 2 tablets (1,300 mg total) by mouth 2 (two) times daily.    terazosin (HYTRIN) 2 MG capsule Take 2 mg by mouth 2 (two) times a day.    torsemide (DEMADEX) 20 MG Tab Take 40 mg by mouth once daily.    TRULICITY 1.5 mg/0.5 mL pen injector once a week. Q wednesday    VASCEPA 1 gram Cap Take 2 tablets by mouth once daily.    vitamin renal formula, B-complex-vitamin c-folic acid, (NEPHROCAP) 1 mg Cap Take 1 capsule by mouth once daily.    XYOSTED 100 mg/0.5 mL AtIn Inject into the skin once a week. Q wednesday    gabapentin (NEURONTIN) 300 MG capsule Take 1 capsule (300 mg total) by mouth 3 (three) times daily.     No current facility-administered medications for this visit.       Review of patient's allergies indicates:  No Known Allergies    Family History   Problem Relation Age of Onset    Multiple sclerosis Daughter        Social History     Socioeconomic History    Marital  status:    Tobacco Use    Smoking status: Former     Packs/day: 2.00     Years: 37.00     Pack years: 74.00     Types: Cigarettes, Vaping with nicotine    Smokeless tobacco: Never    Tobacco comments:     Quit smoking cigarettes 6 years ago.  Vaped for 3 years.  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  HENT:   Denies headaches or blurry vision.  Cardiovascular:  Denies chest pain or irregular heart beat.  Respiratory:   Denies cough or shortness of breath.  Gastrointestinal:  Denies abdominal pain, nausea, or vomiting.  Musculoskeletal:   Denies muscle cramps.  Neurological:   Denies dizziness or focal weakness.  Psychiatric/Behavior: Normal mental status.  Hematology/Lymph:  Denies bleeding problem or easy bruising/bleeding.  Skin:    Denies rash or suspicious lesions.    Examination:    Vital Signs:    Vitals:    03/15/23 0838   BP: (!) 143/63   Pulse: 73   PainSc:   3       There is no height or weight on file to calculate BMI.    Constitution:   Well-developed, well nourished patient in no acute distress.  Neurological:   Alert and oriented x 3 and cooperative to examination.     Psychiatric/Behavior: Normal mental status.  Respiratory:   No shortness of breath.  Eyes:    Extraoccular muscles intact  Skin:    No scars, rash or suspicious lesions.    MSK:   Cervical spine:  Incision over the anterior aspect of cervical spine is healing well.  Limited range of motion of the cervical spine.  Spurling is negative  Bilateral upper extremities:  Incisions over the medial aspect of the elbow over the ulnar nerve the cubital tunnel are healed without any sign of infection.  There is no Tinel over the  right or left elbow.  There is a mild positive Tinel over the median nerve at the carpal tunnel bilaterally.  He has full range of motion of the elbow wrist and hand.  He has diminished sensation to all 5 digits.  Two-point discrimination is 12 mm in all 5 digits.  Apb strength is 5/5.  There is no numbness in the palmar cutaneous branch distribution.  And intrinsic strength in 1st dorsal interosseous strength is 5/5.  Radial pulse 2 +his hand is warm well perfused    EMG nerve conduction study show polyneuropathy with no focal compressive neuropathy at the elbow or wrist bilaterally    Imaging:   None     Assessment:   Polyneuropathy  Cervical radiculopathy    Plan:  His symptoms are likely due to a systemic polyneuropathy.  I will give him some gabapentin today.  He can try this to see if this helps.  If this helps he can continue getting gabapentin from his primary care doctor.  I do not see any focal point of compressive neuropathy that could benefit from surgical release at this point.  If his symptoms worsen I would be happy to see him back as needed    Follow Up:  As needed  Xray at next visit: None

## 2023-03-16 ENCOUNTER — OUTSIDE PLACE OF SERVICE (OUTPATIENT)
Dept: NEPHROLOGY | Facility: CLINIC | Age: 58
End: 2023-03-16
Payer: COMMERCIAL

## 2023-04-01 PROCEDURE — 90966 PR ESRD SERVICES, HOME DIALYSIS, PER MONTH, 20+ YR OLD: ICD-10-PCS | Mod: ,,, | Performed by: INTERNAL MEDICINE

## 2023-04-01 PROCEDURE — 90966 ESRD HOME PT SERV P MO 20+: CPT | Mod: ,,, | Performed by: INTERNAL MEDICINE

## 2023-04-10 ENCOUNTER — OUTSIDE PLACE OF SERVICE (OUTPATIENT)
Dept: NEPHROLOGY | Facility: CLINIC | Age: 58
End: 2023-04-10
Payer: COMMERCIAL

## 2023-04-11 ENCOUNTER — LAB VISIT (OUTPATIENT)
Dept: LAB | Facility: HOSPITAL | Age: 58
End: 2023-04-11
Attending: NURSE PRACTITIONER
Payer: COMMERCIAL

## 2023-04-11 DIAGNOSIS — E11.42 DIABETIC POLYNEUROPATHY ASSOCIATED WITH TYPE 2 DIABETES MELLITUS: Primary | ICD-10-CM

## 2023-04-11 DIAGNOSIS — E78.2 MIXED HYPERLIPIDEMIA: ICD-10-CM

## 2023-04-11 DIAGNOSIS — E55.9 VITAMIN D DEFICIENCY: ICD-10-CM

## 2023-04-11 LAB
ALBUMIN SERPL-MCNC: 3.2 G/DL (ref 3.5–5)
ALBUMIN/GLOB SERPL: 0.8 RATIO (ref 1.1–2)
ALP SERPL-CCNC: 63 UNIT/L (ref 40–150)
ALT SERPL-CCNC: 41 UNIT/L (ref 0–55)
AST SERPL-CCNC: 46 UNIT/L (ref 5–34)
BILIRUBIN DIRECT+TOT PNL SERPL-MCNC: 0.1 MG/DL (ref 0–?)
BILIRUBIN DIRECT+TOT PNL SERPL-MCNC: 0.3 MG/DL
BUN SERPL-MCNC: 35 MG/DL (ref 8.4–25.7)
CALCIUM SERPL-MCNC: 8.3 MG/DL (ref 8.4–10.2)
CHLORIDE SERPL-SCNC: 100 MMOL/L (ref 98–107)
CHOLEST SERPL-MCNC: 158 MG/DL
CHOLEST/HDLC SERPL: 7 {RATIO} (ref 0–5)
CK SERPL-CCNC: 1139 U/L (ref 30–200)
CO2 SERPL-SCNC: 25 MMOL/L (ref 22–29)
CREAT SERPL-MCNC: 6.12 MG/DL (ref 0.73–1.18)
DEPRECATED CALCIDIOL+CALCIFEROL SERPL-MC: 41 NG/ML (ref 30–80)
GFR SERPLBLD CREATININE-BSD FMLA CKD-EPI: 10 MLS/MIN/1.73/M2
GLOBULIN SER-MCNC: 3.8 GM/DL (ref 2.4–3.5)
GLUCOSE SERPL-MCNC: 255 MG/DL (ref 74–100)
HDLC SERPL-MCNC: 22 MG/DL (ref 35–60)
LDLC SERPL CALC-MCNC: -2 MG/DL (ref 50–140)
PHOSPHATE SERPL-MCNC: 4.9 MG/DL (ref 2.3–4.7)
POTASSIUM SERPL-SCNC: 4.6 MMOL/L (ref 3.5–5.1)
PROT SERPL-MCNC: 7 GM/DL (ref 6.4–8.3)
SODIUM SERPL-SCNC: 139 MMOL/L (ref 136–145)
TRIGL SERPL-MCNC: 688 MG/DL (ref 34–140)
URATE SERPL-MCNC: 7.4 MG/DL (ref 3.5–7.2)
VLDLC SERPL CALC-MCNC: 138 MG/DL

## 2023-04-11 PROCEDURE — 82550 ASSAY OF CK (CPK): CPT

## 2023-04-11 PROCEDURE — 80061 LIPID PANEL: CPT

## 2023-04-11 PROCEDURE — 84550 ASSAY OF BLOOD/URIC ACID: CPT

## 2023-04-11 PROCEDURE — 82306 VITAMIN D 25 HYDROXY: CPT

## 2023-04-11 PROCEDURE — 84100 ASSAY OF PHOSPHORUS: CPT

## 2023-04-11 PROCEDURE — 82248 BILIRUBIN DIRECT: CPT

## 2023-04-11 PROCEDURE — 80053 COMPREHEN METABOLIC PANEL: CPT

## 2023-04-11 PROCEDURE — 36415 COLL VENOUS BLD VENIPUNCTURE: CPT

## 2023-05-01 PROCEDURE — 90966 PR ESRD SERVICES, HOME DIALYSIS, PER MONTH, 20+ YR OLD: ICD-10-PCS | Mod: ,,, | Performed by: INTERNAL MEDICINE

## 2023-05-01 PROCEDURE — 90966 ESRD HOME PT SERV P MO 20+: CPT | Mod: ,,, | Performed by: INTERNAL MEDICINE

## 2023-05-16 ENCOUNTER — OUTSIDE PLACE OF SERVICE (OUTPATIENT)
Dept: NEPHROLOGY | Facility: CLINIC | Age: 58
End: 2023-05-16
Payer: COMMERCIAL

## 2023-07-01 PROCEDURE — 90966 ESRD HOME PT SERV P MO 20+: CPT | Mod: ,,, | Performed by: INTERNAL MEDICINE

## 2023-07-01 PROCEDURE — 90966 PR ESRD SERVICES, HOME DIALYSIS, PER MONTH, 20+ YR OLD: ICD-10-PCS | Mod: ,,, | Performed by: INTERNAL MEDICINE

## 2023-07-24 ENCOUNTER — OFFICE VISIT (OUTPATIENT)
Dept: NEUROSURGERY | Facility: CLINIC | Age: 58
End: 2023-07-24
Payer: COMMERCIAL

## 2023-07-24 ENCOUNTER — HOSPITAL ENCOUNTER (OUTPATIENT)
Dept: RADIOLOGY | Facility: HOSPITAL | Age: 58
Discharge: HOME | End: 2023-07-24
Attending: PHYSICIAN ASSISTANT
Payer: COMMERCIAL

## 2023-07-24 VITALS
SYSTOLIC BLOOD PRESSURE: 119 MMHG | WEIGHT: 250.19 LBS | DIASTOLIC BLOOD PRESSURE: 63 MMHG | RESPIRATION RATE: 16 BRPM | HEART RATE: 69 BPM | HEIGHT: 68 IN | BODY MASS INDEX: 37.92 KG/M2

## 2023-07-24 DIAGNOSIS — S12.9XXD PSEUDOARTHROSIS OF CERVICAL SPINE, SUBSEQUENT ENCOUNTER: ICD-10-CM

## 2023-07-24 DIAGNOSIS — Z98.1 STATUS POST CERVICAL SPINAL FUSION: Primary | ICD-10-CM

## 2023-07-24 PROCEDURE — 72052 X-RAY EXAM NECK SPINE 6/>VWS: CPT | Mod: TC

## 2023-07-24 PROCEDURE — 99213 OFFICE O/P EST LOW 20 MIN: CPT | Mod: ,,, | Performed by: PHYSICIAN ASSISTANT

## 2023-07-24 PROCEDURE — 99213 PR OFFICE/OUTPT VISIT, EST, LEVL III, 20-29 MIN: ICD-10-PCS | Mod: ,,, | Performed by: PHYSICIAN ASSISTANT

## 2023-07-24 RX ORDER — CALCITRIOL 0.25 UG/1
0.5 CAPSULE ORAL DAILY
COMMUNITY
Start: 2023-07-18

## 2023-07-24 RX ORDER — NAPROXEN SODIUM 220 MG/1
1 TABLET, FILM COATED ORAL EVERY MORNING
COMMUNITY

## 2023-07-24 RX ORDER — INSULIN GLARGINE 100 [IU]/ML
INJECTION, SOLUTION SUBCUTANEOUS
COMMUNITY
Start: 2023-04-05 | End: 2023-07-24

## 2023-07-24 RX ORDER — SODIUM ZIRCONIUM CYCLOSILICATE 10 G/10G
10 POWDER, FOR SUSPENSION ORAL
COMMUNITY
Start: 2023-02-06 | End: 2023-07-24

## 2023-07-24 RX ORDER — NIFEDIPINE 60 MG/1
60 TABLET, EXTENDED RELEASE ORAL
COMMUNITY
Start: 2023-07-03

## 2023-07-24 RX ORDER — AMOXICILLIN AND CLAVULANATE POTASSIUM 875; 125 MG/1; MG/1
1 TABLET, FILM COATED ORAL 2 TIMES DAILY
COMMUNITY
Start: 2023-07-03 | End: 2023-07-24

## 2023-07-24 RX ORDER — CALCIUM ACETATE 667 MG/1
1334 CAPSULE ORAL 3 TIMES DAILY
COMMUNITY
Start: 2023-06-21

## 2023-07-24 RX ORDER — LINACLOTIDE 72 UG/1
72 CAPSULE, GELATIN COATED ORAL EVERY MORNING
COMMUNITY
Start: 2023-06-29 | End: 2024-01-31

## 2023-07-24 RX ORDER — HUMAN INSULIN 100 [IU]/ML
20 INJECTION, SUSPENSION SUBCUTANEOUS
COMMUNITY
Start: 2023-07-13

## 2023-07-24 RX ORDER — DULAGLUTIDE 4.5 MG/.5ML
INJECTION, SOLUTION SUBCUTANEOUS
COMMUNITY
Start: 2023-07-13 | End: 2023-07-24

## 2023-07-24 RX ORDER — DULAGLUTIDE 1.5 MG/.5ML
1.5 INJECTION, SOLUTION SUBCUTANEOUS
COMMUNITY

## 2023-07-24 RX ORDER — LABETALOL 100 MG/1
100 TABLET, FILM COATED ORAL 2 TIMES DAILY
COMMUNITY
Start: 2023-06-06 | End: 2023-07-24

## 2023-07-24 RX ORDER — PROMETHAZINE HYDROCHLORIDE AND DEXTROMETHORPHAN HYDROBROMIDE 6.25; 15 MG/5ML; MG/5ML
5 SYRUP ORAL EVERY 6 HOURS PRN
COMMUNITY
Start: 2023-07-02 | End: 2023-07-24

## 2023-07-24 RX ORDER — DEXAMETHASONE SODIUM PHOSPHATE 1 MG/ML
SOLUTION/ DROPS OPHTHALMIC
COMMUNITY
End: 2023-07-24

## 2023-07-24 RX ORDER — BLOOD-GLUCOSE TRANSMITTER
EACH MISCELLANEOUS
COMMUNITY
Start: 2023-04-17

## 2023-07-24 RX ORDER — BACLOFEN 20 MG
1 TABLET ORAL
COMMUNITY
Start: 2023-06-20

## 2023-07-24 RX ORDER — BLOOD-GLUCOSE SENSOR
EACH MISCELLANEOUS
COMMUNITY
Start: 2023-06-24

## 2023-07-24 RX ORDER — GENTAMICIN SULFATE 1 MG/G
CREAM TOPICAL
COMMUNITY
Start: 2023-06-01

## 2023-07-24 RX ORDER — INSULIN ASPART 100 [IU]/ML
INJECTION, SOLUTION INTRAVENOUS; SUBCUTANEOUS
COMMUNITY
Start: 2023-04-05

## 2023-07-24 NOTE — PROGRESS NOTES
[facility]  History & Physical  Neurosurgery      Nicholas Vera   [id number]   1965       SUBJECTIVE:     CHIEF COMPLAINT:  Neck stiffness      HPI:  Nicholas Vera is a 57 y.o. male who presents for follow up appointment.  He is 18 months post op.  On 11/26/2021, the underwent C5-6 and C6-7 ACDF with Dr. Augie Jett.  Overall, he is doing well.  He has resumed all of his regular activity.  He is tolerated resuming the activity well.  In addition, he has returned to golf.  He reports to have stiffness in his neck.  After looking down for a time, he has to raise the head slowly due to discomfort in the neck.      EMG and NCS of bilateral upper extremities from 1/23/2023 revealed systemic polyneuropathy.  Gabapentin caused weight gain and was discontinued.  The neuropathy symptoms are no longer bothersome for him.      Past Medical History:   Diagnosis Date    Anemia     Anxiety     CAD (coronary artery disease)     CKD (chronic kidney disease) stage 4, GFR 15-29 ml/min     COPD (chronic obstructive pulmonary disease)     DDD (degenerative disc disease), cervical and lumbar s/p surgery     GERD (gastroesophageal reflux disease)     HTN (hypertension)     Hyperlipidemia     SHAYLEE on CPAP     PSVT (paroxysmal supraventricular tachycardia)     TIA (transient ischemic attack)     Type 2 diabetes mellitus        Past Surgical History:   Procedure Laterality Date    ANTERIOR CERVICAL DISCECTOMY W/ FUSION  11/26/2021    C5-6, C6-7 ACDF- Dr. Jett    AV FISTULA PLACEMENT      CARPAL TUNNEL RELEASE Bilateral     bilateral CTR & left UND- 2004 & 2020    COLONOSCOPY      CORONARY STENT PLACEMENT  2018    RCA    ELBOW SURGERY Bilateral     FUNCTIONAL ENDOSCOPIC SINUS SURGERY (FESS)      INGUINAL HERNIA REPAIR      INSERTION, STENT, ARTERY  11/2022    MICRODISCECTOMY OF SPINE  04/17/2015    L4-5. L5-S1 decompression; left L4-5 microdiscectomy- Dr. Amaro    MYRINGOTOMY W/ TUBES      NASAL  "SEPTOPLASTY      SINUS ENDOSCOPY      SPINE SURGERY  2015    TONSILLECTOMY, ADENOIDECTOMY      VASECTOMY         Family History   Problem Relation Age of Onset    Multiple sclerosis Daughter        Social History     Socioeconomic History    Marital status:    Tobacco Use    Smoking status: Former     Packs/day: 2.00     Years: 37.00     Pack years: 74.00     Types: Cigarettes, Vaping with nicotine    Smokeless tobacco: Never    Tobacco comments:     Quit smoking cigarettes 6 years ago.  Vaped for 3 years.  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    blood sugar diagnostic, drum (ACCU-CHEK COMPACT PLUS TEST) Strp Accu-Chek Compact Plus Test Strips    calcitRIOL (ROCALTROL) 0.25 mcg, Oral    calcium acetate(phosphat bind) (PHOSLO) 1,334 mg, Oral, 3 times daily    cholecalciferol (vitamin D3) 5,000 Units, Oral, Daily    clopidogreL (PLAVIX) 75 mg, Oral, Daily    DEXCOM G6 SENSOR Savana SMARTSI Each Topical Every 10 Days    DEXCOM G6 TRANSMITTER Savana No dose, route, or frequency recorded.    EScitalopram oxalate (LEXAPRO) 10 mg, Oral, Daily    fenofibrate (TRICOR) 145 mg, Oral, Daily    gabapentin (NEURONTIN) 300 mg, Oral, 3 times daily    gentamicin (GARAMYCIN) 0.1 % cream Topical (Top)    insulin aspart U-100 (NOVOLOG) 100 unit/mL (3 mL) " "InPn pen Subcutaneous    isosorbide mononitrate (IMDUR) 30 mg, Oral, Daily    labetaloL (NORMODYNE) 100 mg, Oral, 2 times daily    LINZESS 72 mcg, Oral, Every morning    magnesium oxide 500 mg Tab 1 tablet, Oral    multivit-mins/iron/folic/lycop (CENTRUM MEN ORAL) 1 tablet, Oral, Daily    NIFEdipine (ADALAT CC) 60 mg, Oral    nitroGLYCERIN (NITROSTAT) 0.4 MG SL tablet As needed (PRN)    NovoLIN N FlexPen 15 Units, Subcutaneous    pantoprazole (PROTONIX) 40 mg, Oral, Daily    rosuvastatin (CRESTOR) 20 mg, Oral, Daily    sodium bicarbonate 1,300 mg, Oral, 2 times daily    terazosin (HYTRIN) 2 mg, Oral, 2 times daily    torsemide (DEMADEX) 40 mg, Oral, Daily    TRULICITY 1.5 mg, Subcutaneous    VASCEPA 1 gram Cap 2 tablets, Oral, Daily    XYOSTED 100 mg/0.5 mL AtIn Subcutaneous, Weekly, Q wednesday        Review of Systems:    Review of Systems   Constitutional:  Negative for chills and fever.   HENT:  Negative for nosebleeds and sore throat.    Eyes:  Negative for pain and visual disturbance.   Respiratory:  Negative for cough, chest tightness and shortness of breath.    Cardiovascular:  Negative for chest pain.   Gastrointestinal:  Negative for diarrhea, nausea and vomiting.   Genitourinary:  Negative for difficulty urinating, dysuria and hematuria.   Musculoskeletal:  Positive for neck stiffness. Negative for gait problem and myalgias.   Skin:  Negative for rash.   Neurological:  Negative for dizziness, facial asymmetry and headaches.   Psychiatric/Behavioral:  Negative for confusion and sleep disturbance. The patient is not nervous/anxious.         OBJECTIVE:       Visit Vitals  /63   Pulse 69   Resp 16   Ht 5' 8" (1.727 m)   Wt 113.5 kg (250 lb 3.2 oz)   BMI 38.04 kg/m²        General:  Pleasant, Well-nourished, Well-groomed.    Lungs:  Breathing is quiet with non-labored respirations.    Musculoskeletal:  Cervical ROM: moderately limited    Neurological:    Alert and oriented to person, place, time, and " situation.  His memory, cognition, and affect are appropriate.  Speech is fluent.  Muscle strength against resistance:  Strength  Deltoids Triceps Biceps Wrist Extension Wrist Flexion Intrinsics   Upper: R 5/5 5/5 5/5 5/5  5/5    L 5/5 5/5 5/5 5/5  5/5   Gait is normal.   Coordination is normal.    Imaging:  Cervical x-rays obtained today, 7/24/2023, show fusion to be solid at C5-6, but remains pseudoarthrosis at C6-7.        ASSESSMENT:     1. Status post cervical spinal fusion           PLAN:     Overall, the patient is doing well.  He is tolerating all of his regular activity.  He will return for follow up on an as needed basis.      A total of 11 minutes was spent face-to-face with the patient during this encounter.  Over half of that time was spent on counseling and coordination of care.  Additional time was used to review the patient's chart including results from electrical studies, cervical x-ray images, compare with old, and work on office chart.      Ritu Aburto PA-C      Disclaimer:  This note is prepared using voice recognition software and as such is likely to have errors despite attempts at proofreading.

## 2023-07-25 ENCOUNTER — OUTSIDE PLACE OF SERVICE (OUTPATIENT)
Dept: NEPHROLOGY | Facility: CLINIC | Age: 58
End: 2023-07-25
Payer: COMMERCIAL

## 2023-07-26 ENCOUNTER — LAB VISIT (OUTPATIENT)
Dept: LAB | Facility: HOSPITAL | Age: 58
End: 2023-07-26
Attending: INTERNAL MEDICINE
Payer: COMMERCIAL

## 2023-07-26 DIAGNOSIS — E87.5 HYPERPOTASSEMIA: Primary | ICD-10-CM

## 2023-07-26 LAB — POTASSIUM SERPL-SCNC: 5.2 MMOL/L (ref 3.5–5.1)

## 2023-07-26 PROCEDURE — 84132 ASSAY OF SERUM POTASSIUM: CPT

## 2023-07-26 PROCEDURE — 36415 COLL VENOUS BLD VENIPUNCTURE: CPT

## 2023-08-01 PROCEDURE — 90966 ESRD HOME PT SERV P MO 20+: CPT | Mod: ,,, | Performed by: INTERNAL MEDICINE

## 2023-08-01 PROCEDURE — 90966 PR ESRD SERVICES, HOME DIALYSIS, PER MONTH, 20+ YR OLD: ICD-10-PCS | Mod: ,,, | Performed by: INTERNAL MEDICINE

## 2023-08-08 ENCOUNTER — OFFICE VISIT (OUTPATIENT)
Dept: ORTHOPEDICS | Facility: CLINIC | Age: 58
End: 2023-08-08
Payer: COMMERCIAL

## 2023-08-08 ENCOUNTER — HOSPITAL ENCOUNTER (OUTPATIENT)
Dept: RADIOLOGY | Facility: CLINIC | Age: 58
Discharge: HOME | End: 2023-08-08
Attending: PHYSICIAN ASSISTANT
Payer: COMMERCIAL

## 2023-08-08 VITALS
HEIGHT: 68 IN | SYSTOLIC BLOOD PRESSURE: 170 MMHG | DIASTOLIC BLOOD PRESSURE: 74 MMHG | BODY MASS INDEX: 37.89 KG/M2 | WEIGHT: 250 LBS | HEART RATE: 76 BPM

## 2023-08-08 DIAGNOSIS — M19.071 PRIMARY OSTEOARTHRITIS OF RIGHT ANKLE: ICD-10-CM

## 2023-08-08 DIAGNOSIS — M25.571 RIGHT ANKLE PAIN, UNSPECIFIED CHRONICITY: ICD-10-CM

## 2023-08-08 DIAGNOSIS — M25.571 RIGHT ANKLE PAIN, UNSPECIFIED CHRONICITY: Primary | ICD-10-CM

## 2023-08-08 PROCEDURE — 99213 OFFICE O/P EST LOW 20 MIN: CPT | Mod: ,,, | Performed by: PHYSICIAN ASSISTANT

## 2023-08-08 PROCEDURE — 73610 X-RAY EXAM OF ANKLE: CPT | Mod: RT,,, | Performed by: PHYSICIAN ASSISTANT

## 2023-08-08 PROCEDURE — 99213 PR OFFICE/OUTPT VISIT, EST, LEVL III, 20-29 MIN: ICD-10-PCS | Mod: ,,, | Performed by: PHYSICIAN ASSISTANT

## 2023-08-08 PROCEDURE — 73610 XR ANKLE COMPLETE 3 VIEW RIGHT: ICD-10-PCS | Mod: RT,,, | Performed by: PHYSICIAN ASSISTANT

## 2023-08-08 RX ORDER — DICLOFENAC SODIUM 10 MG/G
2 GEL TOPICAL 4 TIMES DAILY
Qty: 2 G | Refills: 3 | Status: SHIPPED | OUTPATIENT
Start: 2023-08-08 | End: 2024-02-15

## 2023-08-08 NOTE — PROGRESS NOTES
Chief Complaint:   Chief Complaint   Patient presents with    Right Ankle - Pain     Right ankle pain. Pain is like a soreness and hurts when he walks on it. No swelling, has tenderness. Pain will radiate up into knee. Been hurting for 3-4 months and getting worse.        History of present illness:    57-year-old male presents office today for evaluation his right ankle pain.  This has been present for 3-4 months with no associated injury.  His pain started while walking a lot in [place].  He denies any injury.  He notes some anterior ankle pain with some mild swelling.  He notes pain with activities such as golfing.  No pain at rest.  It does get somewhat better.  Patient is an insulin-dependent diabetic currently on Plavix with a history of stents so he can not take NSAIDs.    Past Medical History:   Diagnosis Date    Anemia     Anxiety     CAD (coronary artery disease)     CKD (chronic kidney disease) stage 4, GFR 15-29 ml/min     COPD (chronic obstructive pulmonary disease)     DDD (degenerative disc disease), cervical and lumbar s/p surgery     GERD (gastroesophageal reflux disease)     HTN (hypertension)     Hyperlipidemia     SHAYLEE on CPAP     PSVT (paroxysmal supraventricular tachycardia)     TIA (transient ischemic attack)     Type 2 diabetes mellitus        Past Surgical History:   Procedure Laterality Date    ANTERIOR CERVICAL DISCECTOMY W/ FUSION  11/26/2021    C5-6, C6-7 ACDF- Dr. Jett    AV FISTULA PLACEMENT      CARPAL TUNNEL RELEASE Bilateral     bilateral CTR & left UND- 2004 & 2020    COLONOSCOPY      CORONARY STENT PLACEMENT  2018    RCA    ELBOW SURGERY Bilateral     FUNCTIONAL ENDOSCOPIC SINUS SURGERY (FESS)      INGUINAL HERNIA REPAIR      INSERTION, STENT, ARTERY  11/2022    MICRODISCECTOMY OF SPINE  04/17/2015    L4-5. L5-S1 decompression; left L4-5 microdiscectomy- Dr. Amaro    MYRINGOTOMY W/ TUBES      NASAL SEPTOPLASTY      SINUS ENDOSCOPY      SPINE SURGERY  2015     "TONSILLECTOMY, ADENOIDECTOMY      VASECTOMY         Current Outpatient Medications   Medication Sig    ANORO ELLIPTA 62.5-25 mcg/actuation DsDv Inhale 1 puff into the lungs once daily.    aspirin 81 MG Chew Take 1 tablet by mouth every morning.    BASAGLAR KWIKPEN U-100 INSULIN glargine 100 units/mL (3mL) SubQ pen Inject 60 Units into the skin every evening.    BD ULTRA-FINE MINI PEN NEEDLE 31 gauge x 3/16" Ndle     blood sugar diagnostic, drum (ACCU-CHEK COMPACT PLUS TEST) Strp Accu-Chek Compact Plus Test Strips    calcitRIOL (ROCALTROL) 0.25 MCG Cap Take 0.25 mcg by mouth.    calcium acetate,phosphat bind, (PHOSLO) 667 mg capsule Take 1,334 mg by mouth 3 (three) times daily.    cholecalciferol, vitamin D3, 125 mcg (5,000 unit) Tab Take 5,000 Units by mouth once daily.    clopidogreL (PLAVIX) 75 mg tablet Take 75 mg by mouth once daily at 6am.    DEXCOM G6 SENSOR Savana SMARTSI Each Topical Every 10 Days    DEXCOM G6 TRANSMITTER Savana     diclofenac sodium (VOLTAREN) 1 % Gel Apply 2 g topically 4 (four) times daily.    dulaglutide (TRULICITY) 1.5 mg/0.5 mL pen injector Inject 1.5 mg into the skin.    EScitalopram oxalate (LEXAPRO) 10 MG tablet Take 10 mg by mouth once daily at 6am.    fenofibrate (TRICOR) 145 MG tablet Take 145 mg by mouth once daily at 6am.    gabapentin (NEURONTIN) 300 MG capsule Take 1 capsule (300 mg total) by mouth 3 (three) times daily.    gentamicin (GARAMYCIN) 0.1 % cream Apply topically.    insulin aspart U-100 (NOVOLOG) 100 unit/mL (3 mL) InPn pen Inject into the skin.    isosorbide mononitrate (IMDUR) 30 MG 24 hr tablet Take 30 mg by mouth once daily.    labetaloL (NORMODYNE) 200 MG tablet Take 100 mg by mouth 2 (two) times a day.    LINZESS 72 mcg Cap capsule Take 72 mcg by mouth every morning.    magnesium oxide 500 mg Tab Take 1 tablet by mouth.    multivit-mins/iron/folic/lycop (CENTRUM MEN ORAL) Take 1 tablet by mouth once daily at 6am.    NIFEdipine (ADALAT CC) 60 MG TbSR Take 60 " mg by mouth.    nitroGLYCERIN (NITROSTAT) 0.4 MG SL tablet as needed.    NOVOLIN N FLEXPEN 100 unit/mL (3 mL) InPn Inject 15 Units into the skin.    pantoprazole (PROTONIX) 40 MG tablet Take 40 mg by mouth once daily at 6am.    rosuvastatin (CRESTOR) 20 MG tablet Take 20 mg by mouth once daily.    sodium bicarbonate 650 MG tablet Take 2 tablets (1,300 mg total) by mouth 2 (two) times daily.    terazosin (HYTRIN) 2 MG capsule Take 2 mg by mouth 2 (two) times a day.    torsemide (DEMADEX) 20 MG Tab Take 40 mg by mouth once daily.    VASCEPA 1 gram Cap Take 2 tablets by mouth once daily.    XYOSTED 100 mg/0.5 mL AtIn Inject into the skin once a week. Q wednesday     No current facility-administered medications for this visit.       Review of patient's allergies indicates:   Allergen Reactions    Ticagrelor Shortness Of Breath       Family History   Problem Relation Age of Onset    Multiple sclerosis Daughter        Social History     Socioeconomic History    Marital status:    Tobacco Use    Smoking status: Former     Current packs/day: 2.00     Average packs/day: 2.0 packs/day for 37.0 years (74.0 ttl pk-yrs)     Types: Cigarettes, Vaping with nicotine    Smokeless tobacco: Never    Tobacco comments:     Quit smoking cigarettes 6 years ago.  Vaped for 3 years.  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  HENT:   Denies headaches or blurry vision.  Cardiovascular:  Denies chest pain or irregular heart beat.  Respiratory:   Denies cough or shortness of breath.  Gastrointestinal:  Denies abdominal pain, nausea, or vomiting.  Musculoskeletal:   Denies muscle cramps.  Neurological:   Denies dizziness or  "focal weakness.  Psychiatric/Behavior: Normal mental status.  Hematology/Lymph:  Denies bleeding problem or easy bruising/bleeding.  Skin:    Denies rash or suspicious lesions.    Examination:    Vital Signs:    Vitals:    08/08/23 0948   BP: (!) 170/74   Pulse: 76   Weight: 113.4 kg (250 lb)   Height: 5' 8" (1.727 m)       Body mass index is 38.01 kg/m².    Constitution:   Well-developed, well nourished patient in no acute distress.  Neurological:   Alert and oriented x 3 and cooperative to examination.     Psychiatric/Behavior: Normal mental status.  Respiratory:   No shortness of breath.  Nonlabored breathing  Cardiovascular:           Regular rate and rhythm  Eyes:    Extraoccular muscles intact  Skin:    No scars, rash or suspicious lesions.    Physical Exam:     Right ankle exam   Mild effusion, no erythema   He has some tenderness over the anterior joint line   He has no medial tenderness  No lateral tenderness   No tenderness over the lateral collateral ligaments   No instability on stress exam   No hindfoot tenderness   Intact range of motion in dorsiflexion, plantar flexion, eversion and inversion.  No weakness or pain with resistance   Sensation intact distally   Pedal pulses 2+     Right ankle radiographs taken office today, three views show some mild joint space narrowing through the tibiotalar joint.  There is small anterior distal tibial osteophyte.  No fractures        Assessment:     Right ankle pain, unspecified chronicity  -     X-Ray Ankle Complete Right; Future; Expected date: 08/08/2023    Primary osteoarthritis of right ankle    Other orders  -     diclofenac sodium (VOLTAREN) 1 % Gel; Apply 2 g topically 4 (four) times daily.  Dispense: 2 g; Refill: 3        Plan:      I have discussed exam and x-ray findings with the patient today.  He does have some osteoarthritis of the ankle.  We have discussed activity modification.  I will prescribe him some Voltaren gel as he can not have prednisone " with his insulin-dependent diabetes nor any NSAIDs with his history of stents on Plavix currently.  We discussed ice and an ankle brace for comfort.  I will give him a as needed follow-up.  If his symptoms persist we have discussed the possibility of an intra-articular corticosteroid injection but getting this cleared with his kidney doctor first        No follow-ups on file.    DISCLAIMER: This note may have been dictated using voice recognition software and may contain grammatical errors.

## 2023-08-28 ENCOUNTER — LAB VISIT (OUTPATIENT)
Dept: LAB | Facility: HOSPITAL | Age: 58
End: 2023-08-28
Attending: INTERNAL MEDICINE
Payer: COMMERCIAL

## 2023-08-28 ENCOUNTER — OUTSIDE PLACE OF SERVICE (OUTPATIENT)
Dept: NEPHROLOGY | Facility: CLINIC | Age: 58
End: 2023-08-28
Payer: COMMERCIAL

## 2023-08-28 DIAGNOSIS — Z79.899 ENCOUNTER FOR LONG-TERM (CURRENT) USE OF OTHER MEDICATIONS: Primary | ICD-10-CM

## 2023-08-28 DIAGNOSIS — I25.118 ATHSCL HEART DISEASE OF NATIVE COR ART W OTH ANG PCTRS: ICD-10-CM

## 2023-08-28 DIAGNOSIS — E78.5 HYPERLIPIDEMIA, UNSPECIFIED HYPERLIPIDEMIA TYPE: ICD-10-CM

## 2023-08-28 LAB
ALBUMIN SERPL-MCNC: 3.7 G/DL (ref 3.5–5)
ALBUMIN/GLOB SERPL: 1.1 RATIO (ref 1.1–2)
ALP SERPL-CCNC: 55 UNIT/L (ref 40–150)
ALT SERPL-CCNC: 61 UNIT/L (ref 0–55)
AST SERPL-CCNC: 60 UNIT/L (ref 5–34)
BILIRUB SERPL-MCNC: 0.5 MG/DL
BUN SERPL-MCNC: 47.5 MG/DL (ref 8.4–25.7)
CALCIUM SERPL-MCNC: 8.4 MG/DL (ref 8.4–10.2)
CHLORIDE SERPL-SCNC: 100 MMOL/L (ref 98–107)
CHOLEST SERPL-MCNC: 187 MG/DL
CHOLEST/HDLC SERPL: 7 {RATIO} (ref 0–5)
CO2 SERPL-SCNC: 28 MMOL/L (ref 22–29)
CREAT SERPL-MCNC: 7.21 MG/DL (ref 0.73–1.18)
GFR SERPLBLD CREATININE-BSD FMLA CKD-EPI: 8 MLS/MIN/1.73/M2
GLOBULIN SER-MCNC: 3.4 GM/DL (ref 2.4–3.5)
GLUCOSE SERPL-MCNC: 171 MG/DL (ref 74–100)
HDLC SERPL-MCNC: 25 MG/DL (ref 35–60)
LDLC SERPL CALC-MCNC: 57 MG/DL (ref 50–140)
POTASSIUM SERPL-SCNC: 4.5 MMOL/L (ref 3.5–5.1)
PROT SERPL-MCNC: 7.1 GM/DL (ref 6.4–8.3)
SODIUM SERPL-SCNC: 140 MMOL/L (ref 136–145)
TRIGL SERPL-MCNC: 527 MG/DL (ref 34–140)
VLDLC SERPL CALC-MCNC: 105 MG/DL

## 2023-08-28 PROCEDURE — 80061 LIPID PANEL: CPT

## 2023-08-28 PROCEDURE — 36415 COLL VENOUS BLD VENIPUNCTURE: CPT

## 2023-08-28 PROCEDURE — 80053 COMPREHEN METABOLIC PANEL: CPT

## 2023-09-01 PROCEDURE — 90966 PR ESRD SERVICES, HOME DIALYSIS, PER MONTH, 20+ YR OLD: ICD-10-PCS | Mod: ,,, | Performed by: INTERNAL MEDICINE

## 2023-09-01 PROCEDURE — 90966 ESRD HOME PT SERV P MO 20+: CPT | Mod: ,,, | Performed by: INTERNAL MEDICINE

## 2023-09-19 ENCOUNTER — OUTSIDE PLACE OF SERVICE (OUTPATIENT)
Dept: NEPHROLOGY | Facility: CLINIC | Age: 58
End: 2023-09-19
Payer: COMMERCIAL

## 2023-09-25 ENCOUNTER — HOSPITAL ENCOUNTER (OUTPATIENT)
Dept: RADIOLOGY | Facility: HOSPITAL | Age: 58
Discharge: HOME | End: 2023-09-25
Payer: COMMERCIAL

## 2023-09-25 DIAGNOSIS — Z87.891 FORMER SMOKER: ICD-10-CM

## 2023-09-25 PROCEDURE — 71271 CT THORAX LUNG CANCER SCR C-: CPT | Mod: TC

## 2023-10-01 PROCEDURE — 90966 PR ESRD SERVICES, HOME DIALYSIS, PER MONTH, 20+ YR OLD: ICD-10-PCS | Mod: ,,, | Performed by: INTERNAL MEDICINE

## 2023-10-01 PROCEDURE — 90966 ESRD HOME PT SERV P MO 20+: CPT | Mod: ,,, | Performed by: INTERNAL MEDICINE

## 2023-10-02 ENCOUNTER — DOCUMENTATION ONLY (OUTPATIENT)
Dept: CRITICAL CARE MEDICINE | Facility: HOSPITAL | Age: 58
End: 2023-10-02
Payer: COMMERCIAL

## 2023-10-02 NOTE — PROGRESS NOTES
CT chest 09/25/2023 personally reviewed:  Approximally 5 mm right lower lobe lateral lung nodule (series 13, image 328), approximately 2mm RUL nodule (13/144), neither of which I can definitively identify on prior CT chest.  Nevertheless, the size of these nodules does not require any more aggressive surveillance, continue yearly low-dose CT scan for lung cancer screening.  Keep regularly scheduled follow-up.          Pierre Nayak MD  Pulmonary Disease and Critical Care Medicine

## 2023-10-17 ENCOUNTER — OUTSIDE PLACE OF SERVICE (OUTPATIENT)
Dept: NEPHROLOGY | Facility: CLINIC | Age: 58
End: 2023-10-17
Payer: COMMERCIAL

## 2023-11-01 PROCEDURE — 90966 PR ESRD SERVICES, HOME DIALYSIS, PER MONTH, 20+ YR OLD: ICD-10-PCS | Mod: ,,, | Performed by: INTERNAL MEDICINE

## 2023-11-01 PROCEDURE — 90966 ESRD HOME PT SERV P MO 20+: CPT | Mod: ,,, | Performed by: INTERNAL MEDICINE

## 2023-11-14 ENCOUNTER — LAB VISIT (OUTPATIENT)
Dept: LAB | Facility: HOSPITAL | Age: 58
End: 2023-11-14
Attending: NURSE PRACTITIONER
Payer: COMMERCIAL

## 2023-11-14 DIAGNOSIS — E78.2 MIXED HYPERLIPIDEMIA: Primary | ICD-10-CM

## 2023-11-14 DIAGNOSIS — E11.9 DIABETES MELLITUS WITHOUT COMPLICATION: ICD-10-CM

## 2023-11-14 DIAGNOSIS — I10 HYPERTENSION, UNSPECIFIED TYPE: ICD-10-CM

## 2023-11-14 LAB
ALBUMIN SERPL-MCNC: 3.5 G/DL (ref 3.5–5)
ALBUMIN/GLOB SERPL: 0.9 RATIO (ref 1.1–2)
ALP SERPL-CCNC: 55 UNIT/L (ref 40–150)
ALT SERPL-CCNC: 40 UNIT/L (ref 0–55)
APPEARANCE UR: CLEAR
AST SERPL-CCNC: 41 UNIT/L (ref 5–34)
BACTERIA #/AREA URNS AUTO: NORMAL /HPF
BASOPHILS # BLD AUTO: 0.09 X10(3)/MCL
BASOPHILS NFR BLD AUTO: 1.1 %
BILIRUB SERPL-MCNC: 0.5 MG/DL
BILIRUB UR QL STRIP.AUTO: NEGATIVE
BUN SERPL-MCNC: 54 MG/DL (ref 8.4–25.7)
CALCIUM SERPL-MCNC: 9.5 MG/DL (ref 8.4–10.2)
CHLORIDE SERPL-SCNC: 100 MMOL/L (ref 98–107)
CHOLEST SERPL-MCNC: 169 MG/DL
CHOLEST/HDLC SERPL: 8 {RATIO} (ref 0–5)
CO2 SERPL-SCNC: 25 MMOL/L (ref 22–29)
COLOR UR AUTO: YELLOW
CREAT SERPL-MCNC: 6.78 MG/DL (ref 0.73–1.18)
CREAT UR-MCNC: 86.5 MG/DL (ref 63–166)
EOSINOPHIL # BLD AUTO: 0.26 X10(3)/MCL (ref 0–0.9)
EOSINOPHIL NFR BLD AUTO: 3.1 %
ERYTHROCYTE [DISTWIDTH] IN BLOOD BY AUTOMATED COUNT: 12.4 % (ref 11.5–17)
EST. AVERAGE GLUCOSE BLD GHB EST-MCNC: 151.3 MG/DL
GFR SERPLBLD CREATININE-BSD FMLA CKD-EPI: 9 MLS/MIN/1.73/M2
GLOBULIN SER-MCNC: 4.1 GM/DL (ref 2.4–3.5)
GLUCOSE SERPL-MCNC: 169 MG/DL (ref 74–100)
GLUCOSE UR QL STRIP.AUTO: ABNORMAL
HBA1C MFR BLD: 6.9 %
HCT VFR BLD AUTO: 39.5 % (ref 42–52)
HDLC SERPL-MCNC: 22 MG/DL (ref 35–60)
HGB BLD-MCNC: 13 G/DL (ref 14–18)
IMM GRANULOCYTES # BLD AUTO: 0.09 X10(3)/MCL (ref 0–0.04)
IMM GRANULOCYTES NFR BLD AUTO: 1.1 %
KETONES UR QL STRIP.AUTO: NEGATIVE
LEUKOCYTE ESTERASE UR QL STRIP.AUTO: NEGATIVE
LYMPHOCYTES # BLD AUTO: 1.37 X10(3)/MCL (ref 0.6–4.6)
LYMPHOCYTES NFR BLD AUTO: 16.5 %
MCH RBC QN AUTO: 29.4 PG (ref 27–31)
MCHC RBC AUTO-ENTMCNC: 32.9 G/DL (ref 33–36)
MCV RBC AUTO: 89.4 FL (ref 80–94)
MICROALBUMIN UR-MCNC: 1006.4 UG/ML
MICROALBUMIN/CREAT RATIO PNL UR: 1163.5 MG/GM CR (ref 0–30)
MONOCYTES # BLD AUTO: 0.79 X10(3)/MCL (ref 0.1–1.3)
MONOCYTES NFR BLD AUTO: 9.5 %
NEUTROPHILS # BLD AUTO: 5.71 X10(3)/MCL (ref 2.1–9.2)
NEUTROPHILS NFR BLD AUTO: 68.7 %
NITRITE UR QL STRIP.AUTO: NEGATIVE
PH UR STRIP.AUTO: 7 [PH]
PLATELET # BLD AUTO: 246 X10(3)/MCL (ref 130–400)
PMV BLD AUTO: 10.6 FL (ref 7.4–10.4)
POTASSIUM SERPL-SCNC: 4.8 MMOL/L (ref 3.5–5.1)
PROT SERPL-MCNC: 7.6 GM/DL (ref 6.4–8.3)
PROT UR QL STRIP.AUTO: ABNORMAL
RBC # BLD AUTO: 4.42 X10(6)/MCL (ref 4.7–6.1)
RBC #/AREA URNS AUTO: NORMAL /HPF
RBC UR QL AUTO: ABNORMAL
SODIUM SERPL-SCNC: 138 MMOL/L (ref 136–145)
SP GR UR STRIP.AUTO: 1.01 (ref 1–1.03)
SQUAMOUS #/AREA URNS AUTO: NORMAL /HPF
TRIGL SERPL-MCNC: 580 MG/DL (ref 34–140)
UROBILINOGEN UR STRIP-ACNC: 0.2
WBC # SPEC AUTO: 8.31 X10(3)/MCL (ref 4.5–11.5)
WBC #/AREA URNS AUTO: NORMAL /HPF

## 2023-11-14 PROCEDURE — 36415 COLL VENOUS BLD VENIPUNCTURE: CPT

## 2023-11-14 PROCEDURE — 80053 COMPREHEN METABOLIC PANEL: CPT

## 2023-11-14 PROCEDURE — 81001 URINALYSIS AUTO W/SCOPE: CPT

## 2023-11-14 PROCEDURE — 80061 LIPID PANEL: CPT

## 2023-11-14 PROCEDURE — 83036 HEMOGLOBIN GLYCOSYLATED A1C: CPT

## 2023-11-14 PROCEDURE — 85025 COMPLETE CBC W/AUTO DIFF WBC: CPT

## 2023-11-14 PROCEDURE — 82043 UR ALBUMIN QUANTITATIVE: CPT

## 2023-11-28 ENCOUNTER — OUTSIDE PLACE OF SERVICE (OUTPATIENT)
Dept: NEPHROLOGY | Facility: CLINIC | Age: 58
End: 2023-11-28
Payer: COMMERCIAL

## 2023-12-01 PROCEDURE — 90966 ESRD HOME PT SERV P MO 20+: CPT | Mod: ,,, | Performed by: INTERNAL MEDICINE

## 2023-12-01 PROCEDURE — 90966 PR ESRD SERVICES, HOME DIALYSIS, PER MONTH, 20+ YR OLD: ICD-10-PCS | Mod: ,,, | Performed by: INTERNAL MEDICINE

## 2023-12-19 ENCOUNTER — OUTSIDE PLACE OF SERVICE (OUTPATIENT)
Dept: NEPHROLOGY | Facility: CLINIC | Age: 58
End: 2023-12-19
Payer: COMMERCIAL

## 2024-01-01 PROCEDURE — 90966 ESRD HOME PT SERV P MO 20+: CPT | Mod: ,,, | Performed by: INTERNAL MEDICINE

## 2024-01-09 ENCOUNTER — TELEPHONE (OUTPATIENT)
Dept: SURGERY | Facility: CLINIC | Age: 59
End: 2024-01-09
Payer: COMMERCIAL

## 2024-01-17 ENCOUNTER — TELEPHONE (OUTPATIENT)
Dept: SURGERY | Facility: CLINIC | Age: 59
End: 2024-01-17
Payer: COMMERCIAL

## 2024-01-17 NOTE — TELEPHONE ENCOUNTER
Attempted to contact pt to schedule an appt, we received pts cardiac clearance for his hernia repair pt was last seen in 02/23   I did put past records the chart, placed pt on a reminder to fu and see if he would like to schedule surgery.

## 2024-01-24 ENCOUNTER — TELEPHONE (OUTPATIENT)
Dept: SURGERY | Facility: CLINIC | Age: 59
End: 2024-01-24
Payer: COMMERCIAL

## 2024-01-29 ENCOUNTER — OUTSIDE PLACE OF SERVICE (OUTPATIENT)
Dept: NEPHROLOGY | Facility: CLINIC | Age: 59
End: 2024-01-29
Payer: COMMERCIAL

## 2024-01-31 ENCOUNTER — LAB VISIT (OUTPATIENT)
Dept: LAB | Facility: HOSPITAL | Age: 59
End: 2024-01-31
Attending: SURGERY
Payer: COMMERCIAL

## 2024-01-31 ENCOUNTER — OFFICE VISIT (OUTPATIENT)
Dept: SURGERY | Facility: CLINIC | Age: 59
End: 2024-01-31
Payer: COMMERCIAL

## 2024-01-31 VITALS
SYSTOLIC BLOOD PRESSURE: 145 MMHG | HEIGHT: 68 IN | DIASTOLIC BLOOD PRESSURE: 66 MMHG | HEART RATE: 69 BPM | BODY MASS INDEX: 38.8 KG/M2 | WEIGHT: 256 LBS

## 2024-01-31 DIAGNOSIS — K40.90 RIGHT INGUINAL HERNIA: ICD-10-CM

## 2024-01-31 DIAGNOSIS — K40.90 RIGHT INGUINAL HERNIA: Primary | ICD-10-CM

## 2024-01-31 LAB
ALBUMIN SERPL-MCNC: 3.4 G/DL (ref 3.5–5)
ALBUMIN/GLOB SERPL: 1.1 RATIO (ref 1.1–2)
ALP SERPL-CCNC: 46 UNIT/L (ref 40–150)
ALT SERPL-CCNC: 35 UNIT/L (ref 0–55)
AST SERPL-CCNC: 43 UNIT/L (ref 5–34)
BASOPHILS # BLD AUTO: 0.1 X10(3)/MCL
BASOPHILS NFR BLD AUTO: 1.1 %
BILIRUB SERPL-MCNC: 0.5 MG/DL
BUN SERPL-MCNC: 42.7 MG/DL (ref 8.4–25.7)
CALCIUM SERPL-MCNC: 9 MG/DL (ref 8.4–10.2)
CHLORIDE SERPL-SCNC: 105 MMOL/L (ref 98–107)
CO2 SERPL-SCNC: 24 MMOL/L (ref 22–29)
CREAT SERPL-MCNC: 6.62 MG/DL (ref 0.73–1.18)
EOSINOPHIL # BLD AUTO: 0.27 X10(3)/MCL (ref 0–0.9)
EOSINOPHIL NFR BLD AUTO: 3.1 %
ERYTHROCYTE [DISTWIDTH] IN BLOOD BY AUTOMATED COUNT: 13.5 % (ref 11.5–17)
GFR SERPLBLD CREATININE-BSD FMLA CKD-EPI: 9 MLS/MIN/1.73/M2
GLOBULIN SER-MCNC: 3.1 GM/DL (ref 2.4–3.5)
GLUCOSE SERPL-MCNC: 65 MG/DL (ref 74–100)
HCT VFR BLD AUTO: 37.6 % (ref 42–52)
HGB BLD-MCNC: 11.8 G/DL (ref 14–18)
IMM GRANULOCYTES # BLD AUTO: 0.06 X10(3)/MCL (ref 0–0.04)
IMM GRANULOCYTES NFR BLD AUTO: 0.7 %
LYMPHOCYTES # BLD AUTO: 1.45 X10(3)/MCL (ref 0.6–4.6)
LYMPHOCYTES NFR BLD AUTO: 16.6 %
MCH RBC QN AUTO: 29.3 PG (ref 27–31)
MCHC RBC AUTO-ENTMCNC: 31.4 G/DL (ref 33–36)
MCV RBC AUTO: 93.3 FL (ref 80–94)
MONOCYTES # BLD AUTO: 0.79 X10(3)/MCL (ref 0.1–1.3)
MONOCYTES NFR BLD AUTO: 9 %
NEUTROPHILS # BLD AUTO: 6.08 X10(3)/MCL (ref 2.1–9.2)
NEUTROPHILS NFR BLD AUTO: 69.5 %
NRBC BLD AUTO-RTO: 0 %
PLATELET # BLD AUTO: 259 X10(3)/MCL (ref 130–400)
PMV BLD AUTO: 10.7 FL (ref 7.4–10.4)
POTASSIUM SERPL-SCNC: 5.6 MMOL/L (ref 3.5–5.1)
PROT SERPL-MCNC: 6.5 GM/DL (ref 6.4–8.3)
RBC # BLD AUTO: 4.03 X10(6)/MCL (ref 4.7–6.1)
SODIUM SERPL-SCNC: 141 MMOL/L (ref 136–145)
WBC # SPEC AUTO: 8.75 X10(3)/MCL (ref 4.5–11.5)

## 2024-01-31 PROCEDURE — 36415 COLL VENOUS BLD VENIPUNCTURE: CPT

## 2024-01-31 PROCEDURE — 99213 OFFICE O/P EST LOW 20 MIN: CPT | Mod: ,,, | Performed by: SURGERY

## 2024-01-31 RX ORDER — SODIUM CHLORIDE, SODIUM LACTATE, POTASSIUM CHLORIDE, CALCIUM CHLORIDE 600; 310; 30; 20 MG/100ML; MG/100ML; MG/100ML; MG/100ML
INJECTION, SOLUTION INTRAVENOUS CONTINUOUS
Status: CANCELLED | OUTPATIENT
Start: 2024-01-31

## 2024-01-31 RX ORDER — CEFAZOLIN SODIUM 2 G/50ML
2 SOLUTION INTRAVENOUS
Status: CANCELLED | OUTPATIENT
Start: 2024-01-31

## 2024-02-01 PROCEDURE — 90966 ESRD HOME PT SERV P MO 20+: CPT | Mod: ,,, | Performed by: INTERNAL MEDICINE

## 2024-02-03 NOTE — PROGRESS NOTES
HISTORY & PHYSICAL  General Surgery    Patient Name: Nicholas Vera  YOB: 1965    Date: 02/03/2024                   SUBJECTIVE:     Chief Complaint/Reason for Admission: Pre-op Exam (Right inguinal hernia joint procedure )       History of Present Illness:  Mr. Nicholas Vera is a 58 y.o. male who reports he started noticing a bulge in his right groin.  It occasionally causes some pain and discomfort. He has a history of strenuous activity/lifting in the past.  Denies any changes to bowel / bladder habits. He denies CP/SOB or fever/chills.     Review of Systems:  12 point ROS negative except as stated in HPI    PAST HISTORY:     Past Medical History:   Diagnosis Date    Anemia     Anxiety     CAD (coronary artery disease)     CKD (chronic kidney disease) stage 4, GFR 15-29 ml/min     DDD (degenerative disc disease), cervical and lumbar s/p surgery     GERD (gastroesophageal reflux disease)     HTN (hypertension)     Hyperlipidemia     SHAYLEE on CPAP     PSVT (paroxysmal supraventricular tachycardia)     TIA (transient ischemic attack)     Type 2 diabetes mellitus      Past Surgical History:   Procedure Laterality Date    ANTERIOR CERVICAL DISCECTOMY W/ FUSION  11/26/2021    C5-6, C6-7 ACDF- Dr. Jett    AV FISTULA PLACEMENT      CARPAL TUNNEL RELEASE Bilateral     bilateral CTR & left UND- 2004 & 2020    COLONOSCOPY      CORONARY STENT PLACEMENT  2018    RCA    ELBOW SURGERY Bilateral     FUNCTIONAL ENDOSCOPIC SINUS SURGERY (FESS)      INGUINAL HERNIA REPAIR      INSERTION, STENT, ARTERY  11/2022    MICRODISCECTOMY OF SPINE  04/17/2015    L4-5. L5-S1 decompression; left L4-5 microdiscectomy- Dr. Amaro    MYRINGOTOMY W/ TUBES      NASAL SEPTOPLASTY      SINUS ENDOSCOPY      SPINE SURGERY  2015    TONSILLECTOMY, ADENOIDECTOMY      VASECTOMY       Family History   Problem Relation Age of Onset    Multiple sclerosis Daughter      Social History     Socioeconomic History    Marital status:  "   Tobacco Use    Smoking status: Former     Current packs/day: 2.00     Average packs/day: 2.0 packs/day for 37.0 years (74.0 ttl pk-yrs)     Types: Cigarettes, Vaping with nicotine    Smokeless tobacco: Never    Tobacco comments:     Quit smoking cigarettes 6 years ago.  Vaped for 3 years.  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    BASAGLAR KWIKPEN U-100 INSULIN glargine 100 units/mL (3mL) SubQ pen Inject 60 Units into the skin every evening.    BD ULTRA-FINE MINI PEN NEEDLE 31 gauge x 3/16" Ndle     blood sugar diagnostic, drum (ACCU-CHEK COMPACT PLUS TEST) Strp Accu-Chek Compact Plus Test Strips    calcitRIOL (ROCALTROL) 0.25 MCG Cap Take 0.25 mcg by mouth.    calcium acetate,phosphat bind, (PHOSLO) 667 mg capsule Take 1,334 mg by mouth 3 (three) times daily.    cholecalciferol, vitamin D3, 125 mcg (5,000 unit) Tab Take 5,000 Units by mouth once daily.    clopidogreL (PLAVIX) 75 mg tablet Take 75 mg by mouth once daily at 6am.    DEXCOM G6 SENSOR Savana SMARTSI Each Topical Every 10 Days    DEXCOM G6 TRANSMITTER Savana     diclofenac sodium (VOLTAREN) 1 % Gel Apply 2 g topically 4 (four) times daily.    dulaglutide (TRULICITY) 1.5 mg/0.5 mL pen injector Inject 1.5 mg into the skin.    EScitalopram oxalate (LEXAPRO) 10 MG tablet Take 10 mg by mouth once daily at 6am.    fenofibrate (TRICOR) 145 MG tablet Take 145 mg by mouth once daily at 6am.    gentamicin (GARAMYCIN) 0.1 % cream Apply topically.    insulin aspart U-100 (NOVOLOG) 100 unit/mL (3 mL) InPn pen Inject into the skin.    isosorbide mononitrate " "(IMDUR) 30 MG 24 hr tablet Take 30 mg by mouth once daily.    labetaloL (NORMODYNE) 200 MG tablet Take 100 mg by mouth 2 (two) times a day.    magnesium oxide 500 mg Tab Take 1 tablet by mouth.    multivit-mins/iron/folic/lycop (CENTRUM MEN ORAL) Take 1 tablet by mouth once daily at 6am.    NIFEdipine (ADALAT CC) 60 MG TbSR Take 60 mg by mouth.    nitroGLYCERIN (NITROSTAT) 0.4 MG SL tablet as needed.    NOVOLIN N FLEXPEN 100 unit/mL (3 mL) InPn Inject 15 Units into the skin.    pantoprazole (PROTONIX) 40 MG tablet Take 40 mg by mouth once daily at 6am.    rosuvastatin (CRESTOR) 20 MG tablet Take 20 mg by mouth once daily.    terazosin (HYTRIN) 2 MG capsule Take 2 mg by mouth 2 (two) times a day.    torsemide (DEMADEX) 20 MG Tab Take 40 mg by mouth once daily.    VASCEPA 1 gram Cap Take 2 tablets by mouth once daily.    XYOSTED 100 mg/0.5 mL AtIn Inject into the skin once a week. Q wednesday    gabapentin (NEURONTIN) 300 MG capsule Take 1 capsule (300 mg total) by mouth 3 (three) times daily.    sodium bicarbonate 650 MG tablet Take 2 tablets (1,300 mg total) by mouth 2 (two) times daily.     No current facility-administered medications on file prior to visit.     Review of patient's allergies indicates:   Allergen Reactions    Ticagrelor Shortness Of Breath       OBJECTIVE:     Vitals:    01/31/24 1451   BP: (!) 145/66   Pulse: 69   Weight: 116.1 kg (256 lb)   Height: 5' 8" (1.727 m)      Body mass index is 38.92 kg/m².     Physical Exam:  General:  Well developed, well nourished, no acute distress  HEENT:  Normocephalic, atraumatic, PERRL, EOMI, clear sclera, ears normal, neck supple, throat clear without erythema or exudates  CVS:  RRR, S1 and S2 normal, no murmurs, rubs, gallops  Resp:  Lungs clear to auscultation, no wheezes, rales, rhonchi, cough  GI:  Abdomen soft, non-tender, non-distended, normoactive bowel sounds, no masses,    :   R groin - Reducible Right Inguinal Hernia, nttp, descended testicle  L " groin - No hernia apprecaited nttp, descended testicle  MSK:  No muscle atrophy, cyanosis, peripheral edema, full range of motion  Skin:  No rashes, ulcers, erythema  Neuro:  CNII-XII grossly intact  Psych:  Alert and oriented to person, place, and time    Results:  I have independently reviewed all pertinent lab and radiologic studies relevant to general/bariatric surgery.    ASSESSMENT & PLAN:   Diagnoses:    ICD-10-CM ICD-9-CM   1. Right inguinal hernia  K40.90 550.90        Plan:  Open Right Inguinal Hernia Repair  Preoperative workup as ordered

## 2024-02-09 ENCOUNTER — TELEPHONE (OUTPATIENT)
Dept: SURGERY | Facility: CLINIC | Age: 59
End: 2024-02-09
Payer: COMMERCIAL

## 2024-02-09 NOTE — TELEPHONE ENCOUNTER
Dialysis center called regarding pts peritoneal dialysis. Pt is having a inguinal hernia repair and they are wanting to know how long he will have to do hemodialysis. Spoke with Dr Dougherty and he states 2 weeks. Called Ammy at dialysis center and informed of above.

## 2024-02-20 ENCOUNTER — TELEPHONE (OUTPATIENT)
Dept: SURGERY | Facility: CLINIC | Age: 59
End: 2024-02-20
Payer: COMMERCIAL

## 2024-02-20 NOTE — TELEPHONE ENCOUNTER
Received pts labs. Potassium 5.6. Discussed with Dr Dougherty and orders were placed for redraw the am of surgery.

## 2024-02-26 NOTE — DISCHARGE INSTRUCTIONS
Post Operative Inguinal Hernia Discharge Instructions      Laparoscopic Inguinal Hernia Repair, Adult, Care After  This sheet gives you information about how to care for yourself after your procedure. Your health care provider may also give you more specific instructions. If you have problems or questions, contact your health care provider.  What can I expect after the procedure?  After the procedure, it is common to have:   Pain.   Swelling and bruising around the incision area.   Scrotal swelling, in men.   Some fluid or blood draining from your incisions.  Follow these instructions at home:  Incision care   Follow instructions from your health care provider about how to take care of your incisions. Make sure you:  ? Wash your hands with soap and water before you change your bandage (dressing). If soap and water are not available, use hand .  ? Keep surgical gauze dressing on for 48 hours, then ok to remove gauze dressings and shower.   Ok to shower. Wash incisions with antibacterial soap and water for 2 weeks post op. Pat dry. Avoid tub baths/swimming for 2 weeks post op to ensure incisions have completely closed.   ? Leave stitches (sutures), skin glue, or adhesive strips in place. Do not remove adhesive strips or dermabond. It will come off within 1-2 weeks.    Check your incision area every day for signs of infection. Check for:  ? More redness, swelling, or pain.  ? More fluid or blood.  ? Warmth.  ? Pus or a bad smell.   Wear loose, soft clothing while your incisions heal.   Wear scrotal support (jock strap) provided to you by the hospital staff to prevent post op swelling. If scrotal support not available, please wear tight fitted briefs to limit scrotal swelling.   Scrotal support helps to limit swelling in the groin and scrotum in the initial post op period. Wear scrotal support for first 2 weeks post op if possible.   Driving   Do not drive or use heavy machinery while taking prescription pain  medicine.   Do not drive for 3 days after surgery or as long as you are taking prescription pain medicine.    Activity   Do not lift anything that is heavier than 20 lb (4.5 kg) for 4 weeks post op.    Ask your health care provider what activities are safe for you. A lot of activity during the first week after surgery can increase pain and swelling. For 1 week after your procedure:  ? Avoid activities that take a lot of effort, such as exercise or sports.  ? You may walk and climb stairs as needed for daily activity, but avoid long walks or climbing stairs for exercise.  Managing pain and swelling     Put ice on painful or swollen areas:  ? Put ice in a plastic bag.  ? Place a towel between your skin and the bag.  ? Leave the ice on for 20 minutes, 2-3 times a day.  General instructions   Take over-the-counter and prescription medicines only as told by your health care provider.   To prevent or treat constipation while you are taking prescription pain medicine, your health care provider may recommend that you:   ? Drink enough fluid to keep your urine pale yellow.  ? Take over-the-counter or prescription medicines.   ? Eat foods that are high in fiber, such as fresh fruits and vegetables, whole grains, and beans.   ? Limit foods that are high in fat and processed sugars, such as fried and sweet foods.   Do not use any products that contain nicotine or tobacco, such as cigarettes and e-cigarettes. If you need help quitting, ask your health care provider.   Drink enough fluid to keep your urine pale yellow.   Keep all follow-up visits as told by your health care provider. This is important.  Contact a health care provider if:   You have more redness, swelling, or pain around your incisions or your groin area.   You have more swelling in your scrotum.   You have more fluid or blood coming from your incisions.   Your incisions feel warm to the touch.   You have severe pain and medicines do not help.   You have abdominal  pain or swelling.   You cannot eat or drink without vomiting.   You cannot urinate or pass a bowel movement.   You faint.   You feel dizzy.   You have nausea and vomiting.   You have a fever.  Get help right away if:   You have pus or a bad smell coming from your incisions.   You have redness, warmth, or pain in your leg.   You have chest pain.   You have problems breathing.  Summary   Pain, swelling, and bruising are common after the procedure.   Check your incision area every day for signs of infection, such as more redness, swelling, or pain.   Put ice on painful or swollen areas for 20 minutes, 2-3 times a day.  This information is not intended to replace advice given to you by your health care provider. Make sure you discuss any questions you have with your health care provider.    How to Prevent Constipation After Surgery  Constipation is a common problem after surgery. Many things can make constipation more likely after a surgery, including:   Certain medicines, especially numbing medicines (anesthetics) and very strong pain medicines called opioids.   Feeling stressed because of the surgery.   Eating different foods than normal.   Being less active.  Symptoms of constipation include:   Having fewer than three bowel movements a week.   Straining to have a bowel movement.   Having hard, dry, or larger-than-normal stools (feces).   Discomfort in the lower abdomen, such as cramps or bloating.   Not feeling relief after having a bowel movement.   Nausea and vomiting.  You can take steps to help prevent constipation after surgery.  Follow these instructions at home:  Eating and drinking     Eat foods that have a lot of fiber in them, such as beans, bran, whole grains, and fresh fruits and vegetables.   Limit foods that are high in fat and processed sugars, such as fried or sweet foods. These include french fries, hamburgers, cookies, and candy.   Take a fiber supplement as told by your health care provider. If you  are not taking a fiber supplement and you think you are not getting enough fiber from foods, talk to your health care provider about adding a fiber supplement to your diet.   Drink enough fluid to keep your urine pale yellow.   Drink clear fluids, especially water. Avoid drinking alcohol, caffeine, and soda. These can make constipation worse.  Activity     After surgery, return to your normal activities slowly, or when your health care provider says it is okay.   Start walking as soon as you can. Try to go a little farther each day.   Once your health care provider approves, do some sort of regular exercise. This helps prevent constipation.  Bowel movements   Go to the restroom when you have the urge to go. Do not hold it in.   Try drinking something hot to get a bowel movement started.   Keep track of how often you use the restroom.  Medicines   Take over-the-counter and prescription medicines only as told by your health care provider.   Talk to your health care provider about medicines that may help prevent constipation, particularly if you have a history of constipation. Your health care provider may suggest a stool softener, laxative, or fiber supplement.   Do not take any medicines without talking to your health care provider first.  Contact a health care provider if:   You used stool softeners or laxatives and still have not had a bowel movement within 24-48 hours after using them.   You have not had a bowel movement in 3 days.   You have a fever.  Get help right away if you have:   Constipation that lasts for more than 4 days or if your symptoms get worse.   Bright red blood in your stool.   Pain in the abdomen or rectum.   Very bad cramping.   Thin, pencil-like stools.   Unexplained weight loss.  Summary   Constipation is a common problem after surgery. Many things can make constipation more likely after a surgery, including certain medicines, eating different foods than normal, and being less  active.   Symptoms of constipation include having fewer than three bowel movements a week, straining to have a bowel movement, and cramps or bloating in the lower abdomen.   To help prevent constipation, you should eat foods that are high in fiber, drink plenty of fluids, and get regular physical activity.   Your health care provider may suggest medicines, such as stool softeners or laxatives, to help prevent constipation.  This information is not intended to replace advice given to you by your health care provider. Make sure you discuss any questions you have with your health care provider.             Patient Education        Penile Prosthesis Insertion Discharge Instructions     What care is needed at home?   Make sure not to soak the wound or submerge it under water. Do not swim or soak in a bath or hot tub until your doctor says you may. Gently towel-dry the wound afterwards.  Wear supportive underwear while you heal.  Place an ice pack wrapped in a towel over the painful part to lessen pain and swelling. Never put ice right on the skin. Do not leave the ice on more than 10 to 15 minutes at a time.  Outer dressing can come off. Penis must remain in upright position.   You may take shower in 48 hours. Wash hands before and after touching dressing and incision. Wash incision with soap and water. Pat dry. Leave open to air. Do not pick off dressing or dermabond (skin glue). It will fall off on its own.   Do not lift anything over 10 pounds (4.5 kg).  Ask the doctor when you may go back to your normal activities like work, driving, or sex.    What follow-up care is needed?   Be sure to keep your visits. You will not be able to use the device until your doctor says it is okay. Most often, this is about 6 weeks after surgery.    What lifestyle changes are needed?   Avoid tiring activities or sports for 2 to 3 weeks after the procedure.  Do not start very physical activity for about 6 weeks after the surgery. Ask your  doctor about when you can return to working out.  You will need to wait for a while to have sex after your surgery. Ask your doctor when you can have sex.  Avoid smoking and drinking beer, wine, and mixed drinks (alcohol).  Your doctor will talk about ways of hiding the semirigid device in clothing.  Your doctor will talk with you and your partner on how to pump up and deflate your inflatable penis.    What problems could happen?   Bleeding  Infection  Device fails  Damage to the urethra or the bladder or the bowels  Bruising in the penis and scrotum  Device moves and comes out of the skin or urethra    When do I need to call the doctor?   Signs of wound infection such as a fever of 100.4°F (38°C) or higher, chills, swelling, redness, warmth around the wound; too much pain when touched; yellowish, greenish, or bloody discharge; foul smell coming from the cut site; cut site opens up.   Problems passing urine  Very bad pain or bleeding at the cut site

## 2024-02-27 ENCOUNTER — OUTSIDE PLACE OF SERVICE (OUTPATIENT)
Dept: NEPHROLOGY | Facility: CLINIC | Age: 59
End: 2024-02-27
Payer: COMMERCIAL

## 2024-02-27 ENCOUNTER — ANESTHESIA EVENT (OUTPATIENT)
Dept: SURGERY | Facility: HOSPITAL | Age: 59
End: 2024-02-27
Payer: COMMERCIAL

## 2024-02-27 NOTE — ANESTHESIA PREPROCEDURE EVALUATION
"                                                                 2024 for 2024  Nicholas Vera is a 58 y.o., male.    Pre-op Diagnosis: Right inguinal hernia [K40.90]    Procedure(s):  REPAIR, HERNIA, INGUINAL Right   open  INSERTION, PENILE PROSTHESIS     Review of patient's allergies indicates:   Allergen Reactions    Ticagrelor Shortness Of Breath       Current Outpatient Medications   Medication Instructions    aspirin 81 MG Chew 1 tablet, Oral, Every morning    BASAGLAR KWIKPEN U-100 INSULIN 60 Units, Subcutaneous, Nightly    BD ULTRA-FINE MINI PEN NEEDLE 31 gauge x 3/16" Ndle No dose, route, or frequency recorded.    blood sugar diagnostic, drum (ACCU-CHEK COMPACT PLUS TEST) Strp Accu-Chek Compact Plus Test Strips    calcitRIOL (ROCALTROL) 0.5 mcg, Oral, Daily    calcium acetate(phosphat bind) (PHOSLO) 1,334 mg, Oral, 3 times daily    cholecalciferol (vitamin D3) 5,000 Units, Oral, Daily    clopidogreL (PLAVIX) 75 mg, Oral, Daily    DEXCOM G6 SENSOR Savana SMARTSI Each Topical Every 10 Days    DEXCOM G6 TRANSMITTER Savana No dose, route, or frequency recorded.    EScitalopram oxalate (LEXAPRO) 10 mg, Oral, Daily    fenofibrate (TRICOR) 145 mg, Oral, Daily    gentamicin (GARAMYCIN) 0.1 % cream Topical (Top)    insulin aspart U-100 (NOVOLOG) 100 unit/mL (3 mL) InPn pen Subcutaneous, Sliding scale    isosorbide mononitrate (IMDUR) 30 mg, Oral, Daily    labetaloL (NORMODYNE) 300 mg, Oral, 2 times daily    magnesium oxide 500 mg Tab 1 tablet, Oral, 400 mg BID    multivit-mins/iron/folic/lycop (CENTRUM MEN ORAL) 1 tablet, Oral, Daily    NIFEdipine (ADALAT CC) 60 mg, Oral    nitroGLYCERIN (NITROSTAT) 0.4 MG SL tablet As needed (PRN)    NovoLIN N FlexPen 20 Units, Subcutaneous, Before breakfast    pantoprazole (PROTONIX) 40 mg, Oral, Daily    rosuvastatin (CRESTOR) 20 mg, Oral, Daily    sodium bicarbonate 1,300 mg, Oral, 2 times daily    terazosin (HYTRIN) 2 mg, Oral, 2 times daily    torsemide " (DEMADEX) 40 mg, Oral, Daily    TRULICITY 1.5 mg, Subcutaneous    VASCEPA 1 gram Cap 2 tablets, Oral, 2 times daily    XYOSTED 100 mg/0.5 mL AtIn Subcutaneous, Weekly, Q wednesday       REPAIR, HERNIA, INGUINAL Right   open;INSERTION, PENILE PRO*    Past Medical History:   Diagnosis Date    Anemia     Anxiety     CAD (coronary artery disease)     CKD (chronic kidney disease) stage 4, GFR 15-29 ml/min     DDD (degenerative disc disease), cervical and lumbar s/p surgery     GERD (gastroesophageal reflux disease)     HTN (hypertension)     Hyperlipidemia     Obesity, unspecified     SHAYLEE on CPAP     PSVT (paroxysmal supraventricular tachycardia)     TIA (transient ischemic attack)     Type 2 diabetes mellitus    PMH includes CVA 2015 (multiple CVA scars by Scan), TIA since that time; CAD, Coronary Stent x 2; last Dialysis yesterday.  Ex smoker/vaper - quit 5 yrs ago.    Past Surgical History:   Procedure Laterality Date    ANTERIOR CERVICAL DISCECTOMY W/ FUSION  11/26/2021    C5-6, C6-7 ACDF- Dr. Jett    AV FISTULA PLACEMENT      CARPAL TUNNEL RELEASE Bilateral     bilateral CTR & left UND- 2004 & 2020    COLONOSCOPY      CORONARY STENT PLACEMENT  2018    RCA    ELBOW SURGERY Bilateral     FUNCTIONAL ENDOSCOPIC SINUS SURGERY (FESS)      INGUINAL HERNIA REPAIR      INSERTION, STENT, ARTERY  11/2022    MICRODISCECTOMY OF SPINE  04/17/2015    L4-5. L5-S1 decompression; left L4-5 microdiscectomy- Dr. Amaro    MYRINGOTOMY W/ TUBES      NASAL SEPTOPLASTY      SINUS ENDOSCOPY      SPINE SURGERY  2015    TONSILLECTOMY, ADENOIDECTOMY      VASECTOMY       Lab Results   Component Value Date    WBC 8.75 01/31/2024    HGB 11.8 (L) 01/31/2024    HCT 37.6 (L) 01/31/2024    MCV 93.3 01/31/2024     01/31/2024   BMP  Lab Results   Component Value Date     01/31/2024    K 5.6 (H) 01/31/2024     12/29/2022    CO2 24 01/31/2024    BUN 42.7 (H) 01/31/2024    CREATININE 6.62 (H) 01/31/2024    CALCIUM 9.0 01/31/2024     ANIONGAP 12 12/29/2022    ESTGFRAFRICA 9 12/29/2022    EGFRNONAA 15 07/13/2022     Istat 12/29/2024 1141    K+ 4.4   133/4.4/96/26//76/9.9<145       ECG 1/11/2024 SR LATE TRANSITION POSSIBLE AWMI scar (indeterminate age)  TTE 08/16/22   Echo    Interpretation Summary  · The estimated ejection fraction is 55-60%.  · Mild tricuspid regurgitation.  · There is no pulmonary hypertension.  · The estimated PA systolic pressure is 31 mmHg.  · Small pericardial effusion.     CARDIAC CATH 12/29/2022   Coronary angiogram findings:   1. Coronary dominance:  right      2. Left Main:  large, normal   3. Left Anterior Descending:  moderate sided, patient LAD stent. Moderate   diease in jailed D2 with good flow   4. Left Circumflex:  moderate sized, 30-40% disease in OM2 otherwise   nonobstructive   5. Right: Dominant.  Moderate sized. Non obstructive disease.     Hemodynamic Findings: LVEDP:  20      QUICK CARDIAC CLEARANCE AT MODERATE RISK    Pre-op Assessment    I have reviewed the Patient Summary Reports.    I have reviewed the NPO Status.   I have reviewed the Medications.     Review of Systems  Anesthesia Hx:  No problems with previous Anesthesia             Denies Family Hx of Anesthesia complications.    Denies Personal Hx of Anesthesia complications.                    Social:  Non-Smoker       Hematology/Oncology:       -- Anemia:                                  Cardiovascular:  Exercise tolerance: good   Hypertension   CAD   CABG/stent Dysrhythmias (PSVT)   Denies Angina.   Denies Orthopnea.  Denies PND.  hyperlipidemia  Denies JESSICA.    Functional Capacity good / => 4 METS                         Pulmonary:        Sleep Apnea, CPAP                Renal/:  Chronic Renal Disease, CKD                Hepatic/GI:     GERD             Musculoskeletal:  Arthritis               Neurological:  TIA,  Denies CVA.                                    Endocrine:  Diabetes, type 2           Psych:   anxiety                 Physical  Exam  General: Well nourished, Alert and Oriented    Airway:  Mallampati: III   Mouth Opening: Normal  TM Distance: Normal  Tongue: Normal  Neck ROM: Normal ROM    Dental:  Intact    Chest/Lungs:  Clear to auscultation    Heart:  Rate: Normal  Rhythm: Regular Rhythm  No pretibial edema  No carotid bruits      Anesthesia Plan  Type of Anesthesia, risks & benefits discussed:    Anesthesia Type: Gen ETT  Intra-op Monitoring Plan: Standard ASA Monitors  Post Op Pain Control Plan: multimodal analgesia  Induction:  IV  Airway Plan: Direct, Post-Induction  Informed Consent: Informed consent signed with the Patient and all parties understand the risks and agree with anesthesia plan.  All questions answered. Patient consented to blood products? Yes  ASA Score: 4  Day of Surgery Review of History & Physical: H&P Update referred to the surgeon/provider.    Ready For Surgery From Anesthesia Perspective.     .

## 2024-02-28 RX ORDER — SODIUM CHLORIDE, SODIUM LACTATE, POTASSIUM CHLORIDE, CALCIUM CHLORIDE 600; 310; 30; 20 MG/100ML; MG/100ML; MG/100ML; MG/100ML
INJECTION, SOLUTION INTRAVENOUS CONTINUOUS
Status: CANCELLED | OUTPATIENT
Start: 2024-02-28

## 2024-02-28 RX ORDER — CEFAZOLIN SODIUM 2 G/50ML
2 SOLUTION INTRAVENOUS
Status: CANCELLED | OUTPATIENT
Start: 2024-02-28

## 2024-02-29 ENCOUNTER — ANESTHESIA (OUTPATIENT)
Dept: SURGERY | Facility: HOSPITAL | Age: 59
End: 2024-02-29
Payer: COMMERCIAL

## 2024-02-29 ENCOUNTER — HOSPITAL ENCOUNTER (OUTPATIENT)
Facility: HOSPITAL | Age: 59
Discharge: HOME | End: 2024-02-29
Attending: SURGERY | Admitting: SURGERY
Payer: COMMERCIAL

## 2024-02-29 DIAGNOSIS — K40.90 RIGHT INGUINAL HERNIA: Primary | ICD-10-CM

## 2024-02-29 LAB
ANION GAP SERPL CALC-SCNC: 15 MMOL/L (ref 8–16)
BUN SERPL-MCNC: 76 MG/DL (ref 6–30)
CHLORIDE SERPL-SCNC: 96 MMOL/L (ref 95–110)
CREAT SERPL-MCNC: 9.9 MG/DL (ref 0.5–1.4)
GLUCOSE SERPL-MCNC: 145 MG/DL (ref 70–110)
HCT VFR BLD CALC: 38 %PCV (ref 36–54)
HGB BLD-MCNC: 13 G/DL
POC IONIZED CALCIUM: 0.92 MMOL/L (ref 1.06–1.42)
POC TCO2 (MEASURED): 26 MMOL/L (ref 23–29)
POCT GLUCOSE: 134 MG/DL (ref 70–110)
POTASSIUM BLD-SCNC: 4.4 MMOL/L (ref 3.5–5.1)
SAMPLE: ABNORMAL
SODIUM BLD-SCNC: 133 MMOL/L (ref 136–145)

## 2024-02-29 PROCEDURE — 37000008 HC ANESTHESIA 1ST 15 MINUTES: Performed by: SURGERY

## 2024-02-29 PROCEDURE — 63600175 PHARM REV CODE 636 W HCPCS: Performed by: SURGERY

## 2024-02-29 PROCEDURE — 63600175 PHARM REV CODE 636 W HCPCS: Performed by: UROLOGY

## 2024-02-29 PROCEDURE — 63600175 PHARM REV CODE 636 W HCPCS: Performed by: NURSE ANESTHETIST, CERTIFIED REGISTERED

## 2024-02-29 PROCEDURE — 25000003 PHARM REV CODE 250: Performed by: UROLOGY

## 2024-02-29 PROCEDURE — 36000706: Performed by: SURGERY

## 2024-02-29 PROCEDURE — 71000015 HC POSTOP RECOV 1ST HR: Performed by: SURGERY

## 2024-02-29 PROCEDURE — 63600175 PHARM REV CODE 636 W HCPCS

## 2024-02-29 PROCEDURE — 25000003 PHARM REV CODE 250: Performed by: NURSE ANESTHETIST, CERTIFIED REGISTERED

## 2024-02-29 PROCEDURE — 49505 PRP I/HERN INIT REDUC >5 YR: CPT | Mod: AS,RT,,

## 2024-02-29 PROCEDURE — D9220A PRA ANESTHESIA: Mod: CRNA,,, | Performed by: NURSE ANESTHETIST, CERTIFIED REGISTERED

## 2024-02-29 PROCEDURE — D9220A PRA ANESTHESIA: Mod: ANES,,, | Performed by: ANESTHESIOLOGY

## 2024-02-29 PROCEDURE — C1813 PROSTHESIS, PENILE, INFLATAB: HCPCS | Performed by: SURGERY

## 2024-02-29 PROCEDURE — C1729 CATH, DRAINAGE: HCPCS | Performed by: SURGERY

## 2024-02-29 PROCEDURE — 71000016 HC POSTOP RECOV ADDL HR: Performed by: SURGERY

## 2024-02-29 PROCEDURE — 36000707: Performed by: SURGERY

## 2024-02-29 PROCEDURE — C1889 IMPLANT/INSERT DEVICE, NOC: HCPCS | Performed by: SURGERY

## 2024-02-29 PROCEDURE — 63600175 PHARM REV CODE 636 W HCPCS: Performed by: ANESTHESIOLOGY

## 2024-02-29 PROCEDURE — 37000009 HC ANESTHESIA EA ADD 15 MINS: Performed by: SURGERY

## 2024-02-29 PROCEDURE — C1781 MESH (IMPLANTABLE): HCPCS | Performed by: SURGERY

## 2024-02-29 PROCEDURE — 71000033 HC RECOVERY, INTIAL HOUR: Performed by: SURGERY

## 2024-02-29 PROCEDURE — 49505 PRP I/HERN INIT REDUC >5 YR: CPT | Mod: RT,,, | Performed by: SURGERY

## 2024-02-29 PROCEDURE — 25000003 PHARM REV CODE 250: Performed by: SURGERY

## 2024-02-29 DEVICE — MESH PROLENE HERNIA EXTENDED: Type: IMPLANTABLE DEVICE | Site: INGUINAL | Status: FUNCTIONAL

## 2024-02-29 DEVICE — SCROTAL ZERO DEGREE ANGLE CYLINDER SET WITH PUMP
Type: IMPLANTABLE DEVICE | Site: PENIS | Status: FUNCTIONAL
Brand: TITAN

## 2024-02-29 DEVICE — CL RESERVOIR
Type: IMPLANTABLE DEVICE | Site: GROIN | Status: FUNCTIONAL
Brand: TITAN

## 2024-02-29 DEVICE — KIT PENILE ASSEMBLY: Type: IMPLANTABLE DEVICE | Site: PENIS | Status: FUNCTIONAL

## 2024-02-29 RX ORDER — ONDANSETRON HYDROCHLORIDE 2 MG/ML
INJECTION, SOLUTION INTRAMUSCULAR; INTRAVENOUS
Status: DISCONTINUED | OUTPATIENT
Start: 2024-02-29 | End: 2024-02-29

## 2024-02-29 RX ORDER — HYDROMORPHONE HYDROCHLORIDE 2 MG/ML
0.2 INJECTION, SOLUTION INTRAMUSCULAR; INTRAVENOUS; SUBCUTANEOUS EVERY 5 MIN PRN
Status: DISCONTINUED | OUTPATIENT
Start: 2024-02-29 | End: 2024-02-29

## 2024-02-29 RX ORDER — MIDAZOLAM HYDROCHLORIDE 1 MG/ML
INJECTION INTRAMUSCULAR; INTRAVENOUS
Status: COMPLETED
Start: 2024-02-29 | End: 2024-02-29

## 2024-02-29 RX ORDER — PROCHLORPERAZINE EDISYLATE 5 MG/ML
5 INJECTION INTRAMUSCULAR; INTRAVENOUS EVERY 30 MIN PRN
Status: DISCONTINUED | OUTPATIENT
Start: 2024-02-29 | End: 2024-02-29 | Stop reason: HOSPADM

## 2024-02-29 RX ORDER — GENTAMICIN 40 MG/ML
INJECTION, SOLUTION INTRAMUSCULAR; INTRAVENOUS
Status: DISCONTINUED
Start: 2024-02-29 | End: 2024-02-29 | Stop reason: HOSPADM

## 2024-02-29 RX ORDER — ROCURONIUM BROMIDE 10 MG/ML
INJECTION, SOLUTION INTRAVENOUS
Status: DISCONTINUED | OUTPATIENT
Start: 2024-02-29 | End: 2024-02-29

## 2024-02-29 RX ORDER — PROPOFOL 10 MG/ML
VIAL (ML) INTRAVENOUS
Status: DISCONTINUED | OUTPATIENT
Start: 2024-02-29 | End: 2024-02-29

## 2024-02-29 RX ORDER — OXYCODONE AND ACETAMINOPHEN 10; 325 MG/1; MG/1
1 TABLET ORAL EVERY 6 HOURS PRN
Qty: 28 TABLET | Refills: 0 | Status: SHIPPED | OUTPATIENT
Start: 2024-02-29

## 2024-02-29 RX ORDER — SODIUM CHLORIDE 0.9 % (FLUSH) 0.9 %
10 SYRINGE (ML) INJECTION
Status: DISCONTINUED | OUTPATIENT
Start: 2024-02-29 | End: 2024-02-29 | Stop reason: HOSPADM

## 2024-02-29 RX ORDER — RIFAMPIN 600 MG/10ML
INJECTION, POWDER, LYOPHILIZED, FOR SOLUTION INTRAVENOUS
Status: DISCONTINUED | OUTPATIENT
Start: 2024-02-29 | End: 2024-02-29 | Stop reason: HOSPADM

## 2024-02-29 RX ORDER — ONDANSETRON HYDROCHLORIDE 2 MG/ML
4 INJECTION, SOLUTION INTRAVENOUS EVERY 4 HOURS PRN
Status: DISCONTINUED | OUTPATIENT
Start: 2024-02-29 | End: 2024-02-29 | Stop reason: HOSPADM

## 2024-02-29 RX ORDER — CEFTRIAXONE 1 G/1
1 INJECTION, POWDER, FOR SOLUTION INTRAMUSCULAR; INTRAVENOUS
Status: DISCONTINUED | OUTPATIENT
Start: 2024-02-29 | End: 2024-02-29 | Stop reason: HOSPADM

## 2024-02-29 RX ORDER — PHENYLEPHRINE HYDROCHLORIDE 10 MG/ML
INJECTION INTRAVENOUS
Status: DISCONTINUED | OUTPATIENT
Start: 2024-02-29 | End: 2024-02-29

## 2024-02-29 RX ORDER — ENOXAPARIN SODIUM 100 MG/ML
40 INJECTION SUBCUTANEOUS
Status: DISPENSED | OUTPATIENT
Start: 2024-02-29

## 2024-02-29 RX ORDER — CEFAZOLIN SODIUM 1 G/3ML
2 INJECTION, POWDER, FOR SOLUTION INTRAMUSCULAR; INTRAVENOUS
Status: DISCONTINUED | OUTPATIENT
Start: 2024-02-29 | End: 2024-02-29

## 2024-02-29 RX ORDER — OXYCODONE AND ACETAMINOPHEN 10; 325 MG/1; MG/1
1 TABLET ORAL EVERY 6 HOURS PRN
Qty: 28 TABLET | Refills: 0 | Status: SHIPPED | OUTPATIENT
Start: 2024-02-29 | End: 2024-02-29

## 2024-02-29 RX ORDER — LEVOFLOXACIN 500 MG/1
500 TABLET, FILM COATED ORAL DAILY
Qty: 7 TABLET | Refills: 0 | Status: SHIPPED | OUTPATIENT
Start: 2024-02-29

## 2024-02-29 RX ORDER — DEXAMETHASONE SODIUM PHOSPHATE 4 MG/ML
INJECTION, SOLUTION INTRA-ARTICULAR; INTRALESIONAL; INTRAMUSCULAR; INTRAVENOUS; SOFT TISSUE
Status: DISCONTINUED | OUTPATIENT
Start: 2024-02-29 | End: 2024-02-29

## 2024-02-29 RX ORDER — CEFAZOLIN SODIUM 1 G/3ML
INJECTION, POWDER, FOR SOLUTION INTRAMUSCULAR; INTRAVENOUS
Status: DISCONTINUED
Start: 2024-02-29 | End: 2024-02-29 | Stop reason: HOSPADM

## 2024-02-29 RX ORDER — HYDROCODONE BITARTRATE AND ACETAMINOPHEN 7.5; 325 MG/1; MG/1
1 TABLET ORAL EVERY 6 HOURS PRN
Status: DISCONTINUED | OUTPATIENT
Start: 2024-02-29 | End: 2024-02-29 | Stop reason: HOSPADM

## 2024-02-29 RX ORDER — MEPERIDINE HYDROCHLORIDE 25 MG/ML
50 INJECTION INTRAMUSCULAR; INTRAVENOUS; SUBCUTANEOUS
Status: DISCONTINUED | OUTPATIENT
Start: 2024-02-29 | End: 2024-02-29 | Stop reason: HOSPADM

## 2024-02-29 RX ORDER — TRAMADOL HYDROCHLORIDE 50 MG/1
50 TABLET ORAL EVERY 4 HOURS PRN
Status: DISCONTINUED | OUTPATIENT
Start: 2024-02-29 | End: 2024-02-29 | Stop reason: HOSPADM

## 2024-02-29 RX ORDER — SODIUM CHLORIDE 9 MG/ML
0-999 INJECTION, SOLUTION INTRAVENOUS CONTINUOUS
Status: DISCONTINUED | OUTPATIENT
Start: 2024-02-29 | End: 2024-02-29 | Stop reason: HOSPADM

## 2024-02-29 RX ORDER — RIFAMPIN 600 MG/10ML
INJECTION, POWDER, LYOPHILIZED, FOR SOLUTION INTRAVENOUS
Status: DISCONTINUED
Start: 2024-02-29 | End: 2024-02-29 | Stop reason: HOSPADM

## 2024-02-29 RX ORDER — CEFAZOLIN SODIUM 1 G/3ML
INJECTION, POWDER, FOR SOLUTION INTRAMUSCULAR; INTRAVENOUS
Status: DISCONTINUED | OUTPATIENT
Start: 2024-02-29 | End: 2024-02-29 | Stop reason: HOSPADM

## 2024-02-29 RX ORDER — BUPIVACAINE HYDROCHLORIDE AND EPINEPHRINE 2.5; 5 MG/ML; UG/ML
INJECTION, SOLUTION EPIDURAL; INFILTRATION; INTRACAUDAL; PERINEURAL
Status: DISCONTINUED | OUTPATIENT
Start: 2024-02-29 | End: 2024-02-29 | Stop reason: HOSPADM

## 2024-02-29 RX ORDER — EPHEDRINE SULFATE 50 MG/ML
INJECTION, SOLUTION INTRAVENOUS
Status: DISCONTINUED | OUTPATIENT
Start: 2024-02-29 | End: 2024-02-29

## 2024-02-29 RX ORDER — VANCOMYCIN HYDROCHLORIDE 1 G/20ML
1000 INJECTION, POWDER, LYOPHILIZED, FOR SOLUTION INTRAVENOUS
Status: DISCONTINUED | OUTPATIENT
Start: 2024-02-29 | End: 2024-02-29 | Stop reason: HOSPADM

## 2024-02-29 RX ORDER — GENTAMICIN 40 MG/ML
INJECTION, SOLUTION INTRAMUSCULAR; INTRAVENOUS
Status: DISCONTINUED | OUTPATIENT
Start: 2024-02-29 | End: 2024-02-29 | Stop reason: HOSPADM

## 2024-02-29 RX ORDER — FENTANYL CITRATE 50 UG/ML
INJECTION, SOLUTION INTRAMUSCULAR; INTRAVENOUS
Status: DISCONTINUED | OUTPATIENT
Start: 2024-02-29 | End: 2024-02-29

## 2024-02-29 RX ORDER — HYDROMORPHONE HYDROCHLORIDE 2 MG/ML
0.4 INJECTION, SOLUTION INTRAMUSCULAR; INTRAVENOUS; SUBCUTANEOUS EVERY 5 MIN PRN
Status: DISCONTINUED | OUTPATIENT
Start: 2024-02-29 | End: 2024-02-29 | Stop reason: HOSPADM

## 2024-02-29 RX ADMIN — EPHEDRINE SULFATE 15 MG: 50 INJECTION INTRAVENOUS at 03:02

## 2024-02-29 RX ADMIN — ENOXAPARIN SODIUM 40 MG: 40 INJECTION SUBCUTANEOUS at 12:02

## 2024-02-29 RX ADMIN — FENTANYL CITRATE 50 MCG: 50 INJECTION, SOLUTION INTRAMUSCULAR; INTRAVENOUS at 02:02

## 2024-02-29 RX ADMIN — PHENYLEPHRINE HYDROCHLORIDE 50 MCG: 10 INJECTION INTRAVENOUS at 03:02

## 2024-02-29 RX ADMIN — PROPOFOL 25 MG: 10 INJECTION, EMULSION INTRAVENOUS at 02:02

## 2024-02-29 RX ADMIN — HYDROMORPHONE HYDROCHLORIDE 0.4 MG: 2 INJECTION INTRAMUSCULAR; INTRAVENOUS; SUBCUTANEOUS at 04:02

## 2024-02-29 RX ADMIN — SUGAMMADEX 200 MG: 100 INJECTION, SOLUTION INTRAVENOUS at 03:02

## 2024-02-29 RX ADMIN — DEXAMETHASONE SODIUM PHOSPHATE 4 MG: 4 INJECTION, SOLUTION INTRA-ARTICULAR; INTRALESIONAL; INTRAMUSCULAR; INTRAVENOUS; SOFT TISSUE at 01:02

## 2024-02-29 RX ADMIN — SODIUM CHLORIDE 75 ML/HR: 9 INJECTION, SOLUTION INTRAVENOUS at 12:02

## 2024-02-29 RX ADMIN — EPHEDRINE SULFATE 10 MG: 50 INJECTION INTRAVENOUS at 02:02

## 2024-02-29 RX ADMIN — PROPOFOL 175 MG: 10 INJECTION, EMULSION INTRAVENOUS at 01:02

## 2024-02-29 RX ADMIN — CEFTRIAXONE SODIUM 1 G: 1 INJECTION, POWDER, FOR SOLUTION INTRAMUSCULAR; INTRAVENOUS at 01:02

## 2024-02-29 RX ADMIN — FENTANYL CITRATE 50 MCG: 50 INJECTION, SOLUTION INTRAMUSCULAR; INTRAVENOUS at 01:02

## 2024-02-29 RX ADMIN — VANCOMYCIN HYDROCHLORIDE 1000 MG: 1 INJECTION, POWDER, LYOPHILIZED, FOR SOLUTION INTRAVENOUS at 12:02

## 2024-02-29 RX ADMIN — HYDROMORPHONE HYDROCHLORIDE 0.4 MG: 2 INJECTION INTRAMUSCULAR; INTRAVENOUS; SUBCUTANEOUS at 03:02

## 2024-02-29 RX ADMIN — EPHEDRINE SULFATE 15 MG: 50 INJECTION INTRAVENOUS at 02:02

## 2024-02-29 RX ADMIN — MIDAZOLAM HYDROCHLORIDE 2 MG: 1 INJECTION, SOLUTION INTRAMUSCULAR; INTRAVENOUS at 12:02

## 2024-02-29 RX ADMIN — ONDANSETRON 4 MG: 2 INJECTION INTRAMUSCULAR; INTRAVENOUS at 01:02

## 2024-02-29 RX ADMIN — ROCURONIUM BROMIDE 50 MG: 50 INJECTION INTRAVENOUS at 01:02

## 2024-02-29 NOTE — OP NOTE
Nicholas Vera   1965   [id number]     Date of Procedure: 2/29/2024              PreOP Dx:  Pre-Op Diagnosis Codes:     * Right inguinal hernia [K40.90]     Erectile dysfunction    Post Op Dx: Post-Op Diagnosis Codes:     * Right inguinal hernia [K40.90]       Erectile dysfunction    Procedure:      Inflatable penile prosthesis with Coloplast Titan device    Surgeon:  Sam Ibarra MD      EBL:  10 cc    Findings:  Corpora measurements were 6 cm distally and 11 cm proximally for total length of 17 cm.  I chose 18 cm cylinders and cut 1 cm off the rear tips and used a 75 cc reservoir      Procedure:       After informed consent was obtained, the patient was taken to the operating room after receiving a preop dose of antibiotics.  He was given a general anesthetic and placed in the frog-leg supine position.  His abdomen genitals perineum and lower extremities above the knees were all prepped and draped sterilely.  I placed a 16 [place] Latham catheter through the urethra and I placed a Ubaldo deep scrotal retractor to place tension on the penis.  I used a 15. Blade to make a horizontal penoscrotal incision I divided the scrotal layers down to the corporal bodies as well as to expose the urethra to prevent any injury.  I placed stay sutures on each side of the midline in the corpora at the level of the penoscrotal junctions.  Using a 15 blade, I made corporotomies on both sides and I made a 1st pass into the distal corpora with curved Metzenbaum scissor.  I dilated the corpora with the measuring device to measure and dilate at the same time.  His distal corpora measured 6 cm and proximally measured 11 cm for a total of 17 cm.  I chose 18 cm Titan cylinders that were appropriately prepped  on the back table and soaked in concentrated rifampin and gentamicin.  I cut 1 cm off the rear tips.  I used the Diana  to pass the needle and strings through the distal corpora and glans on both sides.  I placed  the proximal ends into position and pulled the distal ends into position with the strings.  I used my previously placed stay sutures to close the corporotomies.  Just prior to closing the corporotomies, I irrigated the corpora with Irrisept.  Using a 15 blade, I made 3-4 cm horizontal incision just above the pubic bone in the patient's left side.  I divided the abdominal layers down to the rectus fascia.  I opened up the rectus fascia and I made a pocket underneath the rectus fascia with long curved nasal speculum.  I prepped a 75 cc reservoir on the back table and soaked it in concentrated rifampin and gentamicin.  I placed the reservoir underneath the rectus fascia and filled it with 75 cc of isotonic saline.  I brought the tubing out below the incision in the rectus fascia and brought it down into the scrotum.  I closed the rectus fascia with a running 2-0 Vicryl suture followed by the skin with a running 4-0 Monocryl subcuticular stitch.  Using the quick connectors, I connected the reservoir to the pump which was placed in the dependent portion of the scrotum.  I washed out the scrotum with 500 mL of Irrisept I closed the scrotum with a running 2-0 Vicryl suture for the dartos fascia followed by running 4-0 Monocryl subcuticular stitch for the skin.  I placed Dermabond glue over both the my incisions.  He tolerated the procedure well estimated blood loss was 10 cc there were no apparent complications he was extubated and brought to recovery in good condition

## 2024-02-29 NOTE — OP NOTE
PATIENT:  Nicholas Vera      : 1965       DATE OF SURGERY:   2024          SURGEON:  Donovan Dougherty MD         ASSISTANT:  Shoshana Nicole NP (First Assistant)          PREOPERATIVE DIAGNOSIS:  Right Inguinal Hernia           POST OPERATIVE DIAGNOSIS:  Same.           PROCEDURE:  Open Right Inguinal Hernia           ANESTHESIA:  General endotracheal anesthesia.           ESTIMATED BLOOD LOSS:  Approximately 20 cc.   Blood administered, none.           LAP AND INSTRUMENT COUNT:  Correct X2 at the end of the case.           SPECIMENS:  none          INDICATION AND SIGNIFICANT HISTORY:  Patient is a 58 y.o.-year-old male complaining of buldge in his Right groin for the past few weeks.  Patient was worked up clincally and found to have a Right inguinal hernia.  Risks and benefits of surgery was discussed with the patient who voiced understanding of risks / benefits and elected to proceed with surgery.                   PROCEDURE IN DETAIL:  Once informed consents were obtained, the patient was taken to the OR and placed supine on the OR table.  After general endotracheal anesthesia was induced the abdomen was prepped and draped in the standard surgical fashion.  An incision was made in line with the inguinal ligament in the Right lower quadrant.  Dissection was carried out through Kg's fascia to the level of the external oblique aponeurosis.  This was then opened in the line of its fibers sharply and retracted laterally.  The cord and cord structures were dissected off the pelvic floor and wrapped with a Penrose drain for retraction.  The ilioinguinal nerve was dissected off the cord and retracted out of the operative area.  Attention was then redirected to the anterior medial portion of the sac which was dissected and a direct hernia sac was found.  The patient had a large inguinal hernia sac with chronic scarring to all surrounding tissue.  Care was taken to slowly and easily dissect all the  surrounding structures including the vas deferens off the hernia sac from the cord.  The hernia sac was easily reducible.  The wound was then copiously irrigated and dried.  The patient also had a moderate degree of laxity in the pelvic floor, a prolene hernia system mesh was placed, secured to the pubic tubercle and inguinal ligament laterally, and shelving border medially.  External oblique aponeurosis was closed with a 2-0 Vicryl suture in a running fashion.  Kg's fascia was closed with 3-0 Vicryl suture in a running subcuticular fashion.  The skin was then closed with a 4-0 Vicryl suture in a subcuticular fashion.  The skin was clean and dry and a dry sterile dressing was placed on the wound.  Lap and instrument counts were correct times two at the end of the case.  The patient tolerated the procedure well and was transported to the Recovery Room in good condition.

## 2024-02-29 NOTE — TRANSFER OF CARE
"Anesthesia Transfer of Care Note    Patient: Nicholas Vera    Procedure(s) Performed: Procedure(s) (LRB):  REPAIR, HERNIA, INGUINAL Right   open (Right)  INSERTION, PENILE PROSTHESIS (N/A)    Patient location: PACU    Anesthesia Type: general    Transport from OR: Transported from OR on room air with adequate spontaneous ventilation    Post pain: adequate analgesia    Post assessment: no apparent anesthetic complications    Post vital signs: stable    Level of consciousness: awake    Nausea/Vomiting: no nausea/vomiting    Complications: none    Transfer of care protocol was followed      Last vitals: Visit Vitals  BP (!) 132/57   Pulse 78   Temp 36.6 °C (97.9 °F)   Resp 17   Ht 5' 9" (1.753 m)   Wt 106.8 kg (235 lb 7.2 oz)   SpO2 (!) 92%   BMI 34.77 kg/m²     "

## 2024-02-29 NOTE — ANESTHESIA PROCEDURE NOTES
Intubation    Date/Time: 2/29/2024 1:08 PM    Performed by: Jadiel Lucio CRNA  Authorized by: Enoch Sharma MD    Intubation:     Induction:  Intravenous    Intubated:  Postinduction    Mask Ventilation:  Easy mask    Attempts:  1    Attempted By:  CRNA    Method of Intubation:  Direct    Blade:  Marie 2    Laryngeal View Grade: Grade I - full view of cords      Difficult Airway Encountered?: No      Complications:  None    Airway Device Size:  7.5    Style/Cuff Inflation:  Cuffed    Tube secured:  21    Placement Verified By:  Capnometry    Complicating Factors:  None    Findings Post-Intubation:  BS equal bilateral  Notes:      Portillo 4 used for intubation first attempt.  Mod difficult visualization with Marie 2 prior to Portillo

## 2024-02-29 NOTE — PLAN OF CARE
Orthopedics contacted ER and states there is nothing surgical about fractures and recommended outpatient therapy. She was referred back to nursing facility. Transferred via ambulance. Pt is aaox4-pain improving after iv meds and able to rest-delicia score 9/10,will continue o2 til ready to wean in phase2-he meets criteria for phase2 care per dr guzman-to rm 16 via bed

## 2024-03-01 PROCEDURE — 90962 ESRD SERV 1 VISIT P MO 20+: CPT | Mod: ,,,

## 2024-03-01 NOTE — ANESTHESIA POSTPROCEDURE EVALUATION
Anesthesia Post Evaluation    Patient: Nicholas Vera    Procedure(s) Performed: Procedure(s) (LRB):  REPAIR, HERNIA, INGUINAL Right   open (Right)  INSERTION, PENILE PROSTHESIS (N/A)    Final Anesthesia Type: general      Patient location during evaluation: PACU  Patient participation: Yes- Able to Participate  Level of consciousness: oriented and awake  Post-procedure vital signs: reviewed and stable  Pain management: adequate  Airway patency: patent    PONV status at discharge: No PONV  Anesthetic complications: no      Cardiovascular status: hemodynamically stable  Respiratory status: spontaneous ventilation and unassisted  Hydration status: euvolemic  Follow-up not needed.  Comments: [facility]              Vitals Value Taken Time   /64 02/29/24 1730   Temp 36.9 °C (98.5 °F) 02/29/24 1730   Pulse 74 02/29/24 1730   Resp 18 02/29/24 1730   SpO2 96 % 02/29/24 1730         Event Time   Out of Recovery 16:23:00         Pain/Rich Score: Pain Rating Prior to Med Admin: 7 (2/29/2024  4:00 PM)  Pain Rating Post Med Admin: 5 (2/29/2024  4:20 PM)  Rich Score: 10 (2/29/2024  5:30 PM)

## 2024-03-02 VITALS
RESPIRATION RATE: 18 BRPM | BODY MASS INDEX: 34.87 KG/M2 | TEMPERATURE: 99 F | WEIGHT: 235.44 LBS | OXYGEN SATURATION: 96 % | HEART RATE: 74 BPM | HEIGHT: 69 IN | SYSTOLIC BLOOD PRESSURE: 125 MMHG | DIASTOLIC BLOOD PRESSURE: 64 MMHG

## 2024-03-04 NOTE — DISCHARGE SUMMARY
[facility] Surgical - Periop Services  Discharge Note  Short Stay    Procedure(s) (LRB):  REPAIR, HERNIA, INGUINAL Right   open (Right)  INSERTION, PENILE PROSTHESIS (N/A)      OUTCOME: Patient tolerated treatment/procedure well without complication and is now ready for discharge.    DISPOSITION: Home or Self Care    FINAL DIAGNOSIS:  Right inguinal hernia    FOLLOWUP: In clinic    DISCHARGE INSTRUCTIONS:  No discharge procedures on file.     TIME SPENT ON DISCHARGE:      minutes

## 2024-03-13 ENCOUNTER — OFFICE VISIT (OUTPATIENT)
Dept: SURGERY | Facility: CLINIC | Age: 59
End: 2024-03-13
Payer: COMMERCIAL

## 2024-03-13 VITALS
BODY MASS INDEX: 35.1 KG/M2 | HEART RATE: 69 BPM | TEMPERATURE: 99 F | HEIGHT: 69 IN | WEIGHT: 237 LBS | DIASTOLIC BLOOD PRESSURE: 65 MMHG | SYSTOLIC BLOOD PRESSURE: 152 MMHG

## 2024-03-13 DIAGNOSIS — Z98.890 POST-OPERATIVE STATE: Primary | ICD-10-CM

## 2024-03-13 PROCEDURE — 99024 POSTOP FOLLOW-UP VISIT: CPT | Mod: ,,, | Performed by: SURGERY

## 2024-03-13 NOTE — PROGRESS NOTES
Post Operative Progress Note  General Surgery    Patient Name: Nicholas Vera  YOB: 1965    Date: 03/13/2024                   SUBJECTIVE:     Chief Complaint/Reason for Admission:   Chief Complaint   Patient presents with    Post-op Evaluation     Open right inguinal hernia repair 2/29/24        History of Present Illness:  Mr. Nicholas Vera is a 58 y.o. male who is 2 weeks post op surgery.  The patient is currently without any complaints.    Review of Systems:  12 point ROS negative except as stated in HPI    PAST HISTORY:     Past Medical History:   Diagnosis Date    Anemia     Anxiety     CAD (coronary artery disease)     CKD (chronic kidney disease) stage 4, GFR 15-29 ml/min     DDD (degenerative disc disease), cervical and lumbar s/p surgery     GERD (gastroesophageal reflux disease)     HTN (hypertension)     Hyperlipidemia     Obesity, unspecified     SHAYLEE on CPAP     PSVT (paroxysmal supraventricular tachycardia)     TIA (transient ischemic attack)     2008, 2015    Type 2 diabetes mellitus      Past Surgical History:   Procedure Laterality Date    ANTERIOR CERVICAL DISCECTOMY W/ FUSION  11/26/2021    C5-6, C6-7 ACDF- Dr. Jett    AV FISTULA PLACEMENT      CARPAL TUNNEL RELEASE Bilateral     bilateral CTR & left UND- 2004 & 2020    COLONOSCOPY      CORONARY STENT PLACEMENT  2018    RCA    ELBOW SURGERY Bilateral     FUNCTIONAL ENDOSCOPIC SINUS SURGERY (FESS)      INGUINAL HERNIA REPAIR      INSERTION, STENT, ARTERY  11/2022    MICRODISCECTOMY OF SPINE  04/17/2015    L4-5. L5-S1 decompression; left L4-5 microdiscectomy- Dr. Amaro    MYRINGOTOMY W/ TUBES      NASAL SEPTOPLASTY      PENILE PROSTHESIS IMPLANT N/A 2/29/2024    Procedure: INSERTION, PENILE PROSTHESIS;  Surgeon: Sam Ibarra MD;  Location: [facility];  Service: Urology;  Laterality: N/A;    REPAIR, HERNIA, INGUINAL Right 2/29/2024    Procedure: REPAIR, HERNIA, INGUINAL Right   open;  Surgeon: Donovan Dougherty  "MD Saji;  Location: [facility] OR;  Service: General;  Laterality: Right;  open right inguinal hernia repair    SINUS ENDOSCOPY      SPINE SURGERY  2015    TONSILLECTOMY, ADENOIDECTOMY      VASECTOMY       Family History   Problem Relation Age of Onset    Multiple sclerosis Daughter      Social History     Socioeconomic History    Marital status:    Tobacco Use    Smoking status: Former     Current packs/day: 2.00     Average packs/day: 2.0 packs/day for 37.0 years (74.0 ttl pk-yrs)     Types: Cigarettes, Vaping with nicotine    Smokeless tobacco: Never    Tobacco comments:     Quit smoking cigarettes 5 years ago.  Vaped for 3 years.  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    BASAGLAR KWIKPEN U-100 INSULIN glargine 100 units/mL (3mL) SubQ pen Inject 60 Units into the skin every evening.    BD ULTRA-FINE MINI PEN NEEDLE 31 gauge x 3/16" Ndle     blood sugar diagnostic, drum (ACCU-CHEK COMPACT PLUS TEST) Strp Accu-Chek Compact Plus Test Strips    calcitRIOL (ROCALTROL) 0.25 MCG Cap Take 0.5 mcg by mouth once daily.    calcium acetate,phosphat bind, (PHOSLO) 667 mg capsule Take 1,334 mg by mouth 3 (three) times daily.    cholecalciferol, vitamin D3, 125 mcg (5,000 unit) Tab Take 5,000 Units by mouth once daily.    clopidogreL (PLAVIX) 75 mg tablet Take 75 mg by mouth once daily at 6am.    DEXCOM G6 SENSOR Savana SMARTSI Each Topical Every 10 Days    DEXCOM G6 TRANSMITTER Savana     dulaglutide (TRULICITY) 1.5 mg/0.5 mL pen injector Inject 1.5 mg into the skin.    EScitalopram oxalate (LEXAPRO) 10 MG tablet Take 10 mg by " mouth once daily at 6am.    fenofibrate (TRICOR) 145 MG tablet Take 145 mg by mouth once daily at 6am.    gentamicin (GARAMYCIN) 0.1 % cream Apply topically.    insulin aspart U-100 (NOVOLOG) 100 unit/mL (3 mL) InPn pen Inject into the skin. Sliding scale    isosorbide mononitrate (IMDUR) 30 MG 24 hr tablet Take 30 mg by mouth once daily.    labetaloL (NORMODYNE) 200 MG tablet Take 300 mg by mouth 2 (two) times a day.    levoFLOXacin (LEVAQUIN) 500 MG tablet Take 1 tablet (500 mg total) by mouth once daily.    levoFLOXacin (LEVAQUIN) 500 MG tablet Take 1 tablet (500 mg total) by mouth once daily.    magnesium oxide 500 mg Tab Take 1 tablet by mouth. 400 mg BID    multivit-mins/iron/folic/lycop (CENTRUM MEN ORAL) Take 1 tablet by mouth once daily at 6am.    NIFEdipine (ADALAT CC) 60 MG TbSR Take 60 mg by mouth.    nitroGLYCERIN (NITROSTAT) 0.4 MG SL tablet as needed.    NOVOLIN N FLEXPEN 100 unit/mL (3 mL) InPn Inject 20 Units into the skin before breakfast.    oxyCODONE-acetaminophen (PERCOCET)  mg per tablet Take 1 tablet by mouth every 6 (six) hours as needed for Pain.    oxyCODONE-acetaminophen (PERCOCET)  mg per tablet Take 1 tablet by mouth every 6 (six) hours as needed for Pain.    pantoprazole (PROTONIX) 40 MG tablet Take 40 mg by mouth once daily at 6am.    rosuvastatin (CRESTOR) 20 MG tablet Take 20 mg by mouth once daily.    sodium bicarbonate 650 MG tablet Take 2 tablets (1,300 mg total) by mouth 2 (two) times daily.    terazosin (HYTRIN) 2 MG capsule Take 2 mg by mouth 2 (two) times a day.    torsemide (DEMADEX) 20 MG Tab Take 40 mg by mouth once daily.    VASCEPA 1 gram Cap Take 2 tablets by mouth 2 (two) times a day.    XYOSTED 100 mg/0.5 mL AtIn Inject into the skin once a week. Q wednesday     Current Facility-Administered Medications on File Prior to Visit   Medication    enoxaparin injection 40 mg     Review of patient's allergies indicates:   Allergen Reactions    Ticagrelor Shortness  "Of Breath       OBJECTIVE:   Visit Vitals  BP (!) 152/65   Pulse 69   Temp 98.5 °F (36.9 °C)   Ht 5' 9" (1.753 m)   Wt 107.5 kg (237 lb)   BMI 35.00 kg/m²       Physical Exam:  General:  Well developed, well nourished, no acute distress  GI:  Abdomen soft, non-tender, non-distended, normoactive bowel sounds, no masses  Skin:  Incision Clean/Dry/Intact    VISIT DIAGNOSES:       ICD-10-CM ICD-9-CM   1. Post-operative state  Z98.890 V45.89       ASSESSMENT/PLAN:     58 y.o. male who is s/p Right Inguinal hernia  -  Pt doing well  -  Wounds healing well    RTC PRN      "

## 2024-03-19 ENCOUNTER — OUTSIDE PLACE OF SERVICE (OUTPATIENT)
Dept: NEPHROLOGY | Facility: CLINIC | Age: 59
End: 2024-03-19
Payer: COMMERCIAL

## 2024-03-28 ENCOUNTER — TELEPHONE (OUTPATIENT)
Dept: TRANSPLANT | Facility: CLINIC | Age: 59
End: 2024-03-28
Payer: COMMERCIAL

## 2024-03-28 NOTE — TELEPHONE ENCOUNTER
"----- Message from Maria Fernanda Buckner RN sent at 3/28/2024  1:50 PM CDT -----    ----- Message -----  From: Willi Yanez  Sent: 3/28/2024   1:47 PM CDT  To: [facility] Pre-Kidney Transplant Clinical    Consult/Advisory:      Name Of Caller: Self    Contact Preference?: [phone]     What is the nature of the call?: Following up w/ office about being re-submitted to the list. Stating he can now get off of his Plavix if need be      Additional Notes:  "Thank you for all that you do for our patients"            "

## 2024-04-16 ENCOUNTER — OUTSIDE PLACE OF SERVICE (OUTPATIENT)
Dept: NEPHROLOGY | Facility: CLINIC | Age: 59
End: 2024-04-16
Payer: COMMERCIAL

## 2024-04-17 ENCOUNTER — HOSPITAL ENCOUNTER (OUTPATIENT)
Facility: HOSPITAL | Age: 59
Discharge: HOME | End: 2024-04-17
Attending: INTERNAL MEDICINE | Admitting: INTERNAL MEDICINE
Payer: COMMERCIAL

## 2024-04-17 ENCOUNTER — ANESTHESIA EVENT (OUTPATIENT)
Dept: ENDOSCOPY | Facility: HOSPITAL | Age: 59
End: 2024-04-17
Payer: COMMERCIAL

## 2024-04-17 ENCOUNTER — ANESTHESIA (OUTPATIENT)
Dept: ENDOSCOPY | Facility: HOSPITAL | Age: 59
End: 2024-04-17
Payer: COMMERCIAL

## 2024-04-17 LAB — POCT GLUCOSE: 70 MG/DL (ref 70–110)

## 2024-04-17 PROCEDURE — 63600175 PHARM REV CODE 636 W HCPCS

## 2024-04-17 PROCEDURE — D9220A PRA ANESTHESIA: Mod: CRNA,,,

## 2024-04-17 PROCEDURE — 37000009 HC ANESTHESIA EA ADD 15 MINS: Performed by: INTERNAL MEDICINE

## 2024-04-17 PROCEDURE — 25000003 PHARM REV CODE 250: Performed by: ANESTHESIOLOGY

## 2024-04-17 PROCEDURE — 82962 GLUCOSE BLOOD TEST: CPT | Performed by: INTERNAL MEDICINE

## 2024-04-17 PROCEDURE — D9220A PRA ANESTHESIA: Mod: ANES,,, | Performed by: ANESTHESIOLOGY

## 2024-04-17 PROCEDURE — 25000003 PHARM REV CODE 250

## 2024-04-17 PROCEDURE — 37000008 HC ANESTHESIA 1ST 15 MINUTES: Performed by: INTERNAL MEDICINE

## 2024-04-17 PROCEDURE — G0105 COLORECTAL SCRN; HI RISK IND: HCPCS | Performed by: INTERNAL MEDICINE

## 2024-04-17 RX ORDER — PROPOFOL 10 MG/ML
VIAL (ML) INTRAVENOUS
Status: COMPLETED
Start: 2024-04-17 | End: 2024-04-17

## 2024-04-17 RX ORDER — LIDOCAINE HYDROCHLORIDE 10 MG/ML
1 INJECTION, SOLUTION EPIDURAL; INFILTRATION; INTRACAUDAL; PERINEURAL ONCE
Status: CANCELLED | OUTPATIENT
Start: 2024-04-17 | End: 2024-04-17

## 2024-04-17 RX ORDER — HYDROMORPHONE HYDROCHLORIDE 2 MG/ML
0.2 INJECTION, SOLUTION INTRAMUSCULAR; INTRAVENOUS; SUBCUTANEOUS EVERY 5 MIN PRN
Status: CANCELLED | OUTPATIENT
Start: 2024-04-17

## 2024-04-17 RX ORDER — SODIUM CHLORIDE, SODIUM GLUCONATE, SODIUM ACETATE, POTASSIUM CHLORIDE AND MAGNESIUM CHLORIDE 30; 37; 368; 526; 502 MG/100ML; MG/100ML; MG/100ML; MG/100ML; MG/100ML
INJECTION, SOLUTION INTRAVENOUS CONTINUOUS
Status: CANCELLED | OUTPATIENT
Start: 2024-04-17 | End: 2024-05-17

## 2024-04-17 RX ORDER — ACETAMINOPHEN 10 MG/ML
1000 INJECTION, SOLUTION INTRAVENOUS ONCE
Status: CANCELLED | OUTPATIENT
Start: 2024-04-17 | End: 2024-04-17

## 2024-04-17 RX ORDER — MIDAZOLAM HYDROCHLORIDE 2 MG/2ML
2 INJECTION, SOLUTION INTRAMUSCULAR; INTRAVENOUS ONCE AS NEEDED
Status: CANCELLED | OUTPATIENT
Start: 2024-04-17 | End: 2035-09-13

## 2024-04-17 RX ORDER — PHENYLEPHRINE HCL IN 0.9% NACL 1 MG/10 ML
SYRINGE (ML) INTRAVENOUS
Status: COMPLETED
Start: 2024-04-17 | End: 2024-04-17

## 2024-04-17 RX ORDER — LIDOCAINE HYDROCHLORIDE 20 MG/ML
INJECTION INTRAVENOUS
Status: DISCONTINUED | OUTPATIENT
Start: 2024-04-17 | End: 2024-04-17

## 2024-04-17 RX ORDER — SODIUM CHLORIDE 9 MG/ML
0-999 INJECTION, SOLUTION INTRAVENOUS ONCE
Status: COMPLETED | OUTPATIENT
Start: 2024-04-17 | End: 2024-04-17

## 2024-04-17 RX ORDER — LIDOCAINE HYDROCHLORIDE 20 MG/ML
INJECTION, SOLUTION EPIDURAL; INFILTRATION; INTRACAUDAL; PERINEURAL
Status: DISCONTINUED
Start: 2024-04-17 | End: 2024-04-17 | Stop reason: HOSPADM

## 2024-04-17 RX ORDER — ONDANSETRON HYDROCHLORIDE 2 MG/ML
4 INJECTION, SOLUTION INTRAVENOUS DAILY PRN
Status: CANCELLED | OUTPATIENT
Start: 2024-04-17

## 2024-04-17 RX ORDER — PROPOFOL 10 MG/ML
VIAL (ML) INTRAVENOUS
Status: DISCONTINUED | OUTPATIENT
Start: 2024-04-17 | End: 2024-04-17

## 2024-04-17 RX ORDER — PHENYLEPHRINE HCL IN 0.9% NACL 1 MG/10 ML
SYRINGE (ML) INTRAVENOUS
Status: DISCONTINUED | OUTPATIENT
Start: 2024-04-17 | End: 2024-04-17

## 2024-04-17 RX ORDER — DIPHENHYDRAMINE HYDROCHLORIDE 50 MG/ML
25 INJECTION INTRAMUSCULAR; INTRAVENOUS EVERY 6 HOURS PRN
Status: CANCELLED | OUTPATIENT
Start: 2024-04-17

## 2024-04-17 RX ADMIN — Medication 100 MCG: at 08:04

## 2024-04-17 RX ADMIN — SODIUM CHLORIDE 10 ML/HR: 9 INJECTION, SOLUTION INTRAVENOUS at 07:04

## 2024-04-17 RX ADMIN — SODIUM CHLORIDE, SODIUM GLUCONATE, SODIUM ACETATE, POTASSIUM CHLORIDE AND MAGNESIUM CHLORIDE: 526; 502; 368; 37; 30 INJECTION, SOLUTION INTRAVENOUS at 08:04

## 2024-04-17 RX ADMIN — Medication 200 MCG: at 08:04

## 2024-04-17 RX ADMIN — PROPOFOL 90 MG: 10 INJECTION, EMULSION INTRAVENOUS at 08:04

## 2024-04-17 RX ADMIN — LIDOCAINE HYDROCHLORIDE 100 MG: 20 INJECTION INTRAVENOUS at 08:04

## 2024-04-17 NOTE — DISCHARGE SUMMARY
[facility] Endoscopy  Discharge Note  Short Stay    Procedure(s) (LRB):  COLON (N/A)      OUTCOME: Patient tolerated treatment/procedure well without complication and is now ready for discharge.    DISPOSITION: Home or Self Care    FINAL DIAGNOSIS:  personal history of colon polyps and diverticulosis    FOLLOWUP:  repeat Colonoscopy in 3 years    DISCHARGE INSTRUCTIONS:  No discharge procedures on file.     TIME SPENT ON DISCHARGE: 15 minutes

## 2024-04-17 NOTE — H&P
Endoscopy History and Physical    PCP - Saqib Emery MD    Procedure - Colonoscopy   Sedation: MAC  ASA - per anesthesia  Mallampati - per anesthesia  History of Anesthesia problems - no  Family history Anesthesia problems -  no     HPI:  This is a 58 y.o. male here for evaluation of : personal history of colon polyps    Reflux - no  Dysphagia - no  Abdominal pain - no  Diarrhea - no    ROS:  Constitutional: No fevers, chills, No weight loss  ENT: No allergies  CV: No chest pain  Pulm: No cough, No shortness of breath  Ophtho: No vision changes  GI: see HPI  Medical History:  has a past medical history of Anemia, Anxiety, CAD (coronary artery disease), CKD (chronic kidney disease) stage 4, GFR 15-29 ml/min, DDD (degenerative disc disease), cervical and lumbar s/p surgery, ESRD (end stage renal disease), GERD (gastroesophageal reflux disease), HTN (hypertension), Hyperlipidemia, Obesity, unspecified, SHAYLEE on CPAP, PSVT (paroxysmal supraventricular tachycardia), TIA (transient ischemic attack), and Type 2 diabetes mellitus.    Surgical History:  has a past surgical history that includes Anterior cervical discectomy w/ fusion (11/26/2021); Coronary stent placement (2018); Microdiscectomy of spine (04/17/2015); Carpal tunnel release (Bilateral); TONSILLECTOMY, ADENOIDECTOMY; Sinus endoscopy; Nasal septoplasty; Elbow surgery (Bilateral); Colonoscopy; Myringotomy w/ tubes; Functional endoscopic sinus surgery (FESS); Inguinal hernia repair; Vasectomy; AV fistula placement; insertion, stent, artery (11/2022); Spine surgery (2015); repair, hernia, inguinal (Right, 2/29/2024); and Penile prosthesis implant (N/A, 2/29/2024).    Family History: family history includes Multiple sclerosis in his daughter.. Otherwise no colon cancer, inflammatory bowel disease, or GI malignancies.    Social History:  reports that he has quit smoking. His smoking use included cigarettes and vaping with nicotine. He has a 74 pack-year smoking  history. He has never used smokeless tobacco. He reports that he does not drink alcohol and does not use drugs.    Review of patient's allergies indicates:   Allergen Reactions    Ticagrelor Shortness Of Breath       Medications:   Medications Prior to Admission   Medication Sig Dispense Refill Last Dose    aspirin 81 MG Chew Take 1 tablet by mouth every morning.   Taking    calcitRIOL (ROCALTROL) 0.25 MCG Cap Take 0.5 mcg by mouth once daily.   Taking    calcium acetate,phosphat bind, (PHOSLO) 667 mg capsule Take 1,334 mg by mouth 3 (three) times daily.   Taking    cholecalciferol, vitamin D3, 125 mcg (5,000 unit) Tab Take 5,000 Units by mouth once daily.   Taking    clopidogreL (PLAVIX) 75 mg tablet Take 75 mg by mouth once daily at 6am.   Taking    dulaglutide (TRULICITY) 1.5 mg/0.5 mL pen injector Inject 1.5 mg into the skin.   Taking    EScitalopram oxalate (LEXAPRO) 10 MG tablet Take 10 mg by mouth once daily at 6am.   Taking    fenofibrate (TRICOR) 145 MG tablet Take 145 mg by mouth once daily at 6am.   Taking    gentamicin (GARAMYCIN) 0.1 % cream Apply topically.   Taking    insulin aspart U-100 (NOVOLOG) 100 unit/mL (3 mL) InPn pen Inject into the skin. Sliding scale   Taking    isosorbide mononitrate (IMDUR) 30 MG 24 hr tablet Take 30 mg by mouth once daily.   Taking    labetaloL (NORMODYNE) 200 MG tablet Take 300 mg by mouth 2 (two) times a day.   Taking    levoFLOXacin (LEVAQUIN) 500 MG tablet Take 1 tablet (500 mg total) by mouth once daily. 7 tablet 0 Taking    levoFLOXacin (LEVAQUIN) 500 MG tablet Take 1 tablet (500 mg total) by mouth once daily. 7 tablet 0 Taking    magnesium oxide 500 mg Tab Take 1 tablet by mouth. 400 mg BID   Taking    NIFEdipine (ADALAT CC) 60 MG TbSR Take 60 mg by mouth.   Taking    pantoprazole (PROTONIX) 40 MG tablet Take 40 mg by mouth once daily at 6am.   Taking    rosuvastatin (CRESTOR) 20 MG tablet Take 20 mg by mouth once daily.   Taking    terazosin (HYTRIN) 2 MG capsule  "Take 2 mg by mouth 2 (two) times a day.   Taking    torsemide (DEMADEX) 20 MG Tab Take 40 mg by mouth once daily.   Taking    VASCEPA 1 gram Cap Take 2 tablets by mouth 2 (two) times a day.   Taking    BASAGLAR KWIKPEN U-100 INSULIN glargine 100 units/mL (3mL) SubQ pen Inject 60 Units into the skin every evening. (Patient not taking: Reported on 2024)   Not Taking    BD ULTRA-FINE MINI PEN NEEDLE 31 gauge x 3/16" Ndle        blood sugar diagnostic, drum (ACCU-CHEK COMPACT PLUS TEST) Strp Accu-Chek Compact Plus Test Strips       DEXCOM G6 SENSOR Savana SMARTSI Each Topical Every 10 Days       DEXCOM G6 TRANSMITTER Savana        multivit-mins/iron/folic/lycop (CENTRUM MEN ORAL) Take 1 tablet by mouth once daily at 6am.       nitroGLYCERIN (NITROSTAT) 0.4 MG SL tablet as needed. (Patient not taking: Reported on 2024)   Not Taking    NOVOLIN N FLEXPEN 100 unit/mL (3 mL) InPn Inject 20 Units into the skin before breakfast.       oxyCODONE-acetaminophen (PERCOCET)  mg per tablet Take 1 tablet by mouth every 6 (six) hours as needed for Pain. (Patient not taking: Reported on 2024) 28 tablet 0 Not Taking    oxyCODONE-acetaminophen (PERCOCET)  mg per tablet Take 1 tablet by mouth every 6 (six) hours as needed for Pain. (Patient not taking: Reported on 2024) 28 tablet 0 Not Taking    sodium bicarbonate 650 MG tablet Take 2 tablets (1,300 mg total) by mouth 2 (two) times daily. 180 tablet 3     XYOSTED 100 mg/0.5 mL AtIn Inject into the skin once a week. Q wednesday (Patient not taking: Reported on 2024)   Not Taking       Objective Findings:    Vital Signs: Per nursing notes.    Physical Exam:  General Appearance: Well appearing in no acute distress  Head:   Normocephalic, without obvious abnormality  Eyes:    No scleral icterus  Airway: Open  Neck: No restriction in mobility  Lungs: CTA bilaterally in anterior and posterior fields, no wheezes, no crackles.  Heart:  Regular rate and rhythm, " S1, S2 normal, no murmurs heard  Abdomen: Soft, non tender, non distended      Labs:  Lab Results   Component Value Date    WBC 8.75 01/31/2024    HGB 11.8 (L) 01/31/2024    HCT 38 02/29/2024     01/31/2024    CHOL 169 11/14/2023    TRIG 580 (H) 11/14/2023    HDL 22 (L) 11/14/2023    ALT 35 01/31/2024    AST 43 (H) 01/31/2024     01/31/2024    K 5.6 (H) 01/31/2024     12/29/2022    CREATININE 6.62 (H) 01/31/2024    BUN 42.7 (H) 01/31/2024    CO2 24 01/31/2024    TSH 3.8388 08/11/2022    PSA 0.97 11/08/2022    INR 0.99 12/27/2022    HGBA1C 6.9 11/14/2023         I have explained the risks and benefits of endoscopy procedures to the patient including but not limited to bleeding, perforation, infection, and death.    Thank you so much for allowing me to participate in the care of Nicholas Rodriguez MD

## 2024-04-17 NOTE — ANESTHESIA PREPROCEDURE EVALUATION
04/17/2024 for 2/29/2024  Nicholas Vera is a 58 y.o., male with multiple medical problems including ESRD, CAD, hx CVA, and constipation and hx colon polyps for colonoscopy.  He recently underwent hernia repair under GETA and did well.      COLON;VA COLONOSCOPY,DIAGNOSTIC [[id number]]    Past Medical History:   Diagnosis Date    Anemia     Anxiety     CAD (coronary artery disease)     CKD (chronic kidney disease) stage 4, GFR 15-29 ml/min     DDD (degenerative disc disease), cervical and lumbar s/p surgery     ESRD (end stage renal disease)     GERD (gastroesophageal reflux disease)     HTN (hypertension)     Hyperlipidemia     Obesity, unspecified     SHAYLEE on CPAP     PSVT (paroxysmal supraventricular tachycardia)     TIA (transient ischemic attack)     2008, 2015    Type 2 diabetes mellitus    PMH includes CVA 2015 (multiple CVA scars by Scan), TIA since that time; CAD, Coronary Stent x 2; last Dialysis yesterday.  Ex smoker/vaper - quit some few yrs ago.    Past Surgical History:   Procedure Laterality Date    ANTERIOR CERVICAL DISCECTOMY W/ FUSION  11/26/2021    C5-6, C6-7 ACDF- Dr. Jett    AV FISTULA PLACEMENT      CARPAL TUNNEL RELEASE Bilateral     bilateral CTR & left UND- 2004 & 2020    COLONOSCOPY      CORONARY STENT PLACEMENT  2018    RCA    ELBOW SURGERY Bilateral     FUNCTIONAL ENDOSCOPIC SINUS SURGERY (FESS)      INGUINAL HERNIA REPAIR      INSERTION, STENT, ARTERY  11/2022    MICRODISCECTOMY OF SPINE  04/17/2015    L4-5. L5-S1 decompression; left L4-5 microdiscectomy- Dr. Amaro    MYRINGOTOMY W/ TUBES      NASAL SEPTOPLASTY      PENILE PROSTHESIS IMPLANT N/A 2/29/2024    Procedure: INSERTION, PENILE PROSTHESIS;  Surgeon: Sam Ibarra MD;  Location: [facility];  Service: Urology;  Laterality: N/A;    REPAIR, HERNIA, INGUINAL Right 2/29/2024    Procedure: REPAIR, HERNIA, INGUINAL Right   open;  Surgeon: Donovan Dougherty Jr., MD;   Location: [facility] OR;  Service: General;  Laterality: Right;  open right inguinal hernia repair    SINUS ENDOSCOPY      SPINE SURGERY  2015    TONSILLECTOMY, ADENOIDECTOMY      VASECTOMY           ECG 1/11/2024 SR LATE TRANSITION POSSIBLE AWMI scar (indeterminate age)  TTE 08/16/22   Echo    Interpretation Summary  · The estimated ejection fraction is 55-60%.  · Mild tricuspid regurgitation.  · There is no pulmonary hypertension.  · The estimated PA systolic pressure is 31 mmHg.  · Small pericardial effusion.     CARDIAC CATH 12/29/2022   Coronary angiogram findings:   1. Coronary dominance:  right      2. Left Main:  large, normal   3. Left Anterior Descending:  moderate sided, patient LAD stent. Moderate   diease in jailed D2 with good flow   4. Left Circumflex:  moderate sized, 30-40% disease in OM2 otherwise   nonobstructive   5. Right: Dominant.  Moderate sized. Non obstructive disease.     Hemodynamic Findings: LVEDP:  20         Pre-op Assessment    I have reviewed the Patient Summary Reports.    I have reviewed the NPO Status.   I have reviewed the Medications.     Review of Systems  Anesthesia Hx:  No problems with previous Anesthesia             Denies Family Hx of Anesthesia complications.    Denies Personal Hx of Anesthesia complications.                    Social:  Non-Smoker       Hematology/Oncology:       -- Anemia:                                  Cardiovascular:  Exercise tolerance: good   Hypertension   CAD   CABG/stent Dysrhythmias (PSVT)   Denies Angina.   Denies Orthopnea.  Denies PND.  hyperlipidemia  Denies JESSICA.    Functional Capacity good / => 4 METS                         Pulmonary:        Sleep Apnea, CPAP                Renal/:  Chronic Renal Disease, CKD                Hepatic/GI:     GERD             Musculoskeletal:  Arthritis               Neurological:  TIA,  Denies CVA.                                    Endocrine:  Diabetes, type 2           Psych:   anxiety                  Physical Exam  General: Well nourished, Alert and Oriented    Airway:  Mallampati: III   Mouth Opening: Normal  TM Distance: Normal  Tongue: Normal  Neck ROM: Normal ROM    Dental:  Intact    Chest/Lungs:  Clear to auscultation    Heart:  Rate: Normal  Rhythm: Regular Rhythm  No pretibial edema  No carotid bruits      Anesthesia Plan  Type of Anesthesia, risks & benefits discussed:    Anesthesia Type: Gen Natural Airway  Intra-op Monitoring Plan: Standard ASA Monitors  Induction:  IV  Informed Consent: Informed consent signed with the Patient and all parties understand the risks and agree with anesthesia plan.  All questions answered.   ASA Score: 4  Day of Surgery Review of History & Physical: H&P Update referred to the surgeon/provider.    Ready For Surgery From Anesthesia Perspective.     .

## 2024-04-17 NOTE — TRANSFER OF CARE
"Anesthesia Transfer of Care Note    Patient: Nicholas Vera    Procedure(s) Performed: Procedure(s) (LRB):  COLON (N/A)    Patient location: GI    Anesthesia Type: general    Transport from OR: Transported from OR on room air with adequate spontaneous ventilation    Post pain: adequate analgesia    Post assessment: no apparent anesthetic complications and tolerated procedure well    Post vital signs: stable    Level of consciousness: awake, alert and oriented    Nausea/Vomiting: no nausea/vomiting    Complications: none    Transfer of care protocol was followed      Last vitals: Visit Vitals  /73   Pulse 75   Temp 35.6 °C (96.1 °F)   Resp 18   Ht 5' 9" (1.753 m)   Wt 103 kg (227 lb)   SpO2 (!) 93%   BMI 33.52 kg/m²     "

## 2024-04-17 NOTE — PROVATION PATIENT INSTRUCTIONS
Discharge Summary/Instructions after an Endoscopic Procedure  Patient Name: Nicholas Vera  Patient MRN: [id number]  Patient YOB: 1965 Wednesday, April 17, 2024  Jim Rodriguez MD  Dear patient,  As a result of recent federal legislation (The Federal Cures Act), you may   receive lab or pathology results from your procedure in your [facility]   account before your physician is able to contact you. Your physician or   their representative will relay the results to you with their   recommendations at their soonest availability.  Thank you,  RESTRICTIONS:  During your procedure today, you received medications for sedation.  These   medications may affect your judgment, balance and coordination.  Therefore,   for 24 hours, you have the following restrictions:   - DO NOT drive a car, operate machinery, make legal/financial decisions,   sign important papers or drink alcohol.    ACTIVITY:  Today: no heavy lifting, straining or running due to procedural   sedation/anesthesia.  The following day: return to full activity including work.  DIET:  Eat and drink normally unless instructed otherwise.     TREATMENT FOR COMMON SIDE EFFECTS:  - Mild abdominal pain, nausea, belching, bloating or excessive gas:  rest,   eat lightly and use a heating pad.  - Sore Throat: treat with throat lozenges and/or gargle with warm salt   water.  - Because air was used during the procedure, expelling large amounts of air   from your rectum or belching is normal.  - If a bowel prep was taken, you may not have a bowel movement for 1-3 days.    This is normal.  SYMPTOMS TO WATCH FOR AND REPORT TO YOUR PHYSICIAN:  1. Abdominal pain or bloating, other than gas cramps.  2. Chest pain.  3. Back pain.  4. Signs of infection such as: chills or fever occurring within 24 hours   after the procedure.  5. Rectal bleeding, which would show as bright red, maroon, or black stools.   (A tablespoon of blood from the rectum is not serious, especially if    hemorrhoids are present.)  6. Vomiting.  7. Weakness or dizziness.  GO DIRECTLY TO THE NEAREST EMERGENCY ROOM IF YOU HAVE ANY OF THE FOLLOWING:      Difficulty breathing              Chills and/or fever over 101 F   Persistent vomiting and/or vomiting blood   Severe abdominal pain   Severe chest pain   Black, tarry stools   Bleeding- more than one tablespoon   Any other symptom or condition that you feel may need urgent attention  Your doctor recommends these additional instructions:  If any biopsies were taken, your doctors clinic will contact you in 1 to 2   weeks with any results.  - Discharge patient to home (with spouse).   - Repeat colonoscopy in 3 years for surveillance.   - The findings and recommendations were discussed with the patient.   - The findings and recommendations were discussed with the patient's   family.  For questions, problems or results please call your physician - Jim Rodriguez MD at Work:  [phone].  [facility] [place], EMERGENCY ROOM PHONE NUMBER: [phone]  IF A COMPLICATION OR EMERGENCY SITUATION ARISES AND YOU ARE UNABLE TO REACH   YOUR PHYSICIAN - GO DIRECTLY TO THE EMERGENCY ROOM.  MD Jim Ruiz MD  4/17/2024 8:42:53 AM  This report has been verified and signed electronically.  Dear patient,  As a result of recent federal legislation (The Federal Cures Act), you may   receive lab or pathology results from your procedure in your [facility]   account before your physician is able to contact you. Your physician or   their representative will relay the results to you with their   recommendations at their soonest availability.  Thank you,  PROVATION

## 2024-04-18 VITALS
WEIGHT: 227 LBS | BODY MASS INDEX: 33.62 KG/M2 | DIASTOLIC BLOOD PRESSURE: 52 MMHG | SYSTOLIC BLOOD PRESSURE: 109 MMHG | OXYGEN SATURATION: 93 % | RESPIRATION RATE: 18 BRPM | HEART RATE: 73 BPM | HEIGHT: 69 IN | TEMPERATURE: 96 F

## 2024-04-22 NOTE — ANESTHESIA POSTPROCEDURE EVALUATION
Anesthesia Post Evaluation    Patient: Nicholas Vera    Procedure(s) Performed: Procedure(s) (LRB):  COLON (N/A)    Final Anesthesia Type: general      Patient location during evaluation: PACU  Patient participation: Yes- Able to Participate  Level of consciousness: awake and alert  Post-procedure vital signs: reviewed and stable  Pain management: adequate  Airway patency: patent      Anesthetic complications: no      Cardiovascular status: blood pressure returned to baseline  Respiratory status: unassisted  Hydration status: euvolemic  Follow-up not needed.              Vitals Value Taken Time   /52 04/17/24 0856   Temp  04/22/24 0902   Pulse 73 04/17/24 0856   Resp 18 04/17/24 0856   SpO2 93 % 04/17/24 0856         No case tracking events are documented in the log.      Pain/Rich Score: No data recorded

## 2024-05-01 ENCOUNTER — TELEPHONE (OUTPATIENT)
Dept: TRANSPLANT | Facility: CLINIC | Age: 59
End: 2024-05-01
Payer: COMMERCIAL

## 2024-05-06 ENCOUNTER — DOCUMENTATION ONLY (OUTPATIENT)
Dept: TRANSPLANT | Facility: CLINIC | Age: 59
End: 2024-05-06
Payer: COMMERCIAL

## 2024-05-10 ENCOUNTER — TELEPHONE (OUTPATIENT)
Dept: TRANSPLANT | Facility: CLINIC | Age: 59
End: 2024-05-10
Payer: COMMERCIAL

## 2024-05-10 NOTE — TELEPHONE ENCOUNTER
Phoned patient and informed in he has a recent Echo, Stress Test and Cardiology Clearance submit to JONO at  and have them to do a re referral. Patient verbalized understanding. All questions answered to patient's satisfaction.    ----- Message from Cecelia Gibson RN sent at 5/9/2024  2:42 PM CDT -----  Regarding: FW: discuss denial letter  Contact: PT  [phone]    ----- Message -----  From: Betsy Natarajan  Sent: 5/9/2024   2:41 PM CDT  To: [facility]  Subject: discuss denial letter                            The patient called requesting to speak to Michelle Abadie or Odalis. PT recieved letter of denial would like to discuss the reason. PT states the reason stated has already been cleared through another test. Please call to advise/discuss denial letter     No further information provided      Patient can be contacted @# [phone]

## 2024-05-21 ENCOUNTER — OUTSIDE PLACE OF SERVICE (OUTPATIENT)
Dept: NEPHROLOGY | Facility: CLINIC | Age: 59
End: 2024-05-21
Payer: COMMERCIAL

## 2024-05-23 ENCOUNTER — LAB VISIT (OUTPATIENT)
Dept: LAB | Facility: HOSPITAL | Age: 59
End: 2024-05-23
Attending: INTERNAL MEDICINE
Payer: COMMERCIAL

## 2024-05-23 DIAGNOSIS — E78.2 MIXED HYPERLIPIDEMIA: Primary | ICD-10-CM

## 2024-05-23 LAB
ALBUMIN SERPL-MCNC: 3.3 G/DL (ref 3.5–5)
ALBUMIN/GLOB SERPL: 0.7 RATIO (ref 1.1–2)
ALP SERPL-CCNC: 58 UNIT/L (ref 40–150)
ALT SERPL-CCNC: 29 UNIT/L (ref 0–55)
ANION GAP SERPL CALC-SCNC: 12 MEQ/L
AST SERPL-CCNC: 32 UNIT/L (ref 5–34)
BILIRUB SERPL-MCNC: 0.5 MG/DL
BUN SERPL-MCNC: 46.1 MG/DL (ref 8.4–25.7)
CALCIUM SERPL-MCNC: 9.8 MG/DL (ref 8.4–10.2)
CHLORIDE SERPL-SCNC: 98 MMOL/L (ref 98–107)
CHOLEST SERPL-MCNC: 187 MG/DL
CHOLEST/HDLC SERPL: 6 {RATIO} (ref 0–5)
CO2 SERPL-SCNC: 29 MMOL/L (ref 22–29)
CREAT SERPL-MCNC: 7.74 MG/DL (ref 0.73–1.18)
CREAT/UREA NIT SERPL: 6
GFR SERPLBLD CREATININE-BSD FMLA CKD-EPI: 7 ML/MIN/1.73/M2
GLOBULIN SER-MCNC: 4.5 GM/DL (ref 2.4–3.5)
GLUCOSE SERPL-MCNC: 173 MG/DL (ref 74–100)
HDLC SERPL-MCNC: 29 MG/DL (ref 35–60)
POTASSIUM SERPL-SCNC: 5.5 MMOL/L (ref 3.5–5.1)
PROT SERPL-MCNC: 7.8 GM/DL (ref 6.4–8.3)
SODIUM SERPL-SCNC: 139 MMOL/L (ref 136–145)
TRIGL SERPL-MCNC: 422 MG/DL (ref 34–140)

## 2024-05-23 PROCEDURE — 80061 LIPID PANEL: CPT

## 2024-05-23 PROCEDURE — 36415 COLL VENOUS BLD VENIPUNCTURE: CPT

## 2024-05-23 PROCEDURE — 80053 COMPREHEN METABOLIC PANEL: CPT

## 2024-07-02 ENCOUNTER — TELEPHONE (OUTPATIENT)
Dept: NEPHROLOGY | Facility: CLINIC | Age: 59
End: 2024-07-02
Payer: COMMERCIAL

## 2024-07-23 ENCOUNTER — OUTSIDE PLACE OF SERVICE (OUTPATIENT)
Dept: NEPHROLOGY | Facility: CLINIC | Age: 59
End: 2024-07-23
Payer: COMMERCIAL

## 2024-08-13 ENCOUNTER — TELEPHONE (OUTPATIENT)
Dept: TRANSPLANT | Facility: CLINIC | Age: 59
End: 2024-08-13
Payer: COMMERCIAL

## 2024-08-13 ENCOUNTER — OUTSIDE PLACE OF SERVICE (OUTPATIENT)
Dept: NEPHROLOGY | Facility: CLINIC | Age: 59
End: 2024-08-13
Payer: COMMERCIAL

## 2024-09-04 ENCOUNTER — TELEPHONE (OUTPATIENT)
Dept: TRANSPLANT | Facility: CLINIC | Age: 59
End: 2024-09-04
Payer: COMMERCIAL

## 2024-09-23 ENCOUNTER — LAB VISIT (OUTPATIENT)
Dept: LAB | Facility: HOSPITAL | Age: 59
End: 2024-09-23
Attending: SPECIALIST
Payer: COMMERCIAL

## 2024-09-23 DIAGNOSIS — I70.212 ATHEROSCLEROSIS OF NATIVE ARTERY OF LEFT LOWER EXTREMITY WITH INTERMITTENT CLAUDICATION: Primary | ICD-10-CM

## 2024-09-23 DIAGNOSIS — Z01.810 PRE-OPERATIVE CARDIOVASCULAR EXAMINATION: ICD-10-CM

## 2024-09-23 LAB
ANION GAP SERPL CALC-SCNC: 15 MEQ/L
BASOPHILS # BLD AUTO: 0.07 X10(3)/MCL
BASOPHILS NFR BLD AUTO: 0.9 %
BUN SERPL-MCNC: 44 MG/DL (ref 8.4–25.7)
CALCIUM SERPL-MCNC: 8.4 MG/DL (ref 8.4–10.2)
CHLORIDE SERPL-SCNC: 96 MMOL/L (ref 98–107)
CO2 SERPL-SCNC: 27 MMOL/L (ref 22–29)
CREAT SERPL-MCNC: 8.2 MG/DL (ref 0.73–1.18)
CREAT/UREA NIT SERPL: 5
EOSINOPHIL # BLD AUTO: 0.3 X10(3)/MCL (ref 0–0.9)
EOSINOPHIL NFR BLD AUTO: 3.8 %
ERYTHROCYTE [DISTWIDTH] IN BLOOD BY AUTOMATED COUNT: 13 % (ref 11.5–17)
GFR SERPLBLD CREATININE-BSD FMLA CKD-EPI: 7 ML/MIN/1.73/M2
GLUCOSE SERPL-MCNC: 234 MG/DL (ref 74–100)
HCT VFR BLD AUTO: 33.7 % (ref 42–52)
HGB BLD-MCNC: 11.1 G/DL (ref 14–18)
IMM GRANULOCYTES # BLD AUTO: 0.07 X10(3)/MCL (ref 0–0.04)
IMM GRANULOCYTES NFR BLD AUTO: 0.9 %
LYMPHOCYTES # BLD AUTO: 1.18 X10(3)/MCL (ref 0.6–4.6)
LYMPHOCYTES NFR BLD AUTO: 14.9 %
MCH RBC QN AUTO: 29.4 PG (ref 27–31)
MCHC RBC AUTO-ENTMCNC: 32.9 G/DL (ref 33–36)
MCV RBC AUTO: 89.4 FL (ref 80–94)
MONOCYTES # BLD AUTO: 0.74 X10(3)/MCL (ref 0.1–1.3)
MONOCYTES NFR BLD AUTO: 9.4 %
NEUTROPHILS # BLD AUTO: 5.55 X10(3)/MCL (ref 2.1–9.2)
NEUTROPHILS NFR BLD AUTO: 70.1 %
PLATELET # BLD AUTO: 281 X10(3)/MCL (ref 130–400)
PMV BLD AUTO: 9.2 FL (ref 7.4–10.4)
POTASSIUM SERPL-SCNC: 4.2 MMOL/L (ref 3.5–5.1)
RBC # BLD AUTO: 3.77 X10(6)/MCL (ref 4.7–6.1)
SODIUM SERPL-SCNC: 138 MMOL/L (ref 136–145)
WBC # BLD AUTO: 7.91 X10(3)/MCL (ref 4.5–11.5)

## 2024-09-23 PROCEDURE — 85025 COMPLETE CBC W/AUTO DIFF WBC: CPT

## 2024-09-23 PROCEDURE — 36415 COLL VENOUS BLD VENIPUNCTURE: CPT

## 2024-09-23 PROCEDURE — 80048 BASIC METABOLIC PNL TOTAL CA: CPT

## 2024-09-24 ENCOUNTER — DOCUMENTATION ONLY (OUTPATIENT)
Dept: TRANSPLANT | Facility: CLINIC | Age: 59
End: 2024-09-24
Payer: COMMERCIAL

## 2024-09-24 ENCOUNTER — OUTSIDE PLACE OF SERVICE (OUTPATIENT)
Dept: NEPHROLOGY | Facility: CLINIC | Age: 59
End: 2024-09-24
Payer: COMMERCIAL

## 2024-09-26 ENCOUNTER — HOSPITAL ENCOUNTER (OUTPATIENT)
Dept: RADIOLOGY | Facility: HOSPITAL | Age: 59
Discharge: HOME | End: 2024-09-26
Attending: INTERNAL MEDICINE
Payer: COMMERCIAL

## 2024-09-26 DIAGNOSIS — Z87.891 FORMER SMOKER: ICD-10-CM

## 2024-09-26 PROCEDURE — 71271 CT THORAX LUNG CANCER SCR C-: CPT | Mod: TC

## 2024-10-15 ENCOUNTER — TELEPHONE (OUTPATIENT)
Dept: TRANSPLANT | Facility: CLINIC | Age: 59
End: 2024-10-15
Payer: COMMERCIAL

## 2024-10-29 ENCOUNTER — DOCUMENTATION ONLY (OUTPATIENT)
Dept: TRANSPLANT | Facility: CLINIC | Age: 59
End: 2024-10-29
Payer: COMMERCIAL

## 2024-10-29 ENCOUNTER — OUTSIDE PLACE OF SERVICE (OUTPATIENT)
Dept: NEPHROLOGY | Facility: CLINIC | Age: 59
End: 2024-10-29

## 2024-10-29 ENCOUNTER — OFFICE VISIT (OUTPATIENT)
Dept: NEPHROLOGY | Facility: CLINIC | Age: 59
End: 2024-10-29
Payer: COMMERCIAL

## 2024-10-29 VITALS
HEART RATE: 91 BPM | TEMPERATURE: 98 F | WEIGHT: 240.38 LBS | DIASTOLIC BLOOD PRESSURE: 60 MMHG | OXYGEN SATURATION: 95 % | HEIGHT: 69 IN | BODY MASS INDEX: 35.6 KG/M2 | SYSTOLIC BLOOD PRESSURE: 120 MMHG | RESPIRATION RATE: 20 BRPM

## 2024-10-29 DIAGNOSIS — N18.6 ESRD ON PERITONEAL DIALYSIS: Primary | ICD-10-CM

## 2024-10-29 DIAGNOSIS — Z99.2 ESRD ON PERITONEAL DIALYSIS: Primary | ICD-10-CM

## 2024-10-29 PROCEDURE — 99999 PR PBB SHADOW E&M-EST. PATIENT-LVL V: CPT | Mod: PBBFAC,,, | Performed by: INTERNAL MEDICINE

## 2024-10-29 PROCEDURE — 99499 UNLISTED E&M SERVICE: CPT | Mod: S$GLB,,, | Performed by: INTERNAL MEDICINE

## 2024-10-29 RX ORDER — CALCITRIOL 0.5 UG/1
0.5 CAPSULE ORAL DAILY
COMMUNITY

## 2024-10-29 RX ORDER — POLYETHYLENE GLYCOL 3350 17 G/17G
POWDER, FOR SOLUTION ORAL
COMMUNITY
Start: 2024-09-02

## 2024-10-29 RX ORDER — LABETALOL 300 MG/1
300 TABLET, FILM COATED ORAL 2 TIMES DAILY
COMMUNITY

## 2024-10-29 RX ORDER — ERGOCALCIFEROL 1.25 MG/1
CAPSULE ORAL
COMMUNITY
Start: 2023-10-16

## 2024-10-29 RX ORDER — ISOSORBIDE DINITRATE 30 MG/1
TABLET ORAL 2 TIMES DAILY
COMMUNITY

## 2024-10-30 ENCOUNTER — CLINICAL SUPPORT (OUTPATIENT)
Dept: RESPIRATORY THERAPY | Facility: HOSPITAL | Age: 59
End: 2024-10-30
Attending: SPECIALIST
Payer: MEDICARE

## 2024-10-30 ENCOUNTER — LAB VISIT (OUTPATIENT)
Dept: LAB | Facility: HOSPITAL | Age: 59
End: 2024-10-30
Attending: SPECIALIST
Payer: COMMERCIAL

## 2024-10-30 DIAGNOSIS — I70.212 ATHEROSCLEROSIS OF NATIVE ARTERY OF LEFT LOWER EXTREMITY WITH INTERMITTENT CLAUDICATION: Primary | ICD-10-CM

## 2024-10-30 DIAGNOSIS — Z01.810 PRE-OPERATIVE CARDIOVASCULAR EXAMINATION: ICD-10-CM

## 2024-10-30 DIAGNOSIS — Z01.810 PRE-OPERATIVE CARDIOVASCULAR EXAMINATION: Primary | ICD-10-CM

## 2024-10-30 LAB
ANION GAP SERPL CALC-SCNC: 15 MEQ/L
BASOPHILS # BLD AUTO: 0.08 X10(3)/MCL
BASOPHILS NFR BLD AUTO: 1 %
BUN SERPL-MCNC: 42 MG/DL (ref 8.4–25.7)
CALCIUM SERPL-MCNC: 9.2 MG/DL (ref 8.4–10.2)
CHLORIDE SERPL-SCNC: 98 MMOL/L (ref 98–107)
CO2 SERPL-SCNC: 27 MMOL/L (ref 22–29)
CREAT SERPL-MCNC: 7.75 MG/DL (ref 0.72–1.25)
CREAT/UREA NIT SERPL: 5
EOSINOPHIL # BLD AUTO: 0.26 X10(3)/MCL (ref 0–0.9)
EOSINOPHIL NFR BLD AUTO: 3.4 %
ERYTHROCYTE [DISTWIDTH] IN BLOOD BY AUTOMATED COUNT: 14 % (ref 11.5–17)
GFR SERPLBLD CREATININE-BSD FMLA CKD-EPI: 7 ML/MIN/1.73/M2
GLUCOSE SERPL-MCNC: 171 MG/DL (ref 74–100)
HCT VFR BLD AUTO: 32.7 % (ref 42–52)
HGB BLD-MCNC: 10.8 G/DL (ref 14–18)
IMM GRANULOCYTES # BLD AUTO: 0.09 X10(3)/MCL (ref 0–0.04)
IMM GRANULOCYTES NFR BLD AUTO: 1.2 %
LYMPHOCYTES # BLD AUTO: 1.35 X10(3)/MCL (ref 0.6–4.6)
LYMPHOCYTES NFR BLD AUTO: 17.5 %
MCH RBC QN AUTO: 29.8 PG (ref 27–31)
MCHC RBC AUTO-ENTMCNC: 33 G/DL (ref 33–36)
MCV RBC AUTO: 90.3 FL (ref 80–94)
MONOCYTES # BLD AUTO: 0.66 X10(3)/MCL (ref 0.1–1.3)
MONOCYTES NFR BLD AUTO: 8.5 %
NEUTROPHILS # BLD AUTO: 5.28 X10(3)/MCL (ref 2.1–9.2)
NEUTROPHILS NFR BLD AUTO: 68.4 %
PLATELET # BLD AUTO: 291 X10(3)/MCL (ref 130–400)
PMV BLD AUTO: 9.8 FL (ref 7.4–10.4)
POTASSIUM SERPL-SCNC: 4.6 MMOL/L (ref 3.5–5.1)
RBC # BLD AUTO: 3.62 X10(6)/MCL (ref 4.7–6.1)
SODIUM SERPL-SCNC: 140 MMOL/L (ref 136–145)
WBC # BLD AUTO: 7.72 X10(3)/MCL (ref 4.5–11.5)

## 2024-10-30 PROCEDURE — 36415 COLL VENOUS BLD VENIPUNCTURE: CPT

## 2024-10-30 PROCEDURE — 85025 COMPLETE CBC W/AUTO DIFF WBC: CPT

## 2024-10-30 PROCEDURE — 93010 ELECTROCARDIOGRAM REPORT: CPT | Mod: ,,, | Performed by: INTERNAL MEDICINE

## 2024-10-30 PROCEDURE — 93005 ELECTROCARDIOGRAM TRACING: CPT

## 2024-10-30 PROCEDURE — 80048 BASIC METABOLIC PNL TOTAL CA: CPT

## 2024-10-31 LAB
OHS QRS DURATION: 104 MS
OHS QTC CALCULATION: 472 MS

## 2024-11-14 ENCOUNTER — OUTSIDE PLACE OF SERVICE (OUTPATIENT)
Dept: NEPHROLOGY | Facility: CLINIC | Age: 59
End: 2024-11-14

## 2024-11-14 ENCOUNTER — OFFICE VISIT (OUTPATIENT)
Dept: NEPHROLOGY | Facility: CLINIC | Age: 59
End: 2024-11-14
Payer: COMMERCIAL

## 2024-11-14 VITALS
WEIGHT: 244 LBS | RESPIRATION RATE: 20 BRPM | SYSTOLIC BLOOD PRESSURE: 96 MMHG | BODY MASS INDEX: 36.14 KG/M2 | DIASTOLIC BLOOD PRESSURE: 51 MMHG | HEIGHT: 69 IN | HEART RATE: 73 BPM | OXYGEN SATURATION: 98 %

## 2024-11-14 DIAGNOSIS — N18.6 ESRD (END STAGE RENAL DISEASE): Primary | ICD-10-CM

## 2024-11-14 PROCEDURE — 99999 PR PBB SHADOW E&M-EST. PATIENT-LVL V: CPT | Mod: PBBFAC,,, | Performed by: INTERNAL MEDICINE

## 2024-11-14 PROCEDURE — 99499 UNLISTED E&M SERVICE: CPT | Mod: S$GLB,,, | Performed by: INTERNAL MEDICINE

## 2024-12-11 ENCOUNTER — TELEPHONE (OUTPATIENT)
Dept: TRANSPLANT | Facility: CLINIC | Age: 59
End: 2024-12-11
Payer: COMMERCIAL

## 2024-12-11 ENCOUNTER — CLINICAL SUPPORT (OUTPATIENT)
Dept: RESPIRATORY THERAPY | Facility: HOSPITAL | Age: 59
End: 2024-12-11
Attending: SPECIALIST
Payer: COMMERCIAL

## 2024-12-11 ENCOUNTER — HOSPITAL ENCOUNTER (OUTPATIENT)
Dept: RADIOLOGY | Facility: HOSPITAL | Age: 59
Discharge: HOME | End: 2024-12-11
Attending: SPECIALIST
Payer: MEDICARE

## 2024-12-11 DIAGNOSIS — I70.219 EXTREMITY ATHEROSCLEROSIS WITH INTERMITTENT CLAUDICATION: ICD-10-CM

## 2024-12-11 DIAGNOSIS — Z01.811 PRE-OP CHEST EXAM: ICD-10-CM

## 2024-12-11 DIAGNOSIS — I70.219 EXTREMITY ATHEROSCLEROSIS WITH INTERMITTENT CLAUDICATION: Primary | ICD-10-CM

## 2024-12-11 PROCEDURE — 93005 ELECTROCARDIOGRAM TRACING: CPT

## 2024-12-11 PROCEDURE — 93010 ELECTROCARDIOGRAM REPORT: CPT | Mod: ,,, | Performed by: INTERNAL MEDICINE

## 2024-12-11 PROCEDURE — 71046 X-RAY EXAM CHEST 2 VIEWS: CPT | Mod: TC

## 2024-12-13 LAB
OHS QRS DURATION: 106 MS
OHS QTC CALCULATION: 477 MS

## 2024-12-18 ENCOUNTER — DOCUMENTATION ONLY (OUTPATIENT)
Dept: TRANSPLANT | Facility: CLINIC | Age: 59
End: 2024-12-18
Payer: COMMERCIAL

## 2025-01-14 ENCOUNTER — OUTSIDE PLACE OF SERVICE (OUTPATIENT)
Dept: NEPHROLOGY | Facility: CLINIC | Age: 60
End: 2025-01-14

## 2025-01-14 ENCOUNTER — OFFICE VISIT (OUTPATIENT)
Dept: NEPHROLOGY | Facility: CLINIC | Age: 60
End: 2025-01-14
Payer: MEDICARE

## 2025-01-14 VITALS
BODY MASS INDEX: 35.87 KG/M2 | HEIGHT: 69 IN | HEART RATE: 77 BPM | RESPIRATION RATE: 20 BRPM | SYSTOLIC BLOOD PRESSURE: 120 MMHG | WEIGHT: 242.19 LBS | TEMPERATURE: 98 F | DIASTOLIC BLOOD PRESSURE: 60 MMHG | OXYGEN SATURATION: 97 %

## 2025-01-14 DIAGNOSIS — N18.6 ESRD ON PERITONEAL DIALYSIS: Primary | ICD-10-CM

## 2025-01-14 DIAGNOSIS — Z99.2 ESRD ON PERITONEAL DIALYSIS: Primary | ICD-10-CM

## 2025-01-14 DIAGNOSIS — I10 HYPERTENSION, UNSPECIFIED TYPE: Primary | ICD-10-CM

## 2025-01-14 PROCEDURE — 99215 OFFICE O/P EST HI 40 MIN: CPT | Mod: PBBFAC | Performed by: INTERNAL MEDICINE

## 2025-01-14 PROCEDURE — 99999 PR PBB SHADOW E&M-EST. PATIENT-LVL V: CPT | Mod: PBBFAC,,, | Performed by: INTERNAL MEDICINE

## 2025-01-14 PROCEDURE — 99499 UNLISTED E&M SERVICE: CPT | Mod: S$PBB,,, | Performed by: INTERNAL MEDICINE

## 2025-01-14 RX ORDER — ASPIRIN 325 MG
50000 TABLET, DELAYED RELEASE (ENTERIC COATED) ORAL
COMMUNITY
Start: 2024-10-22

## 2025-01-14 RX ORDER — LABETALOL 300 MG/1
150 TABLET, FILM COATED ORAL 2 TIMES DAILY
Qty: 90 TABLET | Refills: 3 | Status: SHIPPED | OUTPATIENT
Start: 2025-01-14 | End: 2026-01-14

## 2025-01-14 RX ORDER — VIBEGRON 75 MG/1
1 TABLET, FILM COATED ORAL DAILY
COMMUNITY
Start: 2025-01-10

## 2025-02-11 ENCOUNTER — OUTSIDE PLACE OF SERVICE (OUTPATIENT)
Dept: NEPHROLOGY | Facility: CLINIC | Age: 60
End: 2025-02-11

## 2025-02-11 ENCOUNTER — OFFICE VISIT (OUTPATIENT)
Dept: NEPHROLOGY | Facility: CLINIC | Age: 60
End: 2025-02-11
Payer: MEDICARE

## 2025-02-11 VITALS
DIASTOLIC BLOOD PRESSURE: 67 MMHG | WEIGHT: 235 LBS | HEIGHT: 69 IN | SYSTOLIC BLOOD PRESSURE: 123 MMHG | RESPIRATION RATE: 18 BRPM | BODY MASS INDEX: 34.8 KG/M2 | TEMPERATURE: 98 F | HEART RATE: 74 BPM | OXYGEN SATURATION: 95 %

## 2025-02-11 DIAGNOSIS — Z99.2 ESRD ON PERITONEAL DIALYSIS: Primary | ICD-10-CM

## 2025-02-11 DIAGNOSIS — N18.6 ESRD ON PERITONEAL DIALYSIS: Primary | ICD-10-CM

## 2025-02-11 PROCEDURE — 99999 PR PBB SHADOW E&M-EST. PATIENT-LVL V: CPT | Mod: PBBFAC,,, | Performed by: INTERNAL MEDICINE

## 2025-02-11 PROCEDURE — 99215 OFFICE O/P EST HI 40 MIN: CPT | Mod: PBBFAC | Performed by: INTERNAL MEDICINE

## 2025-02-11 PROCEDURE — 99499 UNLISTED E&M SERVICE: CPT | Mod: S$PBB,,, | Performed by: INTERNAL MEDICINE

## 2025-02-11 RX ORDER — INSULIN GLARGINE-YFGN 100 [IU]/ML
INJECTION, SOLUTION SUBCUTANEOUS
COMMUNITY
Start: 2025-02-05

## 2025-03-06 ENCOUNTER — HOSPITAL ENCOUNTER (OUTPATIENT)
Dept: RADIOLOGY | Facility: HOSPITAL | Age: 60
Discharge: HOME | End: 2025-03-06
Attending: INTERNAL MEDICINE
Payer: MEDICARE

## 2025-03-06 DIAGNOSIS — R07.81 RIB PAIN ON LEFT SIDE: ICD-10-CM

## 2025-03-06 PROCEDURE — 71100 X-RAY EXAM RIBS UNI 2 VIEWS: CPT | Mod: TC,LT

## 2025-03-12 ENCOUNTER — OFFICE VISIT (OUTPATIENT)
Dept: NEPHROLOGY | Facility: CLINIC | Age: 60
End: 2025-03-12
Payer: MEDICARE

## 2025-03-12 ENCOUNTER — OUTSIDE PLACE OF SERVICE (OUTPATIENT)
Dept: NEPHROLOGY | Facility: CLINIC | Age: 60
End: 2025-03-12

## 2025-03-12 VITALS
OXYGEN SATURATION: 95 % | HEIGHT: 69 IN | HEART RATE: 75 BPM | SYSTOLIC BLOOD PRESSURE: 148 MMHG | DIASTOLIC BLOOD PRESSURE: 76 MMHG | RESPIRATION RATE: 18 BRPM | BODY MASS INDEX: 34.51 KG/M2 | WEIGHT: 233 LBS

## 2025-03-12 DIAGNOSIS — N18.6 ESRD ON PERITONEAL DIALYSIS: Primary | ICD-10-CM

## 2025-03-12 DIAGNOSIS — Z99.2 ESRD ON PERITONEAL DIALYSIS: Primary | ICD-10-CM

## 2025-03-12 PROCEDURE — 99499 UNLISTED E&M SERVICE: CPT | Mod: S$PBB,,, | Performed by: INTERNAL MEDICINE

## 2025-03-12 PROCEDURE — 99999 PR PBB SHADOW E&M-EST. PATIENT-LVL V: CPT | Mod: PBBFAC,,, | Performed by: INTERNAL MEDICINE

## 2025-03-12 PROCEDURE — 99215 OFFICE O/P EST HI 40 MIN: CPT | Mod: PBBFAC | Performed by: INTERNAL MEDICINE

## 2025-03-12 RX ORDER — TIRZEPATIDE 10 MG/.5ML
INJECTION, SOLUTION SUBCUTANEOUS
COMMUNITY
Start: 2025-03-11

## 2025-05-01 ENCOUNTER — OFFICE VISIT (OUTPATIENT)
Dept: NEPHROLOGY | Facility: CLINIC | Age: 60
End: 2025-05-01
Payer: MEDICARE

## 2025-05-01 ENCOUNTER — OUTSIDE PLACE OF SERVICE (OUTPATIENT)
Dept: NEPHROLOGY | Facility: CLINIC | Age: 60
End: 2025-05-01

## 2025-05-01 VITALS
TEMPERATURE: 98 F | BODY MASS INDEX: 33.89 KG/M2 | SYSTOLIC BLOOD PRESSURE: 109 MMHG | OXYGEN SATURATION: 97 % | DIASTOLIC BLOOD PRESSURE: 62 MMHG | RESPIRATION RATE: 20 BRPM | HEART RATE: 76 BPM | HEIGHT: 69 IN | WEIGHT: 228.81 LBS

## 2025-05-01 DIAGNOSIS — N18.6 ESRD (END STAGE RENAL DISEASE): Primary | ICD-10-CM

## 2025-05-01 PROCEDURE — 99215 OFFICE O/P EST HI 40 MIN: CPT | Mod: PBBFAC | Performed by: INTERNAL MEDICINE

## 2025-05-01 PROCEDURE — 99999 PR PBB SHADOW E&M-EST. PATIENT-LVL V: CPT | Mod: PBBFAC,,, | Performed by: INTERNAL MEDICINE

## 2025-05-01 RX ORDER — NIFEDIPINE 60 MG/1
60 TABLET, EXTENDED RELEASE ORAL DAILY
COMMUNITY
Start: 2025-02-25

## 2025-05-01 NOTE — PROGRESS NOTES
Pt on peritoneal dialysis   Monthly visit  Full note in [facility] documentaion

## 2025-05-22 ENCOUNTER — LAB VISIT (OUTPATIENT)
Dept: LAB | Facility: HOSPITAL | Age: 60
End: 2025-05-22
Attending: INTERNAL MEDICINE
Payer: MEDICARE

## 2025-05-22 DIAGNOSIS — E11.59 OBESITY, DIABETES, AND HYPERTENSION SYNDROME: ICD-10-CM

## 2025-05-22 DIAGNOSIS — E11.69 OBESITY, DIABETES, AND HYPERTENSION SYNDROME: ICD-10-CM

## 2025-05-22 DIAGNOSIS — I15.2 OBESITY, DIABETES, AND HYPERTENSION SYNDROME: ICD-10-CM

## 2025-05-22 DIAGNOSIS — E66.9 OBESITY, DIABETES, AND HYPERTENSION SYNDROME: ICD-10-CM

## 2025-05-22 DIAGNOSIS — E78.2 MIXED HYPERLIPIDEMIA: Primary | ICD-10-CM

## 2025-05-22 LAB
ALBUMIN SERPL-MCNC: 2.9 G/DL (ref 3.5–5)
ALBUMIN/GLOB SERPL: 0.6 RATIO (ref 1.1–2)
ALP SERPL-CCNC: 58 UNIT/L (ref 40–150)
ALT SERPL-CCNC: 22 UNIT/L (ref 0–55)
ANION GAP SERPL CALC-SCNC: 9 MEQ/L
AST SERPL-CCNC: 33 UNIT/L (ref 11–45)
BILIRUB SERPL-MCNC: 0.4 MG/DL
BUN SERPL-MCNC: 46 MG/DL (ref 8.4–25.7)
CALCIUM SERPL-MCNC: 9.4 MG/DL (ref 8.4–10.2)
CHLORIDE SERPL-SCNC: 100 MMOL/L (ref 98–107)
CHOLEST SERPL-MCNC: 172 MG/DL
CHOLEST/HDLC SERPL: 7 {RATIO} (ref 0–5)
CO2 SERPL-SCNC: 30 MMOL/L (ref 22–29)
CREAT SERPL-MCNC: 7.14 MG/DL (ref 0.72–1.25)
CREAT/UREA NIT SERPL: 6
EST. AVERAGE GLUCOSE BLD GHB EST-MCNC: 116.9 MG/DL
GFR SERPLBLD CREATININE-BSD FMLA CKD-EPI: 8 ML/MIN/1.73/M2
GLOBULIN SER-MCNC: 4.6 GM/DL (ref 2.4–3.5)
GLUCOSE SERPL-MCNC: 107 MG/DL (ref 74–100)
HBA1C MFR BLD: 5.7 %
HDLC SERPL-MCNC: 24 MG/DL (ref 35–60)
LDLC SERPL CALC-MCNC: 72 MG/DL (ref 50–140)
POTASSIUM SERPL-SCNC: 4.9 MMOL/L (ref 3.5–5.1)
PROT SERPL-MCNC: 7.5 GM/DL (ref 6.4–8.3)
SODIUM SERPL-SCNC: 139 MMOL/L (ref 136–145)
TRIGL SERPL-MCNC: 381 MG/DL (ref 34–140)
VLDLC SERPL CALC-MCNC: 76 MG/DL

## 2025-05-22 PROCEDURE — 80061 LIPID PANEL: CPT

## 2025-05-22 PROCEDURE — 36415 COLL VENOUS BLD VENIPUNCTURE: CPT

## 2025-05-22 PROCEDURE — 83036 HEMOGLOBIN GLYCOSYLATED A1C: CPT

## 2025-05-22 PROCEDURE — 80053 COMPREHEN METABOLIC PANEL: CPT

## 2025-06-17 ENCOUNTER — OUTSIDE PLACE OF SERVICE (OUTPATIENT)
Dept: NEPHROLOGY | Facility: CLINIC | Age: 60
End: 2025-06-17

## 2025-06-17 ENCOUNTER — OFFICE VISIT (OUTPATIENT)
Dept: NEPHROLOGY | Facility: CLINIC | Age: 60
End: 2025-06-17
Payer: MEDICARE

## 2025-06-17 VITALS
OXYGEN SATURATION: 97 % | BODY MASS INDEX: 34.51 KG/M2 | HEIGHT: 69 IN | DIASTOLIC BLOOD PRESSURE: 81 MMHG | SYSTOLIC BLOOD PRESSURE: 188 MMHG | TEMPERATURE: 98 F | HEART RATE: 80 BPM | RESPIRATION RATE: 18 BRPM | WEIGHT: 233 LBS

## 2025-06-17 DIAGNOSIS — N18.6 ESRD (END STAGE RENAL DISEASE): Primary | ICD-10-CM

## 2025-06-17 PROCEDURE — 99215 OFFICE O/P EST HI 40 MIN: CPT | Mod: PBBFAC | Performed by: INTERNAL MEDICINE

## 2025-06-17 PROCEDURE — 99999 PR PBB SHADOW E&M-EST. PATIENT-LVL V: CPT | Mod: PBBFAC,,, | Performed by: INTERNAL MEDICINE

## 2025-07-22 ENCOUNTER — OUTSIDE PLACE OF SERVICE (OUTPATIENT)
Dept: NEPHROLOGY | Facility: CLINIC | Age: 60
End: 2025-07-22

## 2025-07-22 ENCOUNTER — OFFICE VISIT (OUTPATIENT)
Dept: NEPHROLOGY | Facility: CLINIC | Age: 60
End: 2025-07-22
Payer: MEDICARE

## 2025-07-22 VITALS
TEMPERATURE: 99 F | HEART RATE: 85 BPM | BODY MASS INDEX: 34.36 KG/M2 | SYSTOLIC BLOOD PRESSURE: 158 MMHG | DIASTOLIC BLOOD PRESSURE: 83 MMHG | OXYGEN SATURATION: 98 % | RESPIRATION RATE: 18 BRPM | HEIGHT: 69 IN | WEIGHT: 232 LBS

## 2025-07-22 DIAGNOSIS — Z99.2 ESRD ON PERITONEAL DIALYSIS: ICD-10-CM

## 2025-07-22 DIAGNOSIS — N18.6 ESRD (END STAGE RENAL DISEASE): Primary | ICD-10-CM

## 2025-07-22 DIAGNOSIS — N18.6 ESRD ON PERITONEAL DIALYSIS: ICD-10-CM

## 2025-07-22 PROCEDURE — 99999 PR PBB SHADOW E&M-EST. PATIENT-LVL V: CPT | Mod: PBBFAC,,, | Performed by: INTERNAL MEDICINE

## 2025-07-22 PROCEDURE — 99215 OFFICE O/P EST HI 40 MIN: CPT | Mod: PBBFAC | Performed by: INTERNAL MEDICINE

## 2025-07-22 PROCEDURE — 99499 UNLISTED E&M SERVICE: CPT | Mod: S$PBB,,, | Performed by: INTERNAL MEDICINE

## 2025-07-22 RX ORDER — EZETIMIBE 10 MG/1
10 TABLET ORAL DAILY
COMMUNITY
Start: 2025-06-19

## 2025-07-22 NOTE — PROGRESS NOTES
Renal  Full note via [facility] documentation  Pt is a dialysis patient     Cellcept Pregnancy And Lactation Text: This medication is Pregnancy Category D and isn't considered safe during pregnancy. It is unknown if this medication is excreted in breast milk.

## 2025-08-19 ENCOUNTER — OFFICE VISIT (OUTPATIENT)
Dept: NEPHROLOGY | Facility: CLINIC | Age: 60
End: 2025-08-19
Payer: MEDICARE

## 2025-08-19 VITALS
DIASTOLIC BLOOD PRESSURE: 71 MMHG | HEIGHT: 69 IN | HEART RATE: 72 BPM | TEMPERATURE: 97 F | WEIGHT: 229 LBS | BODY MASS INDEX: 33.92 KG/M2 | RESPIRATION RATE: 18 BRPM | OXYGEN SATURATION: 96 % | SYSTOLIC BLOOD PRESSURE: 134 MMHG

## 2025-08-19 DIAGNOSIS — N18.6 ESRD (END STAGE RENAL DISEASE): Primary | ICD-10-CM

## 2025-08-19 DIAGNOSIS — Z99.2 ESRD ON PERITONEAL DIALYSIS: ICD-10-CM

## 2025-08-19 DIAGNOSIS — N18.6 ESRD ON PERITONEAL DIALYSIS: ICD-10-CM

## 2025-08-19 PROCEDURE — 99499 UNLISTED E&M SERVICE: CPT | Mod: S$PBB,,, | Performed by: INTERNAL MEDICINE

## 2025-08-19 PROCEDURE — 99215 OFFICE O/P EST HI 40 MIN: CPT | Mod: PBBFAC | Performed by: INTERNAL MEDICINE

## 2025-08-19 PROCEDURE — 99999 PR PBB SHADOW E&M-EST. PATIENT-LVL V: CPT | Mod: PBBFAC,,, | Performed by: INTERNAL MEDICINE

## 2025-08-19 RX ORDER — ASPIRIN 81 MG/1
81 TABLET ORAL ONCE
COMMUNITY
Start: 2025-08-19

## (undated) DEVICE — KIT CANIST SUCTION 1200CC

## (undated) DEVICE — Device

## (undated) DEVICE — RESERVOIR JACKSON-PRATT 100CC

## (undated) DEVICE — CLOSURE SKIN STERI STRIP 1/2X4

## (undated) DEVICE — KIT SURGICAL COLON .25 1.1OZ

## (undated) DEVICE — GLOVE SENSICARE PI GRN 6.5

## (undated) DEVICE — SYR 50ML CATH TIP

## (undated) DEVICE — SUT 2/0 30IN PROLENE MONO

## (undated) DEVICE — GLOVE SENSICARE PI SURG 6

## (undated) DEVICE — BLANKET SNUGGLE WARM UPPER BDY

## (undated) DEVICE — SYR 50CC LL

## (undated) DEVICE — SOL IRRI STRL WATER 1000ML

## (undated) DEVICE — ELECTRODE PATIENT RETURN DISP

## (undated) DEVICE — DRESSING TRANS 4X4 TEGADERM

## (undated) DEVICE — PACK SCROTO-PAK LONE STAR

## (undated) DEVICE — UNDERPAD PROTECT PLUS 17X24IN

## (undated) DEVICE — ADAPTER DUAL NSL LUER M-M 7FT

## (undated) DEVICE — BRIEF MESH LARGE

## (undated) DEVICE — SOL NACL IRR 1000ML BTL

## (undated) DEVICE — BANDAGE KERLIX AMD

## (undated) DEVICE — ADHESIVE DERMABOND ADVANCED

## (undated) DEVICE — SUPPORT ULNA NERVE PROTECTOR

## (undated) DEVICE — SUT 2/0 30IN SILK BLK BRAI

## (undated) DEVICE — DRAIN JP STD PENROSE 1/4X12IN

## (undated) DEVICE — NDL SYR 10ML 18X1.5 LL BLUNT

## (undated) DEVICE — TIP SUCTION YANKAUER

## (undated) DEVICE — SUT VICRYL PLUS 4-0 FS-2 27IN

## (undated) DEVICE — GLOVE SIGNATURE MICRO LTX 6.5

## (undated) DEVICE — DRAIN SURG HUBLESS 30CM 15FR

## (undated) DEVICE — KIT RETR PERI/GYN W/O STAYS

## (undated) DEVICE — SYR BULB IRRIG ST 60 LF

## (undated) DEVICE — BOWL UTILITY BLUE 32OZ

## (undated) DEVICE — GAUZE SPONGE 4X4 12PLY

## (undated) DEVICE — SYS IRRISEPT 450ML0.05% CHG

## (undated) DEVICE — COLLECTION SPECIMEN NEPTUNE

## (undated) DEVICE — DRAPE STERI U-SHAPED 47X51IN

## (undated) DEVICE — SUT VICRYL 3-0 27 SH

## (undated) DEVICE — SUT CTD VICRYL 2.0

## (undated) DEVICE — DRAPE UTILITY W/ TAPE 20X30IN

## (undated) DEVICE — ADHESIVE MASTISOL VIAL 48/BX

## (undated) DEVICE — BLOCK BLOX BITE DENT RIM 54FR

## (undated) DEVICE — SUT VICRYL CTD 2-0 GI 27 SH

## (undated) DEVICE — NDL 27G X 1 1/4

## (undated) DEVICE — GLOVE SIGNATURE MICRO LTX 7.5

## (undated) DEVICE — SPONGE LAP 18X18 PREWASHED

## (undated) DEVICE — SUT MCRYL PLUS 4-0 PS2 27IN

## (undated) DEVICE — GOWN X-LG STERILE BACK